# Patient Record
Sex: FEMALE | Race: WHITE | URBAN - METROPOLITAN AREA
[De-identification: names, ages, dates, MRNs, and addresses within clinical notes are randomized per-mention and may not be internally consistent; named-entity substitution may affect disease eponyms.]

---

## 2023-07-02 ENCOUNTER — APPOINTMENT (OUTPATIENT)
Dept: RADIOLOGY | Facility: MEDICAL CENTER | Age: 68
DRG: 065 | End: 2023-07-02
Attending: EMERGENCY MEDICINE
Payer: MEDICARE

## 2023-07-02 ENCOUNTER — APPOINTMENT (OUTPATIENT)
Dept: RADIOLOGY | Facility: MEDICAL CENTER | Age: 68
DRG: 065 | End: 2023-07-02
Attending: HOSPITALIST
Payer: MEDICARE

## 2023-07-02 ENCOUNTER — HOSPITAL ENCOUNTER (INPATIENT)
Facility: MEDICAL CENTER | Age: 68
LOS: 3 days | DRG: 065 | End: 2023-07-05
Attending: EMERGENCY MEDICINE | Admitting: HOSPITALIST
Payer: MEDICARE

## 2023-07-02 DIAGNOSIS — Z72.0 TOBACCO ABUSE: ICD-10-CM

## 2023-07-02 DIAGNOSIS — E11.9 TYPE 2 DIABETES MELLITUS WITHOUT COMPLICATION, WITHOUT LONG-TERM CURRENT USE OF INSULIN (HCC): ICD-10-CM

## 2023-07-02 DIAGNOSIS — I63.9 CEREBROVASCULAR ACCIDENT (CVA), UNSPECIFIED MECHANISM (HCC): ICD-10-CM

## 2023-07-02 DIAGNOSIS — G81.91 HEMIPARESIS OF RIGHT DOMINANT SIDE, UNSPECIFIED HEMIPARESIS ETIOLOGY (HCC): ICD-10-CM

## 2023-07-02 PROBLEM — R29.818 FOCAL NEUROLOGICAL DEFICIT: Status: ACTIVE | Noted: 2023-07-02

## 2023-07-02 LAB
ALBUMIN SERPL BCP-MCNC: 4.2 G/DL (ref 3.2–4.9)
ALBUMIN/GLOB SERPL: 1.4 G/DL
ALP SERPL-CCNC: 84 U/L (ref 30–99)
ALT SERPL-CCNC: 13 U/L (ref 2–50)
ANION GAP SERPL CALC-SCNC: 14 MMOL/L (ref 7–16)
APTT PPP: 30.5 SEC (ref 24.7–36)
AST SERPL-CCNC: 15 U/L (ref 12–45)
BASOPHILS # BLD AUTO: 0.6 % (ref 0–1.8)
BASOPHILS # BLD: 0.06 K/UL (ref 0–0.12)
BILIRUB SERPL-MCNC: 0.5 MG/DL (ref 0.1–1.5)
BUN SERPL-MCNC: 7 MG/DL (ref 8–22)
CALCIUM ALBUM COR SERPL-MCNC: 9.1 MG/DL (ref 8.5–10.5)
CALCIUM SERPL-MCNC: 9.3 MG/DL (ref 8.5–10.5)
CHLORIDE SERPL-SCNC: 101 MMOL/L (ref 96–112)
CO2 SERPL-SCNC: 22 MMOL/L (ref 20–33)
CREAT SERPL-MCNC: 0.42 MG/DL (ref 0.5–1.4)
EKG IMPRESSION: NORMAL
EOSINOPHIL # BLD AUTO: 0.29 K/UL (ref 0–0.51)
EOSINOPHIL NFR BLD: 3.1 % (ref 0–6.9)
ERYTHROCYTE [DISTWIDTH] IN BLOOD BY AUTOMATED COUNT: 39.2 FL (ref 35.9–50)
EST. AVERAGE GLUCOSE BLD GHB EST-MCNC: 315 MG/DL
GFR SERPLBLD CREATININE-BSD FMLA CKD-EPI: 106 ML/MIN/1.73 M 2
GLOBULIN SER CALC-MCNC: 2.9 G/DL (ref 1.9–3.5)
GLUCOSE BLD STRIP.AUTO-MCNC: 246 MG/DL (ref 65–99)
GLUCOSE SERPL-MCNC: 316 MG/DL (ref 65–99)
HBA1C MFR BLD: 12.6 % (ref 4–5.6)
HCT VFR BLD AUTO: 46.6 % (ref 37–47)
HGB BLD-MCNC: 16.2 G/DL (ref 12–16)
IMM GRANULOCYTES # BLD AUTO: 0.04 K/UL (ref 0–0.11)
IMM GRANULOCYTES NFR BLD AUTO: 0.4 % (ref 0–0.9)
INR PPP: 0.99 (ref 0.87–1.13)
LYMPHOCYTES # BLD AUTO: 2 K/UL (ref 1–4.8)
LYMPHOCYTES NFR BLD: 21.1 % (ref 22–41)
MAGNESIUM SERPL-MCNC: 1.7 MG/DL (ref 1.5–2.5)
MCH RBC QN AUTO: 30.4 PG (ref 27–33)
MCHC RBC AUTO-ENTMCNC: 34.8 G/DL (ref 32.2–35.5)
MCV RBC AUTO: 87.4 FL (ref 81.4–97.8)
MONOCYTES # BLD AUTO: 0.71 K/UL (ref 0–0.85)
MONOCYTES NFR BLD AUTO: 7.5 % (ref 0–13.4)
NEUTROPHILS # BLD AUTO: 6.37 K/UL (ref 1.82–7.42)
NEUTROPHILS NFR BLD: 67.3 % (ref 44–72)
NRBC # BLD AUTO: 0 K/UL
NRBC BLD-RTO: 0 /100 WBC (ref 0–0.2)
PLATELET # BLD AUTO: 170 K/UL (ref 164–446)
PMV BLD AUTO: 11.3 FL (ref 9–12.9)
POTASSIUM SERPL-SCNC: 4.1 MMOL/L (ref 3.6–5.5)
PROT SERPL-MCNC: 7.1 G/DL (ref 6–8.2)
PROTHROMBIN TIME: 13 SEC (ref 12–14.6)
RBC # BLD AUTO: 5.33 M/UL (ref 4.2–5.4)
SODIUM SERPL-SCNC: 137 MMOL/L (ref 135–145)
TROPONIN T SERPL-MCNC: 7 NG/L (ref 6–19)
TSH SERPL DL<=0.005 MIU/L-ACNC: 1.69 UIU/ML (ref 0.38–5.33)
WBC # BLD AUTO: 9.5 K/UL (ref 4.8–10.8)

## 2023-07-02 PROCEDURE — 70496 CT ANGIOGRAPHY HEAD: CPT

## 2023-07-02 PROCEDURE — 700105 HCHG RX REV CODE 258: Mod: JZ | Performed by: HOSPITALIST

## 2023-07-02 PROCEDURE — 700102 HCHG RX REV CODE 250 W/ 637 OVERRIDE(OP): Performed by: HOSPITALIST

## 2023-07-02 PROCEDURE — 700117 HCHG RX CONTRAST REV CODE 255: Performed by: HOSPITALIST

## 2023-07-02 PROCEDURE — 82962 GLUCOSE BLOOD TEST: CPT

## 2023-07-02 PROCEDURE — A9270 NON-COVERED ITEM OR SERVICE: HCPCS | Performed by: HOSPITALIST

## 2023-07-02 PROCEDURE — 84484 ASSAY OF TROPONIN QUANT: CPT

## 2023-07-02 PROCEDURE — 85025 COMPLETE CBC W/AUTO DIFF WBC: CPT

## 2023-07-02 PROCEDURE — 80053 COMPREHEN METABOLIC PANEL: CPT

## 2023-07-02 PROCEDURE — 770020 HCHG ROOM/CARE - TELE (206)

## 2023-07-02 PROCEDURE — 700111 HCHG RX REV CODE 636 W/ 250 OVERRIDE (IP): Mod: JZ | Performed by: HOSPITALIST

## 2023-07-02 PROCEDURE — 99223 1ST HOSP IP/OBS HIGH 75: CPT | Mod: 25,AI | Performed by: HOSPITALIST

## 2023-07-02 PROCEDURE — 99285 EMERGENCY DEPT VISIT HI MDM: CPT

## 2023-07-02 PROCEDURE — 70498 CT ANGIOGRAPHY NECK: CPT

## 2023-07-02 PROCEDURE — 36415 COLL VENOUS BLD VENIPUNCTURE: CPT

## 2023-07-02 PROCEDURE — 99406 BEHAV CHNG SMOKING 3-10 MIN: CPT | Performed by: HOSPITALIST

## 2023-07-02 PROCEDURE — 85610 PROTHROMBIN TIME: CPT

## 2023-07-02 PROCEDURE — 83036 HEMOGLOBIN GLYCOSYLATED A1C: CPT

## 2023-07-02 PROCEDURE — 83735 ASSAY OF MAGNESIUM: CPT

## 2023-07-02 PROCEDURE — 84443 ASSAY THYROID STIM HORMONE: CPT

## 2023-07-02 PROCEDURE — 85730 THROMBOPLASTIN TIME PARTIAL: CPT

## 2023-07-02 PROCEDURE — 71045 X-RAY EXAM CHEST 1 VIEW: CPT

## 2023-07-02 PROCEDURE — 94760 N-INVAS EAR/PLS OXIMETRY 1: CPT

## 2023-07-02 PROCEDURE — 99406 BEHAV CHNG SMOKING 3-10 MIN: CPT

## 2023-07-02 PROCEDURE — 70450 CT HEAD/BRAIN W/O DYE: CPT

## 2023-07-02 PROCEDURE — 93005 ELECTROCARDIOGRAM TRACING: CPT | Performed by: EMERGENCY MEDICINE

## 2023-07-02 RX ORDER — INSULIN LISPRO 100 [IU]/ML
2-9 INJECTION, SOLUTION INTRAVENOUS; SUBCUTANEOUS EVERY 6 HOURS
Status: DISCONTINUED | OUTPATIENT
Start: 2023-07-02 | End: 2023-07-03

## 2023-07-02 RX ORDER — ASPIRIN 300 MG/1
300 SUPPOSITORY RECTAL DAILY
Status: DISCONTINUED | OUTPATIENT
Start: 2023-07-02 | End: 2023-07-03

## 2023-07-02 RX ORDER — FEXOFENADINE HCL 180 MG/1
180 TABLET ORAL DAILY
COMMUNITY

## 2023-07-02 RX ORDER — AMOXICILLIN 250 MG
2 CAPSULE ORAL 2 TIMES DAILY
Status: DISCONTINUED | OUTPATIENT
Start: 2023-07-02 | End: 2023-07-05 | Stop reason: HOSPADM

## 2023-07-02 RX ORDER — ATORVASTATIN CALCIUM 40 MG/1
40 TABLET, FILM COATED ORAL EVERY EVENING
Status: DISCONTINUED | OUTPATIENT
Start: 2023-07-02 | End: 2023-07-05 | Stop reason: HOSPADM

## 2023-07-02 RX ORDER — NICOTINE 21 MG/24HR
21 PATCH, TRANSDERMAL 24 HOURS TRANSDERMAL
Status: DISCONTINUED | OUTPATIENT
Start: 2023-07-02 | End: 2023-07-05 | Stop reason: HOSPADM

## 2023-07-02 RX ORDER — ONDANSETRON 4 MG/1
4 TABLET, ORALLY DISINTEGRATING ORAL EVERY 4 HOURS PRN
Status: DISCONTINUED | OUTPATIENT
Start: 2023-07-02 | End: 2023-07-05 | Stop reason: HOSPADM

## 2023-07-02 RX ORDER — LORAZEPAM 2 MG/ML
1 INJECTION INTRAMUSCULAR EVERY 6 HOURS PRN
Status: DISCONTINUED | OUTPATIENT
Start: 2023-07-02 | End: 2023-07-05 | Stop reason: HOSPADM

## 2023-07-02 RX ORDER — ACETAMINOPHEN 325 MG/1
650 TABLET ORAL EVERY 6 HOURS PRN
Status: DISCONTINUED | OUTPATIENT
Start: 2023-07-02 | End: 2023-07-05 | Stop reason: HOSPADM

## 2023-07-02 RX ORDER — OXYCODONE HYDROCHLORIDE 5 MG/1
5 TABLET ORAL EVERY 4 HOURS PRN
Status: DISCONTINUED | OUTPATIENT
Start: 2023-07-02 | End: 2023-07-05 | Stop reason: HOSPADM

## 2023-07-02 RX ORDER — ONDANSETRON 2 MG/ML
4 INJECTION INTRAMUSCULAR; INTRAVENOUS EVERY 4 HOURS PRN
Status: DISCONTINUED | OUTPATIENT
Start: 2023-07-02 | End: 2023-07-05 | Stop reason: HOSPADM

## 2023-07-02 RX ORDER — ASPIRIN 81 MG/1
81 TABLET ORAL DAILY
Status: ON HOLD | COMMUNITY
End: 2023-07-05 | Stop reason: SDUPTHER

## 2023-07-02 RX ORDER — OXYCODONE HYDROCHLORIDE 10 MG/1
10 TABLET ORAL EVERY 4 HOURS PRN
Status: DISCONTINUED | OUTPATIENT
Start: 2023-07-02 | End: 2023-07-05 | Stop reason: HOSPADM

## 2023-07-02 RX ORDER — OXYCODONE HYDROCHLORIDE 5 MG/1
5-10 TABLET ORAL EVERY 4 HOURS PRN
Status: DISCONTINUED | OUTPATIENT
Start: 2023-07-02 | End: 2023-07-02

## 2023-07-02 RX ORDER — ENOXAPARIN SODIUM 100 MG/ML
40 INJECTION SUBCUTANEOUS DAILY
Status: DISCONTINUED | OUTPATIENT
Start: 2023-07-02 | End: 2023-07-05 | Stop reason: HOSPADM

## 2023-07-02 RX ORDER — SODIUM CHLORIDE, SODIUM LACTATE, POTASSIUM CHLORIDE, CALCIUM CHLORIDE 600; 310; 30; 20 MG/100ML; MG/100ML; MG/100ML; MG/100ML
INJECTION, SOLUTION INTRAVENOUS CONTINUOUS
Status: DISCONTINUED | OUTPATIENT
Start: 2023-07-02 | End: 2023-07-03

## 2023-07-02 RX ORDER — POLYETHYLENE GLYCOL 3350 17 G/17G
1 POWDER, FOR SOLUTION ORAL
Status: DISCONTINUED | OUTPATIENT
Start: 2023-07-02 | End: 2023-07-05 | Stop reason: HOSPADM

## 2023-07-02 RX ORDER — BISACODYL 10 MG
10 SUPPOSITORY, RECTAL RECTAL
Status: DISCONTINUED | OUTPATIENT
Start: 2023-07-02 | End: 2023-07-05 | Stop reason: HOSPADM

## 2023-07-02 RX ORDER — ASPIRIN 81 MG/1
81 TABLET, CHEWABLE ORAL DAILY
Status: DISCONTINUED | OUTPATIENT
Start: 2023-07-02 | End: 2023-07-05 | Stop reason: HOSPADM

## 2023-07-02 RX ADMIN — ACETAMINOPHEN 650 MG: 325 TABLET, FILM COATED ORAL at 13:26

## 2023-07-02 RX ADMIN — SODIUM CHLORIDE, POTASSIUM CHLORIDE, SODIUM LACTATE AND CALCIUM CHLORIDE: 600; 310; 30; 20 INJECTION, SOLUTION INTRAVENOUS at 13:23

## 2023-07-02 RX ADMIN — SENNOSIDES AND DOCUSATE SODIUM 2 TABLET: 50; 8.6 TABLET ORAL at 18:22

## 2023-07-02 RX ADMIN — ATORVASTATIN CALCIUM 40 MG: 40 TABLET, FILM COATED ORAL at 18:22

## 2023-07-02 RX ADMIN — OXYCODONE HYDROCHLORIDE 10 MG: 10 TABLET ORAL at 16:36

## 2023-07-02 RX ADMIN — ENOXAPARIN SODIUM 40 MG: 100 INJECTION SUBCUTANEOUS at 18:23

## 2023-07-02 RX ADMIN — IOHEXOL 80 ML: 350 INJECTION, SOLUTION INTRAVENOUS at 13:23

## 2023-07-02 RX ADMIN — ASPIRIN 81 MG 81 MG: 81 TABLET ORAL at 11:57

## 2023-07-02 RX ADMIN — INSULIN LISPRO 3 UNITS: 100 INJECTION, SOLUTION INTRAVENOUS; SUBCUTANEOUS at 16:37

## 2023-07-02 RX ADMIN — INSULIN GLARGINE-YFGN 15 UNITS: 100 INJECTION, SOLUTION SUBCUTANEOUS at 18:23

## 2023-07-02 ASSESSMENT — COPD QUESTIONNAIRES
COPD SCREENING SCORE: 5
HAVE YOU SMOKED AT LEAST 100 CIGARETTES IN YOUR ENTIRE LIFE: YES
DO YOU EVER COUGH UP ANY MUCUS OR PHLEGM?: YES, A FEW DAYS A WEEK OR MONTH
DURING THE PAST 4 WEEKS HOW MUCH DID YOU FEEL SHORT OF BREATH: NONE/LITTLE OF THE TIME

## 2023-07-02 ASSESSMENT — COGNITIVE AND FUNCTIONAL STATUS - GENERAL
MOBILITY SCORE: 16
WALKING IN HOSPITAL ROOM: A LOT
HELP NEEDED FOR BATHING: A LOT
CLIMB 3 TO 5 STEPS WITH RAILING: A LOT
PERSONAL GROOMING: A LOT
EATING MEALS: A LOT
MOVING FROM LYING ON BACK TO SITTING ON SIDE OF FLAT BED: A LITTLE
DRESSING REGULAR UPPER BODY CLOTHING: A LOT
DAILY ACTIVITIY SCORE: 12
DRESSING REGULAR LOWER BODY CLOTHING: A LOT
TOILETING: A LOT
STANDING UP FROM CHAIR USING ARMS: A LOT
SUGGESTED CMS G CODE MODIFIER DAILY ACTIVITY: CL
MOVING TO AND FROM BED TO CHAIR: A LITTLE
SUGGESTED CMS G CODE MODIFIER MOBILITY: CK

## 2023-07-02 ASSESSMENT — ENCOUNTER SYMPTOMS
DOUBLE VISION: 0
DIZZINESS: 0
VOMITING: 0
SORE THROAT: 0
COUGH: 0
FOCAL WEAKNESS: 1
ABDOMINAL PAIN: 0
SPEECH CHANGE: 1
HEADACHES: 1
BLURRED VISION: 0
SHORTNESS OF BREATH: 0
CHILLS: 0
FEVER: 0
PALPITATIONS: 0
DIARRHEA: 0
BACK PAIN: 0
LOSS OF CONSCIOUSNESS: 0
NAUSEA: 0

## 2023-07-02 NOTE — ED TRIAGE NOTES
Chief Complaint   Patient presents with    Slurred Speech     Since Friday @ 1000    Unilateral Weakness     Right sided weakness. Right arm strength 1/5 compared to left arm 5/5, also starting @ 1000 on friday       Pt BIBA from home with the above complaint. GCS 15, FSBG 253. Pt states she takes daily asa and is supposed to take metformin but has not in several weeks.     BP (!) 161/86   Pulse 94   Temp 36.3 °C (97.4 °F) (Temporal)   Resp 18   Ht 1.524 m (5')   Wt 74.8 kg (165 lb)   SpO2 89%   BMI 32.22 kg/m²

## 2023-07-02 NOTE — ED PROVIDER NOTES
ER Provider Note    Scribed for Narendra Corrigan M.D. by Shaila Fuentes. 7/2/2023  7:31 AM    Primary Care Provider: No primary care provider noted.    CHIEF COMPLAINT  Chief Complaint   Patient presents with    Slurred Speech     Since Friday @ 1000    Unilateral Weakness     Right sided weakness. Right arm strength 1/5 compared to left arm 5/5, also starting @ 1000 on friday       HPI/ROS  LIMITATION TO HISTORY   None  OUTSIDE HISTORIAN(S):  Sister is at bedside.     Nyasia Schuster is a 67 y.o. female who presents to the ED complaining of slurred speech onset 2 days ago. Patient states associated symptoms of unilateral weakness and numbness, that is worse in her right upper extremity that comes. She notes she takes baby aspirin. She denies a history of strokes. She admits to smoking 2 packs per day. Narendra Corrigan M.D. spent greater than 3 minutes with the patient explaining the importance of smoking cessation.     PAST MEDICAL HISTORY  History reviewed. No pertinent past medical history.    SURGICAL HISTORY  History reviewed. No pertinent surgical history.    FAMILY HISTORY  History reviewed. No pertinent family history.    SOCIAL HISTORY   reports that she has been smoking cigarettes. She has been smoking an average of 2 packs per day. She has never used smokeless tobacco. She reports that she does not currently use alcohol. She reports that she does not currently use drugs.    CURRENT MEDICATIONS  None noted.     ALLERGIES  Patient has no known allergies.    PHYSICAL EXAM  BP (!) 161/86   Pulse 94   Temp 36.3 °C (97.4 °F) (Temporal)   Resp 18   Ht 1.524 m (5')   Wt 74.8 kg (165 lb)   SpO2 89%   BMI 32.22 kg/m²     Constitutional: Well developed, Well nourished, No acute distress, Non-toxic appearance.   HENT: Normocephalic, Atraumatic, Bilateral external ears normal, Oropharynx is clear mucous membranes are moist. No oral exudates or nasal discharge.   Eyes: Pupils are equal round and reactive, EOMI,  Conjunctiva normal, No discharge.   Neck: Normal range of motion, No tenderness, Supple, No stridor. No meningismus.  Lymphatic: No lymphadenopathy noted.   Cardiovascular: Regular rate and rhythm without murmur rub or gallop.  Thorax & Lungs: Clear breath sounds bilaterally without wheezes, rhonchi or rales. There is no chest wall tenderness.   Abdomen: Soft non-tender non-distended. There is no rebound or guarding. No organomegaly is appreciated. Bowel sounds are normal.  Skin: Normal without rash.   Back: No CVA or spinal tenderness.   Extremities: Intact distal pulses, No edema, No tenderness, No cyanosis, No clubbing. Capillary refill is less than 2 seconds.  Musculoskeletal: Good range of motion in all major joints. No tenderness to palpation or major deformities noted.   Neurologic: Alert & oriented x 3, abnormal motor function, right upper extremity 3 out of 5 weakness, 5 out of 5 right lower extremity motor function. Reflexes are normal. Diminished sensation on right side of nose and cheek.  Gait not tested at this time  Psychiatric: Affect normal, Judgment normal, Mood normal. There is no suicidal ideation or patient reported hallucinations.       DIAGNOSTIC STUDIES    Labs:   Labs Reviewed   CBC WITH DIFFERENTIAL - Abnormal; Notable for the following components:       Result Value    Hemoglobin 16.2 (*)     Lymphocytes 21.10 (*)     All other components within normal limits    Narrative:     Indicate which anticoagulants the patient is on:->NONE  Biotin intake of greater than 5 mg per day may interfere with  troponin levels, causing false low values.   COMP METABOLIC PANEL - Abnormal; Notable for the following components:    Glucose 316 (*)     Bun 7 (*)     Creatinine 0.42 (*)     All other components within normal limits    Narrative:     Indicate which anticoagulants the patient is on:->NONE  Biotin intake of greater than 5 mg per day may interfere with  troponin levels, causing false low values.    APTT    Narrative:     Indicate which anticoagulants the patient is on:->NONE  Biotin intake of greater than 5 mg per day may interfere with  troponin levels, causing false low values.   PROTHROMBIN TIME    Narrative:     Indicate which anticoagulants the patient is on:->NONE  Biotin intake of greater than 5 mg per day may interfere with  troponin levels, causing false low values.   TROPONIN    Narrative:     Indicate which anticoagulants the patient is on:->NONE  Biotin intake of greater than 5 mg per day may interfere with  troponin levels, causing false low values.   CORRECTED CALCIUM    Narrative:     Indicate which anticoagulants the patient is on:->NONE  Biotin intake of greater than 5 mg per day may interfere with  troponin levels, causing false low values.   ESTIMATED GFR    Narrative:     Indicate which anticoagulants the patient is on:->NONE  Biotin intake of greater than 5 mg per day may interfere with  troponin levels, causing false low values.       EKG:   I have independently interpreted this EKG  Results for orders placed or performed during the hospital encounter of 23   EKG   Result Value Ref Range    Report       [facility] Emergency Dept.    Test Date:  2023  Pt Name:    LUIS AG          Department: ER  MRN:        [id number]                      Room:       [facility]  Gender:     Female                       Technician: [id number]  :        [id number]                   Requested By:ER TRIAGE PROTOCOL  Order #:    [id number]                    Reading MD: MURPHY BARFIELD MD    Measurements  Intervals                                Axis  Rate:       91                           P:          70  SD:         156                          QRS:        80  QRSD:       73                           T:          44  QT:         356  QTc:        439    Interpretive Statements  Sinus rhythm  Probable left atrial enlargement  Low voltage, extremity leads  Anteroseptal infarct,  old  No previous ECG available for comparison  Electronically Signed On 07- 07:38:03 PDT by MURPHY BARFIELD MD           Radiology:   The attending emergency physician has independently interpreted the diagnostic imaging associated with this visit and am waiting the final reading from the radiologist.   My preliminary interpretation is a follows: No intercranial bleed    Radiologist interpretation:   CT-HEAD W/O   Final Result      1.  Chronic microvascular ischemic type changes.   2.  No acute intracranial abnormality.         DX-CHEST-PORTABLE (1 VIEW)   Final Result         1.  No acute cardiopulmonary disease.   2.  Atherosclerosis           COURSE & MEDICAL DECISION MAKING     ED Observation Status? Yes; I am placing the patient in to an observation status due to a diagnostic uncertainty as well as therapeutic intensity. Patient placed in observation status at 7:37 AM, 7/2/2023.     Observation plan is as follows: Labs and imaging    Upon Reevaluation, the patient's condition has: not improved; and will be escalated to hospitalization.    Patient discharged from ED Observation status at 9:48 AM (Time) (7/2/2023) (Date).     INITIAL ASSESSMENT, COURSE AND PLAN  Care Narrative:   7:31 AM - Patient seen and examined at bedside. Discussed plan of care, including labs and imaging. Patient agrees to the plan of care. Ordered for labs, DX-Chest, and CT-Head w/o to evaluate her symptoms.      Laboratory evaluation reveals no leukocytosis, shift, anemia, electrolyte derangements or acidosis and glucose is elevated at 316.    CT scan of the head shows no evidence of intracranial bleed.  Chest x-ray is also unremarkable.    9:49 AM - I reevaluated the patient at bedside. The patient informs me they feel improved following administration. I discussed the patient's diagnostic study results. I informed the patient of my plan to admit today given the patient's current presentation and diagnostic study results. Patient  verbalizes understanding and support with my plan for admission.      Patient does not meet criteria for alteplase therapy.  This is a completed stroke.  She does not qualify for IR as well.  She will need to be admitted for further work-up including MRI of the brain, carotid duplex, possible echo    DISPOSITION AND DISCUSSIONS  I have discussed management of the patient with the following physicians and FELICIANO's:  9:49 AM - Spoke with Dr. Olivo (Hospitalist) who agrees to admit the patient.       Barriers to care at this time, including but not limited to: Patient does not have established PCP.     Decision tools and prescription drugs considered including, but not limited to: NIH Stroke Scale is 3 .    DISPOSITION:  Patient will be hospitalized by Dr. Olivo in guarded condition.    FINAL DIANGOSIS  1. Cerebrovascular accident (CVA), unspecified mechanism (HCC)    2. Tobacco abuse       Shaila GREEN ([place]), am scribing for, and in the presence of, Narendra Corrigan M.D..    Electronically signed by: Shaila Fuentes ([place]), 7/2/2023    Narendra GREEN M.D. personally performed the services described in this documentation, as scribed by Shaila Fuentes in my presence, and it is both accurate and complete.     The note accurately reflects work and decisions made by me.  Narendra Corrigan M.D.  7/2/2023  10:32 AM

## 2023-07-02 NOTE — ASSESSMENT & PLAN NOTE
Patient had been prescribed metformin several years ago but lost her insurance and has not been on any medication since.  continue sliding scale  A1c >12  Speech eval pending  Can likely resume metformin once passes swallow eval   Start more definitive management once we know what her A1c is and her p.o. intake has been resumed

## 2023-07-02 NOTE — ASSESSMENT & PLAN NOTE
CT scan is negative  Differential includes ischemic event versus mass lesion versus demyelinating process  Continue tele  Continue serial  neuro exams  Continue statin  Echo noted  Neurology consulted  -Aspirin for 10 days, stop date 7/13  -Plavix indefinitely  Mri done pending official read  PT/OT-postacute placement  PM&R-good candidate for IPR

## 2023-07-02 NOTE — PROGRESS NOTES
4 Eyes Skin Assessment Completed by SILVANA Sampson and SILVANA Hansen.    Head WDL  Ears WDL  Nose WDL  Mouth WDL  Neck WDL  Breast/Chest WDL  Shoulder Blades WDL  Spine WDL  (R) Arm/Elbow/Hand WDL  (L) Arm/Elbow/Hand WDL  Abdomen WDL  Groin WDL  Scrotum/Coccyx/Buttocks Redness and Blanching  (R) Leg WDL  (L) Leg WDL  (R) Heel/Foot/Toe WDL  (L) Heel/Foot/Toe WDL          Devices In Places Pulse Ox      Interventions In Place Gray Ear Foams    Possible Skin Injury No    Pictures Uploaded Into Epic N/A  Wound Consult Placed N/A  RN Wound Prevention Protocol Ordered No

## 2023-07-02 NOTE — ED NOTES
Med Rec complete per patient  No known allergies  Preferred Pharmacy: [facility]   Patient denies any prescription medications in the last 30 days

## 2023-07-02 NOTE — H&P
"Hospital Medicine History & Physical Note    Date of Service  7/2/2023    Primary Care Physician  No primary care provider on file.    Consultants      Specialist Names:     Code Status  No Order    Chief Complaint  Chief Complaint   Patient presents with    Slurred Speech     Since Friday @ 1000    Unilateral Weakness     Right sided weakness. Right arm strength 1/5 compared to left arm 5/5, also starting @ 1000 on friday       History of Presenting Illness  Nyasia Schuster is a 67 y.o. female who presented 7/2/2023 with dysarthria and right arm weakness.    Patient has a previous medical history of tobacco use and diabetes.  She moved up to the [facility] to help care for her mom several years ago, and lost her insurance and has been off of metformin ever since.  She is currently on no prescribed medications and has not seen a physician in several years due to her insurance situation.    She reports that 2 days ago, she noticed some difficulty with her speech.  It seemed like it was difficult for her to form words.  Around the same time she noticed that her right arm was weak.  Patient is right-hand-dominant.  Her sister who is at bedside and lives next to the patient noticed the speech difficulty 2 days ago, but did not notice any arm weakness until today.  She did not however see her sister yesterday.    Patient reports a little numbness on the right side of her face, and her right arm feels like it is asleep.  She is able to walk, but she states that she ends up walking in circles.  She is unable to tell me if that is because of weakness or dizziness.  This is however not normal for her.  Sister notes that 2 days ago it sounds like \"she was drunk\".  Patient was saying the right words, but they were just slurred.  Her speech is actually much better today.  Otherwise patient notes a headache, which she attributes to allergies.  She does get these headaches often and does not feel this headache is unusual.  She " has not had any chest pain or unusual back neck jaw or shoulder pain.  She has not had any palpitations.    I discussed the plan of care with patient and family.    Review of Systems  Review of Systems   Constitutional:  Negative for chills and fever.   HENT:  Negative for nosebleeds and sore throat.    Eyes:  Negative for blurred vision and double vision.   Respiratory:  Negative for cough and shortness of breath.    Cardiovascular:  Negative for chest pain, palpitations and leg swelling.   Gastrointestinal:  Negative for abdominal pain, diarrhea, nausea and vomiting.   Genitourinary:  Negative for dysuria and urgency.   Musculoskeletal:  Negative for back pain.   Skin:  Negative for rash.   Neurological:  Positive for speech change, focal weakness and headaches. Negative for dizziness and loss of consciousness.       Past Medical History   has no past medical history on file.    Surgical History   has no past surgical history on file.     Family History  family history is not on file.   Family history reviewed with patient. There is no family history that is pertinent to the chief complaint.     Social History   reports that she has been smoking cigarettes. She has been smoking an average of 2 packs per day. She has never used smokeless tobacco. She reports that she does not currently use alcohol. She reports that she does not currently use drugs.    Allergies  No Known Allergies    Medications  None       Physical Exam  Temp:  [36.3 °C (97.4 °F)] 36.3 °C (97.4 °F)  Pulse:  [91-98] 91  Resp:  [16-18] 17  BP: (161-175)/(79-88) 170/88  SpO2:  [89 %-93 %] 91 %  Blood Pressure : (!) 170/88   Temperature: 36.3 °C (97.4 °F)   Pulse: 91   Respiration: 17   Pulse Oximetry: 91 %       Physical Exam  Constitutional:       General: She is not in acute distress.     Appearance: Normal appearance. She is well-developed. She is not diaphoretic.   HENT:      Head: Normocephalic and atraumatic.   Neck:      Vascular: No JVD.    Cardiovascular:      Rate and Rhythm: Normal rate and regular rhythm.      Heart sounds: No murmur heard.  Pulmonary:      Effort: Pulmonary effort is normal. No respiratory distress.      Breath sounds: No stridor. No wheezing or rales.   Abdominal:      Palpations: Abdomen is soft.      Tenderness: There is no abdominal tenderness. There is no guarding or rebound.   Musculoskeletal:         General: No tenderness.      Right lower leg: No edema.      Left lower leg: No edema.   Skin:     General: Skin is warm and dry.      Findings: No rash.   Neurological:      Mental Status: She is alert and oriented to person, place, and time.      Comments: Patient is alert oriented, demonstrates no difficulties with receptive deficits.  Speech is slurred however word selection is appropriate.  External ocular muscles are intact pupils are equal and reactive  Patient smile slightly asymmetric however sister notes that that is normal for her.  Tongue is midline palate elevates symmetrically  Strength in the left arm leg and right leg are all 5/5  Right arm is 2/5: Patient is able to elevate her lower arm off the bed however cannot lift her arm off the bed in its entirety.  Gait was not tested   Psychiatric:         Mood and Affect: Mood normal.         Behavior: Behavior normal.         Thought Content: Thought content normal.         Laboratory:  Recent Labs     07/02/23  0730   WBC 9.5   RBC 5.33   HEMOGLOBIN 16.2*   HEMATOCRIT 46.6   MCV 87.4   MCH 30.4   MCHC 34.8   RDW 39.2   PLATELETCT 170   MPV 11.3     Recent Labs     07/02/23  0730   SODIUM 137   POTASSIUM 4.1   CHLORIDE 101   CO2 22   GLUCOSE 316*   BUN 7*   CREATININE 0.42*   CALCIUM 9.3     Recent Labs     07/02/23  0730   ALTSGPT 13   ASTSGOT 15   ALKPHOSPHAT 84   TBILIRUBIN 0.5   GLUCOSE 316*     Recent Labs     07/02/23  0730   APTT 30.5   INR 0.99     No results for input(s): NTPROBNP in the last 72 hours.      Recent Labs     07/02/23  0730   TROPONINT 7        Imaging:  CT-HEAD W/O   Final Result      1.  Chronic microvascular ischemic type changes.   2.  No acute intracranial abnormality.         DX-CHEST-PORTABLE (1 VIEW)   Final Result         1.  No acute cardiopulmonary disease.   2.  Atherosclerosis          X-Ray:  I have personally reviewed the images and compared with prior images. and My impression is: Head CT is reviewed and agree with radiologist that there are no acute findings.  Chest x-ray is likewise benign.  EKG:  I have personally reviewed the images and compared with prior images. and My impression is: EKG reviewed by myself demonstrates normal sinus rhythm with low voltage.  No evidence of acute ischemia or conduction delays.    Assessment/Plan:  Justification for Admission Status  I anticipate this patient will require at least two midnights for appropriate medical management, necessitating inpatient admission because focal neurologic deficits    Patient will need a Med/Surg bed on EMERGENCY service .  The need is secondary to focal neurologic deficit.    Hemiparesis of right dominant side (HCC)  Assessment & Plan  CT scan is negative  Differential includes ischemic event versus mass lesion versus demyelinating process  Admit to neurology  Serial neuro exams  Start aspirin  Start statin  PT OT and speech consultations  Keep n.p.o. pending speech eval  Consult neuro  Lifestyle modification with smoking cessation  Appropriate management of diabetes  Monitor blood pressures  Initial EKG and telemetry is sinus  Admit to monitored bed  Cardiac echo  Complete stroke imaging series with CTA of the head and neck as well as MRI as noted    Given that the time of onset is greater than 36 hours, and her NIHSS score is low, I do not think she is a candidate for thrombolytics or mechanical thrombectomy.    Type 2 diabetes mellitus without complication, without long-term current use of insulin (HCC)  Assessment & Plan  Patient had been prescribed metformin  several years ago but lost her insurance and has not been on any medication since.  Started on sliding scale  Check A1c  Currently n.p.o. pending speech eval  Start more definitive management once we know what her A1c is and her p.o. intake has been resumed    Tobacco abuse  Assessment & Plan  Patient counseled x5 minutes.  As needed replacement while in house.        VTE prophylaxis: enoxaparin ppx

## 2023-07-03 ENCOUNTER — APPOINTMENT (OUTPATIENT)
Dept: CARDIOLOGY | Facility: MEDICAL CENTER | Age: 68
DRG: 065 | End: 2023-07-03
Attending: HOSPITALIST
Payer: MEDICARE

## 2023-07-03 LAB
ANION GAP SERPL CALC-SCNC: 9 MMOL/L (ref 7–16)
BUN SERPL-MCNC: 5 MG/DL (ref 8–22)
CALCIUM SERPL-MCNC: 9.1 MG/DL (ref 8.5–10.5)
CHLORIDE SERPL-SCNC: 103 MMOL/L (ref 96–112)
CHOLEST SERPL-MCNC: 139 MG/DL (ref 100–199)
CO2 SERPL-SCNC: 27 MMOL/L (ref 20–33)
CREAT SERPL-MCNC: 0.46 MG/DL (ref 0.5–1.4)
GFR SERPLBLD CREATININE-BSD FMLA CKD-EPI: 104 ML/MIN/1.73 M 2
GLUCOSE BLD STRIP.AUTO-MCNC: 125 MG/DL (ref 65–99)
GLUCOSE BLD STRIP.AUTO-MCNC: 129 MG/DL (ref 65–99)
GLUCOSE BLD STRIP.AUTO-MCNC: 149 MG/DL (ref 65–99)
GLUCOSE BLD STRIP.AUTO-MCNC: 173 MG/DL (ref 65–99)
GLUCOSE BLD STRIP.AUTO-MCNC: 181 MG/DL (ref 65–99)
GLUCOSE SERPL-MCNC: 161 MG/DL (ref 65–99)
HDLC SERPL-MCNC: 32 MG/DL
LDLC SERPL CALC-MCNC: 90 MG/DL
LV EJECT FRACT  99904: 70
LV EJECT FRACT MOD 2C 99903: 62.28
LV EJECT FRACT MOD 4C 99902: 66.24
LV EJECT FRACT MOD BP 99901: 59.03
POTASSIUM SERPL-SCNC: 4 MMOL/L (ref 3.6–5.5)
SODIUM SERPL-SCNC: 139 MMOL/L (ref 135–145)
TRIGL SERPL-MCNC: 87 MG/DL (ref 0–149)

## 2023-07-03 PROCEDURE — 770020 HCHG ROOM/CARE - TELE (206)

## 2023-07-03 PROCEDURE — 99233 SBSQ HOSP IP/OBS HIGH 50: CPT | Performed by: HOSPITALIST

## 2023-07-03 PROCEDURE — 700102 HCHG RX REV CODE 250 W/ 637 OVERRIDE(OP): Performed by: HOSPITALIST

## 2023-07-03 PROCEDURE — A9270 NON-COVERED ITEM OR SERVICE: HCPCS | Performed by: HOSPITALIST

## 2023-07-03 PROCEDURE — 92610 EVALUATE SWALLOWING FUNCTION: CPT

## 2023-07-03 PROCEDURE — 700111 HCHG RX REV CODE 636 W/ 250 OVERRIDE (IP): Mod: JZ | Performed by: HOSPITALIST

## 2023-07-03 PROCEDURE — 93306 TTE W/DOPPLER COMPLETE: CPT

## 2023-07-03 PROCEDURE — 80061 LIPID PANEL: CPT

## 2023-07-03 PROCEDURE — 99223 1ST HOSP IP/OBS HIGH 75: CPT | Performed by: PSYCHIATRY & NEUROLOGY

## 2023-07-03 PROCEDURE — 82962 GLUCOSE BLOOD TEST: CPT

## 2023-07-03 PROCEDURE — 36415 COLL VENOUS BLD VENIPUNCTURE: CPT

## 2023-07-03 PROCEDURE — 700102 HCHG RX REV CODE 250 W/ 637 OVERRIDE(OP): Performed by: PSYCHIATRY & NEUROLOGY

## 2023-07-03 PROCEDURE — 700105 HCHG RX REV CODE 258: Mod: JZ | Performed by: HOSPITALIST

## 2023-07-03 PROCEDURE — 97163 PT EVAL HIGH COMPLEX 45 MIN: CPT

## 2023-07-03 PROCEDURE — 93306 TTE W/DOPPLER COMPLETE: CPT | Mod: 26 | Performed by: INTERNAL MEDICINE

## 2023-07-03 PROCEDURE — 80048 BASIC METABOLIC PNL TOTAL CA: CPT

## 2023-07-03 PROCEDURE — A9270 NON-COVERED ITEM OR SERVICE: HCPCS | Performed by: PSYCHIATRY & NEUROLOGY

## 2023-07-03 RX ORDER — LIDOCAINE 50 MG/G
2 PATCH TOPICAL
Status: DISCONTINUED | OUTPATIENT
Start: 2023-07-03 | End: 2023-07-05 | Stop reason: HOSPADM

## 2023-07-03 RX ORDER — INSULIN LISPRO 100 [IU]/ML
2-9 INJECTION, SOLUTION INTRAVENOUS; SUBCUTANEOUS
Status: DISCONTINUED | OUTPATIENT
Start: 2023-07-03 | End: 2023-07-05 | Stop reason: HOSPADM

## 2023-07-03 RX ORDER — CLOPIDOGREL BISULFATE 75 MG/1
75 TABLET ORAL DAILY
Status: DISCONTINUED | OUTPATIENT
Start: 2023-07-03 | End: 2023-07-05 | Stop reason: HOSPADM

## 2023-07-03 RX ADMIN — OXYCODONE HYDROCHLORIDE 10 MG: 10 TABLET ORAL at 00:20

## 2023-07-03 RX ADMIN — ASPIRIN 81 MG 81 MG: 81 TABLET ORAL at 05:31

## 2023-07-03 RX ADMIN — ATORVASTATIN CALCIUM 40 MG: 40 TABLET, FILM COATED ORAL at 17:27

## 2023-07-03 RX ADMIN — INSULIN LISPRO 2 UNITS: 100 INJECTION, SOLUTION INTRAVENOUS; SUBCUTANEOUS at 00:25

## 2023-07-03 RX ADMIN — SODIUM CHLORIDE, POTASSIUM CHLORIDE, SODIUM LACTATE AND CALCIUM CHLORIDE: 600; 310; 30; 20 INJECTION, SOLUTION INTRAVENOUS at 00:47

## 2023-07-03 RX ADMIN — INSULIN LISPRO 2 UNITS: 100 INJECTION, SOLUTION INTRAVENOUS; SUBCUTANEOUS at 17:18

## 2023-07-03 RX ADMIN — CLOPIDOGREL BISULFATE 75 MG: 75 TABLET ORAL at 17:05

## 2023-07-03 RX ADMIN — NICOTINE TRANSDERMAL SYSTEM 21 MG: 21 PATCH, EXTENDED RELEASE TRANSDERMAL at 06:00

## 2023-07-03 RX ADMIN — ENOXAPARIN SODIUM 40 MG: 100 INJECTION SUBCUTANEOUS at 17:26

## 2023-07-03 ASSESSMENT — PAIN DESCRIPTION - PAIN TYPE
TYPE: ACUTE PAIN
TYPE: ACUTE PAIN

## 2023-07-03 ASSESSMENT — COGNITIVE AND FUNCTIONAL STATUS - GENERAL
STANDING UP FROM CHAIR USING ARMS: A LITTLE
MOVING FROM LYING ON BACK TO SITTING ON SIDE OF FLAT BED: UNABLE
MOBILITY SCORE: 13
MOVING TO AND FROM BED TO CHAIR: A LOT
SUGGESTED CMS G CODE MODIFIER MOBILITY: CL
WALKING IN HOSPITAL ROOM: A LOT
CLIMB 3 TO 5 STEPS WITH RAILING: TOTAL

## 2023-07-03 ASSESSMENT — ENCOUNTER SYMPTOMS
COUGH: 0
RESPIRATORY NEGATIVE: 1
HEADACHES: 0
MUSCULOSKELETAL NEGATIVE: 1
ABDOMINAL PAIN: 0
DIAPHORESIS: 0
SENSORY CHANGE: 1
FEVER: 0
MYALGIAS: 0
SORE THROAT: 0
BLURRED VISION: 0
SHORTNESS OF BREATH: 0
BRUISES/BLEEDS EASILY: 0
PSYCHIATRIC NEGATIVE: 1
CHILLS: 0
EYES NEGATIVE: 1
VOMITING: 0
CONSTITUTIONAL NEGATIVE: 1
FOCAL WEAKNESS: 1
DEPRESSION: 0
WEIGHT LOSS: 0
CARDIOVASCULAR NEGATIVE: 1
NAUSEA: 0
WEAKNESS: 0
DIZZINESS: 0
GASTROINTESTINAL NEGATIVE: 1
PALPITATIONS: 0

## 2023-07-03 ASSESSMENT — LIFESTYLE VARIABLES: SUBSTANCE_ABUSE: 0

## 2023-07-03 ASSESSMENT — GAIT ASSESSMENTS
ASSISTIVE DEVICE: FRONT WHEEL WALKER
GAIT LEVEL OF ASSIST: MODERATE ASSIST
DEVIATION: ATAXIC;STEP TO;DECREASED BASE OF SUPPORT;SHUFFLED GAIT;DECREASED HEEL STRIKE;DECREASED TOE OFF
DISTANCE (FEET): 3

## 2023-07-03 NOTE — DISCHARGE PLANNING
Case Management Discharge Planning    Admission Date: 7/2/2023  GMLOS: 2.9  ALOS: 1    6-Clicks ADL Score: 12  6-Clicks Mobility Score: 13  PT and/or OT Eval ordered: Yes  Post-acute Referrals Ordered: No  Post-acute Choice Obtained: No  Has referral(s) been sent to post-acute provider:  No      Anticipated Discharge Dispo: Discharge Disposition: D/T to SNF with Medicare cert in anticipation of skilled care (03)    DME Needed: No    Action(s) Taken: DC Assessment Complete (See below)    LSW met with pt at bedside to complete DC assessment. Pt A&Ox4 and able to verify the information on the face sheet. Pt lives with her son and sister in a single-story house that has five step to enter. Prior to this hospitalization pt was independent at home with ADLs and most IADLs.  Pt does not use any DME at baseline. Pt reported that her son and sister are a good support for her. Pt denies any SA or MH concerns. Pt does not have an advance directive. Pt family will transport pt home.     Pt states that she has Medicare for insurance. LSW messaged PFA to screen pt for insurance. Pt states her SSN is . Pt states she does not have a PCP.     Escalations Completed: None    Medically Clear: No    Next Steps:  f/u with pt and medical team to discuss dc needs and barriers.     Barriers to Discharge: Medical clearance and Pending Placement      Care Transition Team Assessment    Information Source  Orientation Level: Oriented X4  Information Given By: Patient  Who is responsible for making decisions for patient? : Patient    Readmission Evaluation  Is this a readmission?: No    Elopement Risk  Legal Hold: No  Ambulatory or Self Mobile in Wheelchair: No-Not an Elopement Risk  Elopement Risk: Not at Risk for Elopement    Interdisciplinary Discharge Planning  Lives with - Patient's Self Care Capacity: Adult Children  Patient or legal guardian wants to designate a caregiver: No  Housing / Facility: 2 Story House (states she can  stay on first floor if needed, normaly stays on second)  Prior Services: None    Discharge Preparedness  What is your plan after discharge?: Skilled nursing facility  What are your discharge supports?: Child, Sibling  Prior Functional Level: Ambulatory, Drives Self, Independent with Activities of Daily Living, Independent with Medication Management    Functional Assesment  Prior Functional Level: Ambulatory, Drives Self, Independent with Activities of Daily Living, Independent with Medication Management                   Advance Directive  Advance Directive?: None    Domestic Abuse  Have you ever been the victim of abuse or violence?: No    Psychological Assessment  History of Substance Abuse: None  History of Psychiatric Problems: No  Non-compliant with Treatment: No         Anticipated Discharge Information  Discharge Disposition: D/T to SNF with Medicare cert in anticipation of skilled care (03)

## 2023-07-03 NOTE — THERAPY
Speech Language Pathology   Clinical Swallow Evaluation     Patient Name: Nyasia Schuster  AGE:  67 y.o., SEX:  female  Medical Record #: [id number]  Date of Service: 7/3/2023      History of Present Illness  67 y.o. female who presents to the ED complaining of slurred speech onset 2 days ago. Patient states associated symptoms of unilateral weakness and numbness, that is worse in her right upper extremity that comes. She notes she takes baby aspirin. She denies a history of strokes. She admits to smoking 2 packs per day.  No MRI at time of eval.      Head CT:  1.  Chronic microvascular ischemic type changes.  2.  No acute intracranial abnormality.        General Information:  Vitals  O2 (LPM): 3  O2 Delivery Device: Nasal Cannula        Prior Living Situation & Level of Function:  Prior Services: None  Housing / Facility: [address] [place] [address] House  Lives with - Patient's Self Care Capacity: Adult Children  Comments: pt states she lives with her son who can provide intermittent assist only. Also has sister that lives in same neighborhood  Communication: WNL  Swallowing: WNL       Oral Mechanism Evaluation:  Dentition: Missing posterior dentition, Poor   Facial Symmetry: Central right facial droop  Facial Sensation: Impaired - right     Labial Observations: Right sided weakness   Lingual Observations: Left lingual deviation  Motor Speech: mild dysarthria          Laryngeal Function:  Secretion Management: Adequate  Voice Quality: WFL  Cough: Perceptually WNL       Assessment  Current Method of Nutrition: Oral diet (Regular/thins per MD)  Positioning: Sitting [facility]  Bolus Administration: Patient  O2 (LPM): 3 O2 Delivery Device: Nasal Cannula  Factor(s) Affecting Performance: None     Swallowing Trials:  Swallowing Trials  Thin Liquid (TN0): WFL  Pureed (PU4): WFL  Soft & Bite Sized (SB6): WFL  Regular (RG7): Impaired    Comments: Pt AAOx4, sitting up in chair with right sided facial droop and mild dysarthria noted.  No S/Sx of  aspiration were noted with any consistency tested, however pt had prolonged and ineffective mastication with hard solids, and mild anterior spillage on right side.  Swallow initiation was timely with all consistencies and voice remained unchanged throughout session.    Comments/Clinical Impressions  Pt is presenting with mild oral phase dysphagia, with mild difficulty noted with mastication and mild anterior spillage noted on the right side.  She appears to be at the level to start a modified diet of soft and bite sized solids with thin liquids.        Recommendations  Diet Consistency: Soft and bite sized with thins  Instrumentation: None indicated at this time  Medication: Whole with puree  Supervision: Assist with meal tray set up  Positioning: Fully upright and midline during oral intake  Risk Management : Small bites/sips, Slow rate of intake, Monitor for right pocketing, No straws  Oral Care: BID       SLP Treatment Plan  Treatment Plan: Dysphagia Treatment, Patient/Family/Caregiver Training  SLP Frequency: 5x Per Week  Estimated Duration: Until Therapy Goals Met    Anticipated Discharge Needs  Discharge Recommendations: Recommend post-acute placement for additional speech therapy services prior to discharge home   Therapy Recommendations Upon DC: Dysphagia Training, Patient / Family / Caregiver Education        Patient / Family Goals  Patient / Family Goal #1: to get better and go home  Short Term Goals  Short Term Goal # 1: Pt will consume SB6/TN0 diet without S/S of aspiration.      Ubaldo Edgar MS, CCC-SLP

## 2023-07-03 NOTE — THERAPY
Physical Therapy   Initial Evaluation     Patient Name: Nyasia Schuster  Age:  67 y.o., Sex:  female  Medical Record #: [id number]  Today's Date: 7/3/2023     Precautions  Precautions: (P) Fall Risk  Comments: (P) R sided weakness, R UE> R LE    Assessment  Patient is 67 y.o. female who presented with R sided weakness possibly secondary to ischemic event versus mass lesion versus demyelinating process per medical chart. Pt was agreeable to therapy evaluation and presented to PT with impaired balance, impaired coordination, R sided weakness, poor postural control, and dec activity tolerance. Pt was primarily limited by R sided weakness (especially R UE) and poor upright balance/proprioception in dynamic functional standing. Pt was able to demonstrate with Min to Mod A for standing, transfers, and few steps. Pt demonstrated with a narrow JELENA upon standing and required frequent VC and visual cues to maintain wide stance. Pt demonstrated with significant R sided leaning upon standing and during steps forward and back. Pt is a high fall risk at this time, and will continue to benefit from skilled PT while in house, with recommendation for post acute rehab therapy services prior to d/c home. Will benefit from PM&R consult.     Plan    Physical Therapy Initial Treatment Plan   Treatment Plan : (P) Bed Mobility, Equipment, Gait Training, Manual Therapy, Neuro Re-Education / Balance, Self Care / Home Evaluation, Stair Training, Therapeutic Activities, Therapeutic Exercise  Treatment Frequency: (P) 5 Times per Week  Duration: (P) Until Therapy Goals Met    DC Equipment Recommendations: (P) Unable to determine at this time  Discharge Recommendations: (P) Recommend post-acute placement for additional physical therapy services prior to discharge home (will benefit from PM&R consult for inpatient rehab services)     Objective     07/03/23 0945   Initial Contact Note    Initial Contact Note Order Received and Verified, Physical  Therapy Evaluation in Progress with Full Report to Follow.   Precautions   Precautions Fall Risk   Comments R sided weakness, R UE> R LE   Vitals   O2 (LPM) 3   O2 Delivery Device Nasal Cannula   Pain 0 - 10 Group   Therapist Pain Assessment Nurse Notified;0   Prior Living Situation   Prior Services None   Housing / Facility 2 Story House  (states she can stay on first floor if needed, normaly stays on second)   Steps Into Home 5   Steps In Home 15   Rail Both Rail (Steps into Home);Left Rail (Steps in Home)   Equipment Owned None   Lives with - Patient's Self Care Capacity Adult Children   Comments pt states she lives with her son who can provide intermittent assist only. Also has sister that lives in same neighborhood   Prior Level of Functional Mobility   Bed Mobility Independent   Transfer Status Independent   Ambulation Independent   Ambulation Distance   (community)   Assistive Devices Used None   Stairs Independent   History of Falls   History of Falls No   Cognition    Cognition / Consciousness WDL   Level of Consciousness Alert   Comments pleasant/cooperative   Passive ROM Upper Body   Passive ROM Upper Body WDL   Active ROM Upper Body   Active ROM Upper Body  X   Comments presents with no functional strength/AROM in R UE   Strength Upper Body   Upper Body Strength  X   Comments appears to demonstrate gross strength of 2/5 in proximal region of R UE and noted some finger flexion with gripping, however, non functional   Sensation Upper Body   Upper Extremity Sensation  X   Comments states of numbness of R hand, however, during testing pt able to report sensation, pain, and temp   Upper Body Muscle Tone   Upper Body Muscle Tone  WDL   Passive ROM Lower Body   Passive ROM Lower Body WDL   Active ROM Lower Body    Active ROM Lower Body  WDL   Strength Lower Body   Lower Body Strength  X   Comments presents with 4+/5 strength on R LE and 5/5 for L LE, functional for standing and a few steps   Sensation Lower  Body   Lower Extremity Sensation   WDL   Lower Body Muscle Tone   Lower Body Muscle Tone  WDL   Neurological Concerns   Neurological Concerns Yes   Comments given R sided weakness   Coordination Upper Body   Coordination Not Tested   Coordination Lower Body    Coordination Lower Body  X   Comments presents with slightly impaired heel to shin with R LE, poor JELENA upon standing demonstrates with poor foot placement with R LE during steps forward and back   Vision   Vision Comments did not test   Balance Assessment   Sitting Balance (Static) Fair +   Sitting Balance (Dynamic) Fair   Standing Balance (Static) Fair -   Standing Balance (Dynamic) Poor +   Weight Shift Sitting Fair   Weight Shift Standing Poor   Comments w/fww use for standing with L UE and support of R UE from therapist   Bed Mobility    Comments found up in chair   Gait Analysis   Gait Level Of Assist Moderate Assist   Assistive Device Front Wheel Walker   Distance (Feet) 3   # of Times Distance was Traveled 1   Deviation Ataxic;Step To;Decreased Base Of Support;Shuffled Gait;Decreased Heel Strike;Decreased Toe Off   Weight Bearing Status fwb   Comments significant R sided leaning during attempts to ambulate   Functional Mobility   Sit to Stand Minimal Assist   Bed, Chair, Wheelchair Transfer Moderate Assist   Transfer Method Stand Step   Mobility sit<>stand, ambulation forward and back, transfer back to chair   How much difficulty does the patient currently have...   Turning over in bed (including adjusting bedclothes, sheets and blankets)? 4   Sitting down on and standing up from a chair with arms (e.g., wheelchair, bedside commode, etc.) 1   Moving from lying on back to sitting on the side of the bed? 2   How much help from another person does the patient currently need...   Moving to and from a bed to a chair (including a wheelchair)? 3   Need to walk in a hospital room? 2   Climbing 3-5 steps with a railing? 1   6 clicks Mobility Score 13   Activity  Tolerance   Sitting in Chair functinal   Sitting Edge of Bed NT   Standing 10 mins   Comments no adverse events noted   Edema / Skin Assessment   Edema / Skin  Not Assessed   Patient / Family Goals    Patient / Family Goal #1 to go back to PLOF   Short Term Goals    Short Term Goal # 1 pt will go supine<>sit w/hob flat and rails down w/spv in 6tx   Short Term Goal # 2 pt will go sit<>stand w/LRD w/spv in 6tx   Short Term Goal # 3 pt will transfer bed<>chair w/LRD w/CGA in 6tx   Short Term Goal # 4 pt will ambulate for 50ft w/LRD w/Min A in 6tx   Education Group   Education Provided Role of Physical Therapist   Role of Physical Therapist Patient Response Patient;Acceptance;Explanation;Demonstration;Verbal Demonstration;Action Demonstration   Physical Therapy Initial Treatment Plan    Treatment Plan  Bed Mobility;Equipment;Gait Training;Manual Therapy;Neuro Re-Education / Balance;Self Care / Home Evaluation;Stair Training;Therapeutic Activities;Therapeutic Exercise   Treatment Frequency 5 Times per Week   Duration Until Therapy Goals Met   Problem List    Problems Impaired Bed Mobility;Impaired Transfers;Impaired Ambulation;Functional Strength Deficit;Impaired Balance;Functional ROM Deficit;Impaired Coordination;Decreased Activity Tolerance;Other (Comments)  (poor motor control and equilibrium reaction)   Anticipated Discharge Equipment and Recommendations   DC Equipment Recommendations Unable to determine at this time   Discharge Recommendations Recommend post-acute placement for additional physical therapy services prior to discharge home  (will benefit from PM&R consult for inpatient rehab services)   Interdisciplinary Plan of Care Collaboration   IDT Collaboration with  Nursing   Patient Position at End of Therapy Seated;Chair Alarm On;Call Light within Reach;Tray Table within Reach;Phone within Reach;Family / Friend in Room   Collaboration Comments aware of visit and recs   Session Information   Date / Session  Number  7/3-1 (1/5, 7/9)

## 2023-07-03 NOTE — PROGRESS NOTES
"[facility] Medicine Daily Progress Note    Date of Service  7/3/2023    Chief Complaint  Nyasia Schuster is a 67 y.o. female admitted 7/2/2023 with slurred speech and right arm weakness    Hospital Course  Patient is a 67 year old female who presented to [facility]/2023 with dysarthria and right arm weakness. She has a history of tobacco use and diabetes.  She moved up to the [facility] to help care for her mom several years ago, and lost her insurance and has been off of metformin ever since.  She had been on no prescribed medications and had not seen a physician in several years due to her insurance situation.     She reported that 2 days prior to admission, she noticed some difficulty with her speech.  It seemed like it was difficult for her to form words. Around the same time she noticed that her right arm was weak.  Patient is right-hand-dominant.  Her sister who was at bedside and lives next to the patient also noticed the speech difficulty       Patient reported a little numbness on the right side of her face, and her right arm felt like it was asleep.  She was able to walk, but stated that she ended up walking in circles.  She was unable to tell me if that is because of weakness or dizziness.  This is however not normal for her.  Sister notes that 2 days prior to admission, it sounds like \"she was drunk\". Patient was saying the right words, but they were just slurred.    Interval Problem Update  Axox3, she reports stable and unchanged right arm weakness and slurred speech, the slurred speech is mild. She reports stable headache, mainly R frontal, currently rated 4/10, no visual changes. Tele so far unremarkable. Awaiting mri and echo. ROS otherwise negative.   I have discussed this patient's plan of care and discharge plan at IDT rounds today with Case Management, Nursing, Nursing leadership, and other members of the IDT team.    Consultants/Specialty  Neurology    Code Status  Full Code    Disposition    I " have placed the appropriate orders for post-discharge needs.    Review of Systems  Review of Systems   Constitutional: Negative.  Negative for chills, diaphoresis, fever, malaise/fatigue and weight loss.   HENT: Negative.  Negative for sore throat.    Eyes: Negative.  Negative for blurred vision.   Respiratory: Negative.  Negative for cough and shortness of breath.    Cardiovascular: Negative.  Negative for chest pain, palpitations and leg swelling.   Gastrointestinal: Negative.  Negative for abdominal pain, nausea and vomiting.   Genitourinary: Negative.  Negative for dysuria.   Musculoskeletal: Negative.  Negative for myalgias.   Skin: Negative.  Negative for itching and rash.   Neurological:  Positive for sensory change and focal weakness. Negative for dizziness, weakness and headaches.   Endo/Heme/Allergies: Negative.  Does not bruise/bleed easily.   Psychiatric/Behavioral: Negative.  Negative for depression, substance abuse and suicidal ideas.    All other systems reviewed and are negative.       Physical Exam  Temp:  [36.3 °C (97.3 °F)-37 °C (98.6 °F)] 37 °C (98.6 °F)  Pulse:  [80-90] 85  Resp:  [18] 18  BP: (105-174)/(63-92) 108/74  SpO2:  [88 %-96 %] 94 %    Physical Exam  Vitals and nursing note reviewed. Exam conducted with a chaperone present.   Constitutional:       General: She is not in acute distress.     Appearance: Normal appearance. She is not diaphoretic.   HENT:      Head: Normocephalic.      Nose: Nose normal.      Mouth/Throat:      Mouth: Mucous membranes are moist.   Eyes:      Pupils: Pupils are equal, round, and reactive to light.   Cardiovascular:      Rate and Rhythm: Normal rate and regular rhythm.      Pulses: Normal pulses.      Heart sounds: Normal heart sounds.   Pulmonary:      Effort: Pulmonary effort is normal.      Breath sounds: Normal breath sounds.   Abdominal:      General: Abdomen is flat. Bowel sounds are normal.      Palpations: Abdomen is soft.   Musculoskeletal:          General: No swelling or deformity. Normal range of motion.   Skin:     General: Skin is warm and dry.      Capillary Refill: Capillary refill takes less than 2 seconds.   Neurological:      General: No focal deficit present.      Mental Status: She is alert and oriented to person, place, and time.      Cranial Nerves: No cranial nerve deficit.      Motor: Weakness (R) present.   Psychiatric:         Mood and Affect: Mood normal.         Behavior: Behavior normal.         Fluids    Intake/Output Summary (Last 24 hours) at 7/3/2023 1414  Last data filed at 7/3/2023 1111  Gross per 24 hour   Intake 2133.93 ml   Output --   Net 2133.93 ml       Laboratory  Recent Labs     07/02/23  0730   WBC 9.5   RBC 5.33   HEMOGLOBIN 16.2*   HEMATOCRIT 46.6   MCV 87.4   MCH 30.4   MCHC 34.8   RDW 39.2   PLATELETCT 170   MPV 11.3     Recent Labs     07/02/23 0730 07/03/23  0222   SODIUM 137 139   POTASSIUM 4.1 4.0   CHLORIDE 101 103   CO2 22 27   GLUCOSE 316* 161*   BUN 7* 5*   CREATININE 0.42* 0.46*   CALCIUM 9.3 9.1     Recent Labs     07/02/23 0730   APTT 30.5   INR 0.99         Recent Labs     07/03/23  0222   TRIGLYCERIDE 87   HDL 32*   LDL 90       Imaging  CT-CTA NECK WITH & W/O-POST PROCESSING   Final Result      CT angiogram of the neck within normal limits.      Emphysematous changes.      CT-CTA HEAD WITH & W/O-POST PROCESS   Final Result      1.  Moderate stenosis of the junction of the M1/M2 segment of the left middle cerebral artery.   2.  Probable proximal left PCA stenosis.   3.  No large vessel occlusion or aneurysm.      CT-HEAD W/O   Final Result      1.  Chronic microvascular ischemic type changes.   2.  No acute intracranial abnormality.         DX-CHEST-PORTABLE (1 VIEW)   Final Result         1.  No acute cardiopulmonary disease.   2.  Atherosclerosis      EC-ECHOCARDIOGRAM COMPLETE W/O CONT    (Results Pending)   MR-BRAIN-W/O    (Results Pending)        Assessment/Plan  Hemiparesis of right dominant side  (HCC)  Assessment & Plan  CT scan is negative  Differential includes ischemic event versus mass lesion versus demyelinating process  Continue tele  Continue serial  neuro exams  continue aspirin  Continue statin  Echo pending  Discussed with neuro - they will eval  Mri pending    Type 2 diabetes mellitus without complication, without long-term current use of insulin (HCC)  Assessment & Plan  Patient had been prescribed metformin several years ago but lost her insurance and has not been on any medication since.  continue sliding scale  A1c >12  Speech eval pending  Can likely resume metformin once passes swallow eval   Start more definitive management once we know what her A1c is and her p.o. intake has been resumed    Tobacco abuse  Assessment & Plan  Patient counseled x5 minutes.  As needed replacement while in house.         VTE prophylaxis: enoxaparin ppx    I have performed a physical exam and reviewed and updated ROS and Plan today (7/3/2023). In review of yesterday's note (7/2/2023), there are no changes except as documented above.

## 2023-07-03 NOTE — DISCHARGE PLANNING
[facility] Transitional Care Coordination    Referral from:  [place]  Insurance Provider on Facesheet:none listed  Potential Rehab Diagnosis: R/O stroke    Chart review indicates patient may have on going medical management and may have therapy needs to possibly meet inpatient rehab facility criteria with the goal of returning to community.    D/C support: will need to verify d/c support      Physiatry consultation pended per protocol.   Will need therapy notes when medically cleared         Thank you for the referral.

## 2023-07-03 NOTE — CARE PLAN
The patient is Stable - Low risk of patient condition declining or worsening    Shift Goals  Clinical Goals: No complications related to neuro status; Preventative skin interventions; blood glucose monitoring  Patient Goals: Comfort;    Progress made toward(s) clinical / shift goals:  Patient remains alert and oriented X 4. Neuro checks and NIHSS continued with no new complications observed. Patient received continuous oxygen at 2L/min. Blood glucose monitored and maintained within appropriate range. Pain management continued, as ordered.     Problem: Pain - Standard  Goal: Alleviation of pain or a reduction in pain to the patient’s comfort goal  Outcome: Progressing     Problem: Optimal Care of the Stroke Patient  Goal: Optimal emergency care for the stroke patient  Outcome: Progressing  Goal: Optimal acute care for the stroke patient  Outcome: Progressing     Problem: Knowledge Deficit - Stroke Education  Goal: Patient's knowledge of stroke and risk factors will improve  Outcome: Progressing     Problem: Psychosocial - Patient Condition  Goal: Patient's ability to verbalize feelings about condition will improve  Outcome: Progressing  Goal: Patient's ability to re-evaluate and adapt role responsibilities will improve  Outcome: Progressing     Problem: Discharge Planning - Stroke  Goal: Ensure Stroke Core Measures are met prior to discharge  Outcome: Progressing  Goal: Patient’s continuum of care needs will be met  Outcome: Progressing     Problem: Neuro Status  Goal: Neuro status will remain stable or improve  Outcome: Progressing     Problem: Hemodynamic Monitoring  Goal: Patient's hemodynamics, fluid balance and neurologic status will be stable or improve  Outcome: Progressing     Problem: Respiratory - Stroke Patient  Goal: Patient will achieve/maintain optimum respiratory rate/effort  Outcome: Progressing     Problem: Dysphagia  Goal: Dysphagia will improve  Outcome: Progressing     Problem: Risk for  Aspiration  Goal: Patient's risk for aspiration will be absent or decrease  Outcome: Progressing     Problem: Urinary Elimination  Goal: Establish and maintain regular urinary output  Outcome: Progressing     Problem: Bowel Elimination  Goal: Establish and maintain regular bowel function  Outcome: Progressing     Problem: Mobility - Stroke  Goal: Patient's capacity to carry out activities will improve  Outcome: Progressing  Goal: Spasticity will be prevented or improved  Outcome: Progressing  Goal: Subluxation will be prevented or improved  Outcome: Progressing     Problem: Self Care  Goal: Patient will have the ability to perform ADLs independently or with assistance (bathe, groom, dress, toilet and feed)  Outcome: Progressing     Problem: Knowledge Deficit - Standard  Goal: Patient and family/care givers will demonstrate understanding of plan of care, disease process/condition, diagnostic tests and medications  Outcome: Progressing       Patient is not progressing towards the following goals:

## 2023-07-03 NOTE — CARE PLAN
Problem: Pain - Standard  Goal: Alleviation of pain or a reduction in pain to the patient’s comfort goal  Description: Target End Date:  Prior to discharge or change in level of care    Document on Vitals flowsheet    1.  Document pain using the appropriate pain scale per order or unit policy  2.  Educate and implement non-pharmacologic comfort measures (i.e. relaxation, distraction, massage, cold/heat therapy, etc.)  3.  Pain management medications as ordered  4.  Reassess pain after pain med administration per policy  5.  If opiods administered assess patient's response to pain medication is appropriate per POSS sedation scale  6.  Follow pain management plan developed in collaboration with patient and interdisciplinary team (including palliative care or pain specialists if applicable)  Outcome: Progressing     Problem: Knowledge Deficit - Stroke Education  Goal: Patient's knowledge of stroke and risk factors will improve  Description: Target End Date:  1-3 days or as soon as patient condition allows    Document in Patient Education    1.  Stroke education booklet provided  2.  Education regarding EMS activation, need for follow up, medication prescribed at discharge, risk factors for stroke/lifestyle modifications, warning signs and symptoms of stroke provided  Outcome: Progressing   The patient is Watcher - Medium risk of patient condition declining or worsening         Progress made toward(s) clinical / shift goals:      Patient is not progressing towards the following goals:

## 2023-07-03 NOTE — CONSULTS
Neurology Initial Consult H&P  Neurohospitalist Service, [facility]    Referring Physician: Ainsley Mcfarland M.D.    Chief Complaint   Patient presents with    Slurred Speech     Since Friday @ 1000    Unilateral Weakness     Right sided weakness. Right arm strength 1/5 compared to left arm 5/5, also starting @ 1000 on friday       HPI: Nyasia Schuster is a 67 year old woman, active smoker, type 2 diabetes, however off the healthcare grid, presenting with slurred speech and R side weakness.  She reports that for the past few weeks, she has been experiencing numbness in her R face and arm.  [place] and [place] that she was feeling weak on the R side and slurring her words.  She went to bed, and woke up the following day with persistent symptoms that progressed to near complete paralysis of there R arm.  She finally came in to the ER for evaluation yesterday morning.  A head CT did not reveal a large territory stroke.  CTA head and neck with diffuse atherostenotic disease, however without critical stenoses.  On ROS, she reports no infectious prodrome, no constitutional symptoms.  She reports not seeing a PCP because she does not have the financial resources.  She does take a daily ASA and does not miss any doses.  She has been smoking 2ppd for a very long time.    Review of systems: In addition to what is detailed in the HPI above, all other systems reviewed and are negative.    Past Medical History:   Diabetes    FHx:  No known family history of early strokes or blood clotting disorder    SHx:   reports that she has been smoking cigarettes. She has been smoking an average of 2 packs per day. She has never used smokeless tobacco. She reports that she does not currently use alcohol. She reports that she does not currently use drugs.    Allergies:  Not on File    Medications:    Current Facility-Administered Medications:     insulin GLARGINE (Lantus,Semglee)  injection, 0.2 Units/kg/day, Subcutaneous, Q EVENING **AND** insulin lispro (HumaLOG,AdmeLOG) injection, 2-9 Units, Subcutaneous, 4X/DAY ACHS **AND** POC blood glucose manual result, , , Q AC AND BEDTIME(S) **AND** NOTIFY MD and PharmD, , , Once **AND** Administer 20 grams of glucose (approximately 8 ounces of fruit juice) every 15 minutes PRN FSBG less than 70 mg/dL, , , PRN **AND** dextrose 10 % BOLUS 25 g, 25 g, Intravenous, Q15 MIN PRN, Ainsley Mcfarland M.D.    lidocaine (Lidoderm) 5 % 2 Patch, 2 Patch, Transdermal, Daily-1400, Ainsley Mcfarland M.D.    senna-docusate (Pericolace Or Senokot S) 8.6-50 MG per tablet 2 Tablet, 2 Tablet, Oral, BID, 2 Tablet at 07/02/23 1822 **AND** polyethylene glycol/lytes (Miralax) PACKET 1 Packet, 1 Packet, Oral, QDAY PRN **AND** magnesium hydroxide (Milk Of Magnesia) suspension 30 mL, 30 mL, Oral, QDAY PRN **AND** bisacodyl (Dulcolax) suppository 10 mg, 10 mg, Rectal, QDAY PRN, ROSALIND CallawayOEaston    Respiratory Therapy Consult, , Nebulization, Continuous RT, Reji Angel D.O.    lactated ringers infusion, , Intravenous, Continuous, NILSA Callaway.LUIS, Stopped at 07/03/23 1109    enoxaparin (Lovenox) inj 40 mg, 40 mg, Subcutaneous, DAILY AT 1800, NILSA Callaway.OEaston, 40 mg at 07/02/23 1823    acetaminophen (Tylenol) tablet 650 mg, 650 mg, Oral, Q6HRS PRN, NILSA Callaway.O., 650 mg at 07/02/23 1326    ondansetron (Zofran) syringe/vial injection 4 mg, 4 mg, Intravenous, Q4HRS PRN, NILSA Callaway.O.    ondansetron (Zofran ODT) dispertab 4 mg, 4 mg, Oral, Q4HRS PRN, Reji Angel D.O.    nicotine (Nicoderm) 21 MG/24HR 21 mg, 21 mg, Transdermal, Daily-0600, 21 mg at 07/03/23 0600 **AND** Nicotine Replacement Patient Education Materials, , , Once **AND** nicotine polacrilex (Nicorette) 2 MG piece 2 mg, 2 mg, Oral, Q HOUR PRN, Reji Angel D.O.    atorvastatin (Lipitor) tablet 40 mg, 40 mg, Oral, Q EVENING,  Reji Angel D.O., 40 mg at 07/02/23 1822    aspirin (Asa) chewable tab 81 mg, 81 mg, Oral, DAILY, 81 mg at 07/03/23 0531 **OR** aspirin (Asa) suppository 300 mg, 300 mg, Rectal, DAILY, Reji Angel D.O.    LORazepam (Ativan) injection 1 mg, 1 mg, Intravenous, Q6HRS PRN, Reji Angel D.O.    oxyCODONE immediate-release (Roxicodone) tablet 5 mg, 5 mg, Oral, Q4HRS PRN **OR** oxyCODONE immediate release (Roxicodone) tablet 10 mg, 10 mg, Oral, Q4HRS PRN, Reji Angel D.O., 10 mg at 07/03/23 0020    Physical Examination:     General: Patient is awake and in no acute distress  Eye: Examination of optic disks not indicated at this time given acuity of consult  Neck: There is normal range of motion  CV: regular rate   Extremities:  clear, dry, intact, without peripheral edema    NEUROLOGICAL EXAM:     /77   Pulse 89   Temp 37.4 °C (99.3 °F) (Temporal)   Resp 18   Ht 1.524 m (5')   Wt 74.8 kg (165 lb)   SpO2 91%   BMI 32.22 kg/m²       Mental status: Awake, alert and fully oriented  Speech and language: Speech is mildly dysarthric but fluent. The patient is able to name and repeat, and follow commands  Cranial nerve exam: Visual fields are full. There is no nystagmus. Extraocular muscles are intact. Slight R facial droop.  Motor exam: There is sustained antigravity with no downward drift in L arm and leg.  R arm with slight shrug, no antigravity strength.  R leg antigravity with slight drift  Sensory exam: Reacts to tactile in all 4 extremities, no neglect to double stim.  There is diminished sensation to R arm and face  Coordination: No ataxia on FTN testing on L  Gait: Deferred due to patient preference    NIHSS: National Institutes of Health Stroke Scale    [0] 1a:Level of Consciousness    0-alert 1-drowsy   2-stupor   3-coma  [0] 1b:LOC Questions                  0-both  1-one      2-neither  [0] 1c:LOC Commands                   0-both  1-one      2-neither  [0]  2: Best Gaze                     0-nl    1-partial  2-forced  [0] 3: Visual Fields                   0-nl    1-partial  2-complete 3-bilat  [1] 4: Facial Paresis                0-nl    1-minor    2-partial  3-full  MOTOR                       0-nl  [3] 5: Right Arm           1-drift  [0] 6: Left Arm             2-some effort vs gravity  [1] 7: Right Leg           3-no effort vs gravity  [0] 8: Left Leg             4-no movement                             x-untestable  [0] 9: Limb Ataxia                    0-abs   1-1_limb   2-2+_limbs       x-untestable  [1] 10:Sensory                        0-nl    1-partial  2-dense  [0] 11:Best Language/Aphasia         0-nl    1-mild/mod 2-severe   3-mute  [1] 12:Dysarthria                     0-nl    1-mild/mod 2-severe       x-untestable  [0] 13:Neglect/Inattention            0-none  1-partial  2-complete  [7] TOTAL      Objective Data:    Labs:  Lab Results   Component Value Date/Time    PROTHROMBTM 13.0 07/02/2023 07:30 AM    INR 0.99 07/02/2023 07:30 AM      Lab Results   Component Value Date/Time    WBC 9.5 07/02/2023 07:30 AM    RBC 5.33 07/02/2023 07:30 AM    HEMOGLOBIN 16.2 (H) 07/02/2023 07:30 AM    HEMATOCRIT 46.6 07/02/2023 07:30 AM    MCV 87.4 07/02/2023 07:30 AM    MCH 30.4 07/02/2023 07:30 AM    MCHC 34.8 07/02/2023 07:30 AM    MPV 11.3 07/02/2023 07:30 AM    NEUTSPOLYS 67.30 07/02/2023 07:30 AM    LYMPHOCYTES 21.10 (L) 07/02/2023 07:30 AM    MONOCYTES 7.50 07/02/2023 07:30 AM    EOSINOPHILS 3.10 07/02/2023 07:30 AM    BASOPHILS 0.60 07/02/2023 07:30 AM      Lab Results   Component Value Date/Time    SODIUM 139 07/03/2023 02:22 AM    POTASSIUM 4.0 07/03/2023 02:22 AM    CHLORIDE 103 07/03/2023 02:22 AM    CO2 27 07/03/2023 02:22 AM    GLUCOSE 161 (H) 07/03/2023 02:22 AM    BUN 5 (L) 07/03/2023 02:22 AM    CREATININE 0.46 (L) 07/03/2023 02:22 AM      Lab Results   Component Value Date/Time    CHOLSTRLTOT 139 07/03/2023 02:22 AM    LDL 90 07/03/2023 02:22 AM    HDL  32 (A) 07/03/2023 02:22 AM    TRIGLYCERIDE 87 07/03/2023 02:22 AM       Lab Results   Component Value Date/Time    ALKPHOSPHAT 84 07/02/2023 07:30 AM    ASTSGOT 15 07/02/2023 07:30 AM    ALTSGPT 13 07/02/2023 07:30 AM    TBILIRUBIN 0.5 07/02/2023 07:30 AM        Imaging/Testing:    I interpreted and/or reviewed the patient's neuroimaging    EC-ECHOCARDIOGRAM COMPLETE W/O CONT         CT-CTA NECK WITH & W/O-POST PROCESSING   Final Result      CT angiogram of the neck within normal limits.      Emphysematous changes.      CT-CTA HEAD WITH & W/O-POST PROCESS   Final Result      1.  Moderate stenosis of the junction of the M1/M2 segment of the left middle cerebral artery.   2.  Probable proximal left PCA stenosis.   3.  No large vessel occlusion or aneurysm.      CT-HEAD W/O   Final Result      1.  Chronic microvascular ischemic type changes.   2.  No acute intracranial abnormality.         DX-CHEST-PORTABLE (1 VIEW)   Final Result         1.  No acute cardiopulmonary disease.   2.  Atherosclerosis      MR-BRAIN-W/O    (Results Pending)       Assessment and Plan:  Nyasia Schuster is a 67 year old woman presenting with R side weakness, R side sensory changes, mild dysarthria- well outside window for acute stroke interventions.  MRI is pending.  Clinical semiology most consistent with small vessel (lacunar) etiology.  At this time, will initiate short-term DAPT, high intensity statin, BP control, and smoking cessation as secondary stroke prevention.    Problem list:   Stroke  Hyperlipidemia  Type 2 diabetes  Smoker    Plan:   - q4h neurochecks/NIHSS   - MRI brain without contrast   - continue ASA 81mg daily for additional 10 days (stop date is 7/13)   - start plavix 75mg daily indefinitely (I have ordered this)   - no need for acute hypertensive response at this time, long-term BP goal is 110-130/60-80; currently at goal without interventions   - stroke labs:  HgbA1c 12.6, LDL 90   - continue atorvastatin 40mg daily  for goal LDL < 70   - DM management for goal HgbA1c < 7, acutely aim for FSBS    - TTE completed, results pending- may defer ziopatch monitoring unless MRI reveals embolic stroke   - PT/OT/SLP   - continue lovenox SQ for DVT chemoppx   - smoking cessation counseling    The evaluation of the patient, and recommended management, was discussed with Dr. Mcfarland, attending hospitalist    Vega Santos MD  Vascular Neurology

## 2023-07-04 ENCOUNTER — APPOINTMENT (OUTPATIENT)
Dept: RADIOLOGY | Facility: MEDICAL CENTER | Age: 68
DRG: 065 | End: 2023-07-04
Attending: INTERNAL MEDICINE
Payer: MEDICARE

## 2023-07-04 LAB
GLUCOSE BLD STRIP.AUTO-MCNC: 137 MG/DL (ref 65–99)
GLUCOSE BLD STRIP.AUTO-MCNC: 141 MG/DL (ref 65–99)
GLUCOSE BLD STRIP.AUTO-MCNC: 162 MG/DL (ref 65–99)
GLUCOSE BLD STRIP.AUTO-MCNC: 198 MG/DL (ref 65–99)

## 2023-07-04 PROCEDURE — 97166 OT EVAL MOD COMPLEX 45 MIN: CPT

## 2023-07-04 PROCEDURE — 770020 HCHG ROOM/CARE - TELE (206)

## 2023-07-04 PROCEDURE — 99223 1ST HOSP IP/OBS HIGH 75: CPT | Performed by: PHYSICAL MEDICINE & REHABILITATION

## 2023-07-04 PROCEDURE — 70551 MRI BRAIN STEM W/O DYE: CPT

## 2023-07-04 PROCEDURE — 700102 HCHG RX REV CODE 250 W/ 637 OVERRIDE(OP): Performed by: PSYCHIATRY & NEUROLOGY

## 2023-07-04 PROCEDURE — 97535 SELF CARE MNGMENT TRAINING: CPT

## 2023-07-04 PROCEDURE — 700102 HCHG RX REV CODE 250 W/ 637 OVERRIDE(OP): Performed by: HOSPITALIST

## 2023-07-04 PROCEDURE — 82962 GLUCOSE BLOOD TEST: CPT | Mod: 91

## 2023-07-04 PROCEDURE — 700111 HCHG RX REV CODE 636 W/ 250 OVERRIDE (IP): Mod: JZ | Performed by: HOSPITALIST

## 2023-07-04 PROCEDURE — 92523 SPEECH SOUND LANG COMPREHEN: CPT

## 2023-07-04 PROCEDURE — 99232 SBSQ HOSP IP/OBS MODERATE 35: CPT | Performed by: INTERNAL MEDICINE

## 2023-07-04 PROCEDURE — A9270 NON-COVERED ITEM OR SERVICE: HCPCS | Performed by: HOSPITALIST

## 2023-07-04 PROCEDURE — 99232 SBSQ HOSP IP/OBS MODERATE 35: CPT | Performed by: PSYCHIATRY & NEUROLOGY

## 2023-07-04 PROCEDURE — A9270 NON-COVERED ITEM OR SERVICE: HCPCS | Performed by: PSYCHIATRY & NEUROLOGY

## 2023-07-04 RX ADMIN — ENOXAPARIN SODIUM 40 MG: 100 INJECTION SUBCUTANEOUS at 17:40

## 2023-07-04 RX ADMIN — INSULIN LISPRO 2 UNITS: 100 INJECTION, SOLUTION INTRAVENOUS; SUBCUTANEOUS at 20:34

## 2023-07-04 RX ADMIN — INSULIN LISPRO 2 UNITS: 100 INJECTION, SOLUTION INTRAVENOUS; SUBCUTANEOUS at 17:34

## 2023-07-04 RX ADMIN — OXYCODONE HYDROCHLORIDE 10 MG: 10 TABLET ORAL at 20:33

## 2023-07-04 RX ADMIN — CLOPIDOGREL BISULFATE 75 MG: 75 TABLET ORAL at 05:09

## 2023-07-04 RX ADMIN — SENNOSIDES AND DOCUSATE SODIUM 2 TABLET: 50; 8.6 TABLET ORAL at 17:40

## 2023-07-04 RX ADMIN — ASPIRIN 81 MG 81 MG: 81 TABLET ORAL at 05:08

## 2023-07-04 RX ADMIN — ATORVASTATIN CALCIUM 40 MG: 40 TABLET, FILM COATED ORAL at 17:40

## 2023-07-04 RX ADMIN — NICOTINE TRANSDERMAL SYSTEM 21 MG: 21 PATCH, EXTENDED RELEASE TRANSDERMAL at 05:09

## 2023-07-04 ASSESSMENT — PAIN DESCRIPTION - PAIN TYPE
TYPE: ACUTE PAIN
TYPE: ACUTE PAIN

## 2023-07-04 ASSESSMENT — ENCOUNTER SYMPTOMS
FOCAL WEAKNESS: 1
WEAKNESS: 0
VOMITING: 0
GASTROINTESTINAL NEGATIVE: 1
DEPRESSION: 0
COUGH: 0
ABDOMINAL PAIN: 0
CARDIOVASCULAR NEGATIVE: 1
HEADACHES: 0
RESPIRATORY NEGATIVE: 1
NAUSEA: 0
CHILLS: 0
PALPITATIONS: 0
SHORTNESS OF BREATH: 0
MYALGIAS: 0
WEIGHT LOSS: 0
FEVER: 0
PSYCHIATRIC NEGATIVE: 1
DIZZINESS: 0
SENSORY CHANGE: 1
MUSCULOSKELETAL NEGATIVE: 1
CONSTITUTIONAL NEGATIVE: 1
DIAPHORESIS: 0

## 2023-07-04 ASSESSMENT — ACTIVITIES OF DAILY LIVING (ADL): TOILETING: INDEPENDENT

## 2023-07-04 ASSESSMENT — COGNITIVE AND FUNCTIONAL STATUS - GENERAL
DRESSING REGULAR UPPER BODY CLOTHING: A LOT
EATING MEALS: A LOT
PERSONAL GROOMING: A LOT
TOILETING: A LOT
SUGGESTED CMS G CODE MODIFIER DAILY ACTIVITY: CL
HELP NEEDED FOR BATHING: A LOT
DRESSING REGULAR LOWER BODY CLOTHING: A LOT
DAILY ACTIVITIY SCORE: 12

## 2023-07-04 ASSESSMENT — LIFESTYLE VARIABLES: SUBSTANCE_ABUSE: 0

## 2023-07-04 NOTE — PROGRESS NOTES
"[facility] Medicine Daily Progress Note    Date of Service  7/4/2023    Chief Complaint  Nyasia Schuster is a 67 y.o. female admitted 7/2/2023 with slurred speech and right arm weakness    Hospital Course  Patient is a 67 year old female who presented to [facility] 7/2/2023 with dysarthria and right arm weakness. She has a history of tobacco use and diabetes.  She moved up to the [facility] to help care for her mom several years ago, and lost her insurance and has been off of metformin ever since.  She had been on no prescribed medications and had not seen a physician in several years due to her insurance situation.     She reported that 2 days prior to admission, she noticed some difficulty with her speech.  It seemed like it was difficult for her to form words. Around the same time she noticed that her right arm was weak.  Patient is right-hand-dominant.  Her sister who was at bedside and lives next to the patient also noticed the speech difficulty       Patient reported a little numbness on the right side of her face, and her right arm felt like it was asleep.  She was able to walk, but stated that she ended up walking in circles.  She was unable to tell me if that is because of weakness or dizziness.  This is however not normal for her.  Sister notes that 2 days prior to admission, it sounds like \"she was drunk\". Patient was saying the right words, but they were just slurred.    Interval Problem Update  7/3 Axox3, she reports stable and unchanged right arm weakness and slurred speech, the slurred speech is mild. She reports stable headache, mainly R frontal, currently rated 4/10, no visual changes. Tele so far unremarkable. Awaiting mri and echo. ROS otherwise negative.     7/4 vital signs stable on 3 L nasal cannula.  Glucose within goal.  Echo shows EF 65 to 70% with RVSP 30 mmHg.  MRI of the brain is done pending read.  Neurology recommending aspirin for 10 days stop date 7/13 and Plavix indefinitely.  PT/OT " recommending postacute placement.  PM&R determined patient is a good candidate for inpatient rehab.  Patient reports that she was able to walk with a walker still having the right upper extremity weakness with some numbness in her hand.    I have discussed this patient's plan of care and discharge plan at IDT rounds today with Case Management, Nursing, Nursing leadership, and other members of the IDT team.    Consultants/Specialty  Neurology  PM&R    Code Status  Full Code    Disposition  Patient is not medically clear, anticipate discharge to inpatient rehab  I have placed the appropriate orders for post-discharge needs.    Review of Systems  Review of Systems   Constitutional: Negative.  Negative for chills, diaphoresis, fever, malaise/fatigue and weight loss.   Respiratory: Negative.  Negative for cough and shortness of breath.    Cardiovascular: Negative.  Negative for chest pain, palpitations and leg swelling.   Gastrointestinal: Negative.  Negative for abdominal pain, nausea and vomiting.   Musculoskeletal: Negative.  Negative for myalgias.   Neurological:  Positive for sensory change and focal weakness. Negative for dizziness, weakness and headaches.   Psychiatric/Behavioral: Negative.  Negative for depression, substance abuse and suicidal ideas.    All other systems reviewed and are negative.       Physical Exam  Temp:  [36.9 °C (98.4 °F)-37.6 °C (99.7 °F)] 37.6 °C (99.7 °F)  Pulse:  [] 100  Resp:  [17-18] 18  BP: (118-152)/(77-91) 152/90  SpO2:  [91 %-95 %] 95 %    Physical Exam  Vitals and nursing note reviewed. Exam conducted with a chaperone present.   Constitutional:       General: She is not in acute distress.     Appearance: Normal appearance. She is not diaphoretic.   HENT:      Head: Normocephalic.   Eyes:      Conjunctiva/sclera: Conjunctivae normal.      Pupils: Pupils are equal, round, and reactive to light.   Cardiovascular:      Rate and Rhythm: Normal rate and regular rhythm.       Pulses: Normal pulses.      Heart sounds: Normal heart sounds.   Pulmonary:      Effort: Pulmonary effort is normal.      Breath sounds: Normal breath sounds.   Abdominal:      General: Abdomen is flat. There is no distension.      Palpations: Abdomen is soft.      Tenderness: There is no abdominal tenderness.   Musculoskeletal:         General: No swelling or deformity. Normal range of motion.   Skin:     General: Skin is warm and dry.      Capillary Refill: Capillary refill takes less than 2 seconds.   Neurological:      Mental Status: She is alert and oriented to person, place, and time.      Sensory: Sensory deficit (right hand) present.      Motor: Weakness (RUE) present.   Psychiatric:         Mood and Affect: Mood normal.         Behavior: Behavior normal.         Thought Content: Thought content normal.         Fluids    Intake/Output Summary (Last 24 hours) at 7/4/2023 1435  Last data filed at 7/4/2023 0900  Gross per 24 hour   Intake 650 ml   Output --   Net 650 ml       Laboratory  Recent Labs     07/02/23  0730   WBC 9.5   RBC 5.33   HEMOGLOBIN 16.2*   HEMATOCRIT 46.6   MCV 87.4   MCH 30.4   MCHC 34.8   RDW 39.2   PLATELETCT 170   MPV 11.3     Recent Labs     07/02/23  0730 07/03/23  0222   SODIUM 137 139   POTASSIUM 4.1 4.0   CHLORIDE 101 103   CO2 22 27   GLUCOSE 316* 161*   BUN 7* 5*   CREATININE 0.42* 0.46*   CALCIUM 9.3 9.1     Recent Labs     07/02/23  0730   APTT 30.5   INR 0.99         Recent Labs     07/03/23  0222   TRIGLYCERIDE 87   HDL 32*   LDL 90       Imaging  EC-ECHOCARDIOGRAM COMPLETE W/O CONT   Final Result      CT-CTA NECK WITH & W/O-POST PROCESSING   Final Result      CT angiogram of the neck within normal limits.      Emphysematous changes.      CT-CTA HEAD WITH & W/O-POST PROCESS   Final Result      1.  Moderate stenosis of the junction of the M1/M2 segment of the left middle cerebral artery.   2.  Probable proximal left PCA stenosis.   3.  No large vessel occlusion or aneurysm.       CT-HEAD W/O   Final Result      1.  Chronic microvascular ischemic type changes.   2.  No acute intracranial abnormality.         DX-CHEST-PORTABLE (1 VIEW)   Final Result         1.  No acute cardiopulmonary disease.   2.  Atherosclerosis      MR-BRAIN-W/O    (Results Pending)        Assessment/Plan  Hemiparesis of right dominant side (HCC)  Assessment & Plan  CT scan is negative  Differential includes ischemic event versus mass lesion versus demyelinating process  Continue tele  Continue serial  neuro exams  Continue statin  Echo noted  Neurology consulted  -Aspirin for 10 days, stop date 7/13  -Plavix indefinitely  Mri done pending official read  PT/OT-postacute placement  PM&R-good candidate for IPR    Type 2 diabetes mellitus without complication, without long-term current use of insulin (HCC)  Assessment & Plan  Patient had been prescribed metformin several years ago but lost her insurance and has not been on any medication since.  continue sliding scale  A1c >12  Speech eval pending  Can likely resume metformin once passes swallow eval   Start more definitive management once we know what her A1c is and her p.o. intake has been resumed    Tobacco abuse  Assessment & Plan  Patient counseled x5 minutes.  As needed replacement while in house.         VTE prophylaxis: enoxaparin ppx    I have performed a physical exam and reviewed and updated ROS and Plan today (7/4/2023). In review of yesterday's note (7/3/2023), there are no changes except as documented above.

## 2023-07-04 NOTE — THERAPY
"Speech Language Pathology   Cognitive Evaluation     Patient Name: Nyasia Schuster  AGE:  67 y.o., SEX:  female  Medical Record #: [id number]  Date of Service: 7/4/2023      History of Present Illness  67 y.o. female who presents to the ED complaining of slurred speech onset 2 days ago. Patient states associated symptoms of unilateral weakness and numbness, that is worse in her right upper extremity that comes. She notes she takes baby aspirin. She denies a history of strokes. She admits to smoking 2 packs per day.  No MRI at time of eval.       Head CT 07/02/23:  1.  Chronic microvascular ischemic type changes.  2.  No acute intracranial abnormality.    Chest X-Ray 07/02/23:   No acute cardiopulmonary disease  Atherosclerosis     General Information  Vitals  O2 (LPM): 3  O2 Delivery Device: Nasal Cannula  Level of Consciousness: Awake, Alert  Orientation: Oriented x 4  Follows Directives: Yes  Prior Living Situation & Level of Function  Prior Services: None  Lives with - Patient's Self Care Capacity: Adult Children  Comments:  (Pt states she lives with her son who can provide intermittent assist only. Also has sister that lives next door.)  Communication: WNL     Subjective  Pt was cleared by RN for cognitive linguistic communication evaluation. Pt was AAO x4, pleasant, and agreeable to SLP POC. Pt reported some \"confusion.\" However, per pt report she has been noticing imporvement each day.    Communication Domain(s)  Expressive Language: WFL  Receptive Language: WFL  Cognitive-Linguistic: WFL       Assessment  Standardized and non-standardized assessment measures were utilized to complete today's evaluation.     Cognistat  The Cognistat is multidimensional screening measure of cognition designed to primarily diagnose cognitive impairments. Scores were yielded in the following domains: orientation, attention, language (repetition, naming subscales), verbal memory, calculation, and reasoning (similarities and " judgments subtests). Pt’s results are as follows:    Orientation: Average (12)  Attention: Average (8)  Comprehension: Average (6)  Repetition: Average (12)  Naming: Average (8)  Memory: Average (11)  Calculations: Average (12)  Similarities: Average (8)  Judgement: Average (6)    *Not all brain lesions produce cognitive deficits that will be detected by cognitstat. Average Range scores, therefore, cannot be taken as evidence that brain pathology does not exist. Similarly, scores falling in the Mild, Moderate, or Severe range of impairment do not necessarily reflect brain dysfunction.     Informal Measures   Informal measures to assess auditory comprehension were utilized during this evaluation including verbal presentation of one, two, and three step directions as well as simple/moderate/complex level of yes/no questions. Pt followed one step directions with 100% accuracy, two-step directions with 100% accuracy, and three-step directions with 100% accuracy. Pt was able to answer simple/moderate/complex yes/no questions with 100% accuracy.     Medication Management   Informal measures including a medication management task were utilized to assess the pt’s ability to participate in functional iADL activities. Pt demonstrated appropriate following of directions, problem solving, reasoning, organization, and task cohesion.     Clock Drawing   The clock drawing test (CDT) is a screening to in which the patient is asked to draw a clock. Placement of the numbers around the [place] requires visual-spatial, numerical sequencing, and planning abilities. The patient is then asked to the draw the hands on the clock to indicate “ten minutes past 11 o’clock” additionally assessing auditory processing, memory, motor programming, attention, planning, and executive skills. The pt’s clock drawing was assessed utilizing the Cognitive Linguistic Quick Test (CLQT) evaluation measure. The Pt demonstrated: appropriate numerical  spacing/orientation, arrangement, placement of hands, time indication, and different hand lengths.      Overall Score: 13/13     It is important to note that the pt is R handed at baseline; Therefore legibility was reduced during the clock-drawing task as it was completed with their L hand.       Clinical Impressions  The patient demonstrates cognitive linguistic communication abilities that are WNL; Therefore, skilled ST targeting cognitive linguistic communication skills is not indicated at this time. Should change in pt status occur, please contact SLP.     NOTE: It is not within the scope of practice of Speech-Language Pathologists to determine patient capacity. Please defer to the physician or psych to complete this assessment.     Recommendations  Supervision Needs Upons Discharge: Intermittent assistance with IADLs (see below)  IADLs: Household chores, Cooking (D/t R sided weakness. Pending Pt progress with PT/OT.)    SLP Treatment Plan  Treatment Plan: Dysphagia Treatment, Patient/Family/Caregiver Training  SLP Frequency: 3x Per Week  Estimated Duration: Until Therapy Goals Met    Anticipated Discharge Needs  Discharge Recommendations: Anticipate that the patient will have no further speech therapy needs after discharge from the hospital  Therapy Recommendations Upon DC: Dysphagia Training, Patient / Family / Caregiver Education    Patient / Family Goals  Patient / Family Goal #1: to get better and go home  Goal #1 Outcome: Progressing as expected  Short Term Goal # 1: Pt will consume SB6/TN0 diet without S/S of aspiration.  Goal Outcome # 1: Progressing as expected    Manisha Murray, SLP

## 2023-07-04 NOTE — PROGRESS NOTES
Diabetes education: Met with pt this pm. Please see consult note.   Plan: Pt unclear on discharge plan. Per SW note, PFA to screen for coverage ( hopefully for Medicaid as well as she states she cannot afford medications). Not appropriate to attempt education on pens or meter at this time. CDE to follow up on Wednesday. If pt to dc home, she may need more help /practice prior to dc and assistance with dc medications/approved services.

## 2023-07-04 NOTE — PROGRESS NOTES
Monitor summary: SR, HR 76-93, WI .18, QRS .08, QT .37 with F PAC, R PVC per strip from monitor room

## 2023-07-04 NOTE — CARE PLAN
The patient is Stable - Low risk of patient condition declining or worsening    Shift Goals  Clinical Goals: No complications related to neuro status; Preventative skin interventions; PO food and fluids  Patient Goals: Sleep;  Family Goals: NICA    Progress made toward(s) clinical / shift goals:  Patient remains alert and oriented X 4. Neuro checks and NIHSS continued with no new complications. Preventative skin interventions continued. Patient asissted with ADLs, as needed.     Problem: Pain - Standard  Goal: Alleviation of pain or a reduction in pain to the patient’s comfort goal  Outcome: Progressing     Problem: Optimal Care of the Stroke Patient  Goal: Optimal emergency care for the stroke patient  Outcome: Progressing  Goal: Optimal acute care for the stroke patient  Outcome: Progressing     Problem: Knowledge Deficit - Stroke Education  Goal: Patient's knowledge of stroke and risk factors will improve  Outcome: Progressing     Problem: Psychosocial - Patient Condition  Goal: Patient's ability to verbalize feelings about condition will improve  Outcome: Progressing  Goal: Patient's ability to re-evaluate and adapt role responsibilities will improve  Outcome: Progressing     Problem: Discharge Planning - Stroke  Goal: Ensure Stroke Core Measures are met prior to discharge  Outcome: Progressing  Goal: Patient’s continuum of care needs will be met  Outcome: Progressing     Problem: Neuro Status  Goal: Neuro status will remain stable or improve  Outcome: Progressing     Problem: Hemodynamic Monitoring  Goal: Patient's hemodynamics, fluid balance and neurologic status will be stable or improve  Outcome: Progressing     Problem: Respiratory - Stroke Patient  Goal: Patient will achieve/maintain optimum respiratory rate/effort  Outcome: Progressing     Problem: Dysphagia  Goal: Dysphagia will improve  Outcome: Progressing     Problem: Risk for Aspiration  Goal: Patient's risk for aspiration will be absent or  decrease  Outcome: Progressing     Problem: Urinary Elimination  Goal: Establish and maintain regular urinary output  Outcome: Progressing     Problem: Bowel Elimination  Goal: Establish and maintain regular bowel function  Outcome: Progressing     Problem: Mobility - Stroke  Goal: Patient's capacity to carry out activities will improve  Outcome: Progressing  Goal: Spasticity will be prevented or improved  Outcome: Progressing  Goal: Subluxation will be prevented or improved  Outcome: Progressing     Problem: Self Care  Goal: Patient will have the ability to perform ADLs independently or with assistance (bathe, groom, dress, toilet and feed)  Outcome: Progressing     Problem: Knowledge Deficit - Standard  Goal: Patient and family/care givers will demonstrate understanding of plan of care, disease process/condition, diagnostic tests and medications  Outcome: Progressing       Patient is not progressing towards the following goals:

## 2023-07-04 NOTE — THERAPY
Occupational Therapy   Initial Evaluation     Patient Name: Nyasia Schuster  Age:  67 y.o., Sex:  female  Medical Record #: [id number]  Today's Date: 7/4/2023     Precautions  Precautions: Fall Risk  Comments: R sided weakness, R UE> R LE    Assessment  Patient is 67 y.o. female who presented to [facility] 7/2/2023 with dysarthria and right arm weakness & has a history of tobacco use and diabetes. Prior to admission, pt was able to complete ADLs & IADLs independently. PT reports that her son lives with her & sister lives next door; they can assist as needed. Pt was seated in chair upon arrival & was motivated for therapy. Pt completed functional mobility, transfers,  & STS with 2x HHA Viji; LB dress & toileting ModA. Pt then used a FWW for functional mobility w/ Viji.  Pt required verbal cues to attend to her RUE & for placements during task. Will recommend post-caute placement for additional therpay services prior to d/c home. .      Plan    Occupational Therapy Initial Treatment Plan   Treatment Interventions: Self Care / Activities of Daily Living, Adaptive Equipment, Neuro Re-Education / Balance, Therapeutic Exercises, Therapeutic Activity  Treatment Frequency: 3 Times per Week  Duration: Until Therapy Goals Met    DC Equipment Recommendations: Unable to determine at this time  Discharge Recommendations: Recommend post-acute placement for additional occupational therapy services prior to discharge home          Objective       07/04/23 0946   Prior Living Situation   Prior Services Home-Independent   Housing / Facility 2 Story House   Steps Into Home 5   Steps In Home 16   Rail Both Rail (Steps into Home);Left Rail (Steps in Home)   Elevator No   Bathroom Set up Bathtub / Shower Combination   Equipment Owned None   Lives with - Patient's Self Care Capacity Adult Children   Comments Pt reports her sister lives next door and can assit as needed.   Prior Level of ADL Function   Self Feeding Independent   Grooming /  Hygiene Independent   Bathing Independent   Dressing Independent   Toileting Independent   Prior Level of IADL Function   Medication Management Independent   Laundry Independent   Kitchen Mobility Independent   Finances Independent   Home Management Independent   Shopping Independent   Prior Level Of Mobility Independent Without Device in Community;Independent Without Device in Home   Driving / Transportation Driving Independent   Occupation (Pre-Hospital Vocational) Retired Due To Age   Leisure Interests Gardening   History of Falls   History of Falls No   Precautions   Precautions Fall Risk   Vitals   Patient BP Position Sitting   Pulse Oximetry 94 %   Room Air Oximetry 93   O2 (LPM) 2   O2 Delivery Device Nasal Cannula   Vitals Comments Pt does not use oxgyen at home   Pain   Pain Scales 0 to 10 Scale    Intervention Repositioned;Rest   Pain 0 - 10 Group   Therapist Pain Assessment Post Activity Pain Same as Prior to Activity;Nurse Notified;0   Cognition    Cognition / Consciousness WDL   Level of Consciousness Alert   Comments Pt was pleasant & cooperative   Passive ROM Upper Body   Passive ROM Upper Body WDL   Active ROM Upper Body   Active ROM Upper Body  X   Dominant Hand Right   Rt Shoulder Flexion Degrees 30   Comments R UE limited by strength   Strength Upper Body   Upper Body Strength  X   Rt Shoulder Flexion Strength 3- (F-)   Rt Elbow Flexion Strength 3+ (F+)   Rt Elbow Extension Strength 3 (F)   Rt Wrist Flexion Strength 2- (P-)   Rt Wrist Extension Strength 2- (P-)   Right  Impaired   Comments Pt LUE WFL .   Sensation Upper Body   Upper Extremity Sensation  X   Comments Pt reports numbness in her RUE   Upper Body Muscle Tone   Upper Body Muscle Tone  WDL   Neurological Concerns   Neurological Concerns Yes   Standing Posture During ADL's Lateral Lean Right;Posterior Pelvic Tilt   Rt Upper Extremity Gross Motor Control Impaired   Rt Upper Extremity Fine Motor Control Absent   Rt Upper Extremity  Functional Use Impaired   Right In Hand Manipulation Absent   Coordination Upper Body   Coordination X   Comments R hand gross motor is impaired & fine motor absent   Balance Assessment   Sitting Balance (Static) Fair   Sitting Balance (Dynamic) Fair -   Standing Balance (Static) Poor +   Standing Balance (Dynamic) Poor -   Weight Shift Sitting Fair   Weight Shift Standing Poor   Comments w/ 2x HHA  (\)   Bed Mobility    Scooting Standby Assist   Comments Pt was in chair upon arrival   ADL Assessment   Eating Independent   Grooming Minimal Assist;Standing   Upper Body Dressing Moderate Assist   Lower Body Dressing Moderate Assist   Toileting Moderate Assist   Comments Pt was able to don RLE sock with Viji & needed ModA to don LLE   How much help from another person does the patient currently need...   Putting on and taking off regular lower body clothing? 2   Bathing (including washing, rinsing, and drying)? 2   Toileting, which includes using a toilet, bedpan, or urinal? 2   Putting on and taking off regular upper body clothing? 2   Taking care of personal grooming such as brushing teeth? 2   Eating meals? 2   6 Clicks Daily Activity Score 12   mRS Prior to admission   Prior to admission mRS 0   Modified [place] (mRS)   Modified [place] Score 4   Functional Mobility   Sit to Stand Minimal Assist   Bed, Chair, Wheelchair Transfer Minimal Assist   Toilet Transfers Minimal Assist   Transfer Method Stand Step   Mobility functinal mobility   Comments w/2x HHA   Visual Perception   Visual Perception  WDL   Activity Tolerance   Sitting in Chair Pt was in chair upon arrival   Standing 10 min   Patient / Family Goals   Patient / Family Goal #1 to go home   Short Term Goals   Short Term Goal # 1 pt will complete LB dressing with Viji   Short Term Goal # 2 Pt will complete g/h standing at sink with supervision   Short Term Goal # 3 Pt will complete functional transfers safely with supervision   Education Group   Education  Provided Upper Extremity Range of Motion;Role of Occupational Therapist   Role of Occupational Therapist Patient Response Patient;Acceptance;Explanation;Verbal Demonstration   Upper Ext ROM Patient Response Patient;Acceptance;Explanation;Verbal Demonstration   Occupational Therapy Initial Treatment Plan    Treatment Interventions Self Care / Activities of Daily Living;Adaptive Equipment;Neuro Re-Education / Balance;Therapeutic Exercises;Therapeutic Activity   Treatment Frequency 3 Times per Week   Duration Until Therapy Goals Met   Problem List   Problem List Decreased Active Daily Living Skills;Decreased Homemaking Skills;Decreased Upper Extremity Strength Right;Decreased Upper Extremity AROM Right;Decreased Functional Mobility;Decreased Activity Tolerance;Safety Awareness Deficits / Cognition;Impaired Posture / Trunk Alignment;Impaired Postural Control / Balance   Interdisciplinary Plan of Care Collaboration   IDT Collaboration with  Nursing   Patient Position at End of Therapy Seated;Chair Alarm On;Call Light within Reach;Tray Table within Reach;Phone within Reach;Family / Friend in Room   Collaboration Comments RN updated

## 2023-07-04 NOTE — PROGRESS NOTES
Neurology Progress Note  Neurohospitalist Service, [facility]    Referring Physician: Rachelle Wilkerson D.O.      Interval History: No acute events overnight.  Admitted for R side weakness, not a candidate for acute stroke interventions given delayed presentation.  Exam stable. MRI pending.    Review of systems: In addition to what is detailed in the HPI and/or updated in the interval history, all other systems reviewed and are negative.    Past Medical History, Past Surgical History and Social History reviewed and unchanged from prior    Medications:    Current Facility-Administered Medications:     insulin GLARGINE (Lantus,Semglee) injection, 0.2 Units/kg/day, Subcutaneous, Q EVENING, 15 Units at 07/03/23 1718 **AND** insulin lispro (HumaLOG,AdmeLOG) injection, 2-9 Units, Subcutaneous, 4X/DAY ACHS, 2 Units at 07/03/23 1718 **AND** POC blood glucose manual result, , , Q AC AND BEDTIME(S) **AND** NOTIFY MD and PharmD, , , Once **AND** Administer 20 grams of glucose (approximately 8 ounces of fruit juice) every 15 minutes PRN FSBG less than 70 mg/dL, , , PRN **AND** dextrose 10 % BOLUS 25 g, 25 g, Intravenous, Q15 MIN PRN, Ainsley Mcfarland M.D.    lidocaine (Lidoderm) 5 % 2 Patch, 2 Patch, Transdermal, Daily-1400, Ainsley Mcfarland M.D.    clopidogrel (Plavix) tablet 75 mg, 75 mg, Oral, DAILY, Vega Santos M.D., 75 mg at 07/04/23 0509    senna-docusate (Pericolace Or Senokot S) 8.6-50 MG per tablet 2 Tablet, 2 Tablet, Oral, BID, 2 Tablet at 07/02/23 1822 **AND** polyethylene glycol/lytes (Miralax) PACKET 1 Packet, 1 Packet, Oral, QDAY PRN **AND** magnesium hydroxide (Milk Of Magnesia) suspension 30 mL, 30 mL, Oral, QDAY PRN **AND** bisacodyl (Dulcolax) suppository 10 mg, 10 mg, Rectal, QDAY PRN, Reji Angel D.O.    Respiratory Therapy Consult, , Nebulization, Continuous RT, Reji Angel D.O.    enoxaparin (Lovenox) inj 40 mg, 40 mg, Subcutaneous, DAILY AT 1800, Reji  NILSA Angel.OEaston, 40 mg at 07/03/23 1726    acetaminophen (Tylenol) tablet 650 mg, 650 mg, Oral, Q6HRS PRN, ROSALIND CallawayOEaston, 650 mg at 07/02/23 1326    ondansetron (Zofran) syringe/vial injection 4 mg, 4 mg, Intravenous, Q4HRS PRN, NILSA Callaway.O.    ondansetron (Zofran ODT) dispertab 4 mg, 4 mg, Oral, Q4HRS PRN, NILSA Callaway.O.    nicotine (Nicoderm) 21 MG/24HR 21 mg, 21 mg, Transdermal, Daily-0600, 21 mg at 07/04/23 0509 **AND** Nicotine Replacement Patient Education Materials, , , Once **AND** nicotine polacrilex (Nicorette) 2 MG piece 2 mg, 2 mg, Oral, Q HOUR PRN, ROSALIND CallawayO.    atorvastatin (Lipitor) tablet 40 mg, 40 mg, Oral, Q EVENING, ROSALIND CallawayOEaston, 40 mg at 07/03/23 1727    aspirin (Asa) chewable tab 81 mg, 81 mg, Oral, DAILY, 81 mg at 07/04/23 0508 **OR** [DISCONTINUED] aspirin (Asa) suppository 300 mg, 300 mg, Rectal, DAILY, ROSALIND CallawayO.    LORazepam (Ativan) injection 1 mg, 1 mg, Intravenous, Q6HRS PRN, NILSA Callaway.O.    oxyCODONE immediate-release (Roxicodone) tablet 5 mg, 5 mg, Oral, Q4HRS PRN **OR** oxyCODONE immediate release (Roxicodone) tablet 10 mg, 10 mg, Oral, Q4HRS PRN, ROSALIND CallawayO., 10 mg at 07/03/23 0020    Physical Examination:   BP (!) 151/91   Pulse 93   Temp 37.2 °C (99 °F) (Temporal)   Resp 18   Ht 1.524 m (5')   Wt 74.8 kg (165 lb)   SpO2 91%   BMI 32.22 kg/m²       General: Patient is awake and in no acute distress  Neck: There is normal range of motion  CV: Regular rate   Extremities:  Warm, dry, and intact, without peripheral lower extremity edema    NEUROLOGICAL EXAM:     Mental status: Awake, alert and fully oriented  Speech and language: Speech is mildly dysarthric but fluent. The patient is able to name and repeat, and follow commands  Cranial nerve exam: Visual fields are full. There is no nystagmus. Extraocular muscles are intact. Slight R facial  droop.  Motor exam: There is sustained antigravity with no downward drift in L arm and leg.  R arm with slight shrug, no antigravity strength.  R leg antigravity with slight drift  Sensory exam: Reacts to tactile in all 4 extremities, no neglect to double stim.  There is diminished sensation to R arm and face  Coordination: No ataxia on FTN testing on L  Gait: Deferred due to patient preference     NIHSS: National Institutes of Health Stroke Scale     [0] 1a:Level of Consciousness           0-alert 1-drowsy   2-stupor   3-coma  [0] 1b:LOC Questions                         0-both  1-one      2-neither  [0] 1c:LOC Commands                       0-both  1-one      2-neither  [0] 2: Best Gaze                      0-nl    1-partial  2-forced  [0] 3: Visual Fields                              0-nl    1-partial  2-complete 3-bilat  [1] 4: Facial Paresis                0-nl    1-minor    2-partial  3-full  MOTOR                                              0-nl  [3] 5: Right Arm                       1-drift  [0] 6: Left Arm                                     2-some effort vs gravity  [1] 7: Right Leg                       3-no effort vs gravity  [0] 8: Left Leg                                      4-no movement                                                              x-untestable  [0] 9: Limb Ataxia                    0-abs   1-1_limb   2-2+_limbs                                                              x-untestable  [1] 10:Sensory                        0-nl    1-partial  2-dense  [0] 11:Best Language/Aphasia         0-nl    1-mild/mod 2-severe   3-mute  [1] 12:Dysarthria                     0-nl    1-mild/mod 2-severe                                                              x-untestable  [0] 13:Neglect/Inattention                   0-none  1-partial  2-complete  [7] TOTAL      Objective Data:    Labs:  Lab Results   Component Value Date/Time    PROTHROMBTM 13.0 07/02/2023 07:30 AM    INR 0.99 07/02/2023  07:30 AM      Lab Results   Component Value Date/Time    WBC 9.5 07/02/2023 07:30 AM    RBC 5.33 07/02/2023 07:30 AM    HEMOGLOBIN 16.2 (H) 07/02/2023 07:30 AM    HEMATOCRIT 46.6 07/02/2023 07:30 AM    MCV 87.4 07/02/2023 07:30 AM    MCH 30.4 07/02/2023 07:30 AM    MCHC 34.8 07/02/2023 07:30 AM    MPV 11.3 07/02/2023 07:30 AM    NEUTSPOLYS 67.30 07/02/2023 07:30 AM    LYMPHOCYTES 21.10 (L) 07/02/2023 07:30 AM    MONOCYTES 7.50 07/02/2023 07:30 AM    EOSINOPHILS 3.10 07/02/2023 07:30 AM    BASOPHILS 0.60 07/02/2023 07:30 AM      Lab Results   Component Value Date/Time    SODIUM 139 07/03/2023 02:22 AM    POTASSIUM 4.0 07/03/2023 02:22 AM    CHLORIDE 103 07/03/2023 02:22 AM    CO2 27 07/03/2023 02:22 AM    GLUCOSE 161 (H) 07/03/2023 02:22 AM    BUN 5 (L) 07/03/2023 02:22 AM    CREATININE 0.46 (L) 07/03/2023 02:22 AM      Lab Results   Component Value Date/Time    CHOLSTRLTOT 139 07/03/2023 02:22 AM    LDL 90 07/03/2023 02:22 AM    HDL 32 (A) 07/03/2023 02:22 AM    TRIGLYCERIDE 87 07/03/2023 02:22 AM       Lab Results   Component Value Date/Time    ALKPHOSPHAT 84 07/02/2023 07:30 AM    ASTSGOT 15 07/02/2023 07:30 AM    ALTSGPT 13 07/02/2023 07:30 AM    TBILIRUBIN 0.5 07/02/2023 07:30 AM        Imaging/Testing:    I interpreted and/or reviewed the patient's neuroimaging    EC-ECHOCARDIOGRAM COMPLETE W/O CONT   Final Result      CT-CTA NECK WITH & W/O-POST PROCESSING   Final Result      CT angiogram of the neck within normal limits.      Emphysematous changes.      CT-CTA HEAD WITH & W/O-POST PROCESS   Final Result      1.  Moderate stenosis of the junction of the M1/M2 segment of the left middle cerebral artery.   2.  Probable proximal left PCA stenosis.   3.  No large vessel occlusion or aneurysm.      CT-HEAD W/O   Final Result      1.  Chronic microvascular ischemic type changes.   2.  No acute intracranial abnormality.         DX-CHEST-PORTABLE (1 VIEW)   Final Result         1.  No acute cardiopulmonary disease.    2.  Atherosclerosis      MR-BRAIN-W/O    (Results Pending)       Assessment and Plan:  Nyasia Schuster is a 67 year old woman presenting with R side weakness, R side sensory changes, mild dysarthria- well outside window for acute stroke interventions.  MRI is pending.  Clinical semiology most consistent with small vessel (lacunar) etiology- we have initiate short-term DAPT, high intensity statin, BP control, and smoking cessation for secondary stroke prevention.     Problem list:   Stroke  Hyperlipidemia  Type 2 diabetes  Smoker     Plan:              - q4h neurochecks/NIHSS              - MRI brain without contrast              - continue ASA 81mg daily for additional 10 days (stop date is 7/13)              - continue plavix 75mg daily indefinitely              - no need for acute hypertensive response at this time, long-term BP goal is 110-130/60-80; ok to start PO anti-HTN if needed              - stroke labs:  HgbA1c 12.6, LDL 90              - continue atorvastatin 40mg daily for goal LDL < 70              - DM management for goal HgbA1c < 7, acutely aim for FSBS               - TTE completed, no obvious embolic nidus, may defer ziopatch monitoring unless MRI reveals embolic stroke              - PT/OT/SLP              - continue lovenox SQ for DVT chemoppx              - smoking cessation counseling    The evaluation of the patient, and recommended management, was discussed with Dr. Wilkerson, attending hospitalist. I have performed a physical exam and reviewed and updated ROS and Plan today (7/4/2023).     Vega Santos MD  Neurohospitalist, Acute Care Services

## 2023-07-04 NOTE — CONSULTS
Diabetes education: Pt has a hs of diabetes, previously on Metformin but off for some time secondary to loss of insurance. Pt does currently have Medicare ( unclear if only A or A & B but definitely not part D), coverage.   Pt was admitted with right sided weakness ( dominant side), and blood sugar of 316 with Hga1c of 12.6%.  Pt is currently on Glargine 15 units pm with Lispro per sliding scale coverage ac and hs with blood sugars of  173 ( 2 units). Pt was able to inject her insulin with nursing using her left hand. Unclear how she will be able to do finger sticks.   Plan: Pt unclear on discharge plan. Per  note, PFA to screen for coverage ( hopefully for Medicaid as well as she states she cannot afford medications). Not appropriate to attempt education on pens or meter at this time. CDE to follow up on Wednesday. If pt to dc home, she may need more help /practice prior to dc and assistance with dc medications/approved services.

## 2023-07-04 NOTE — CONSULTS
Physical Medicine and Rehabilitation Consultation              Date of initial consultation: 7/4/2023  Requesting provider: ordered by Reji Angel D.O. at 07/04/23 1022  Consulting provider: Melina Roman D.O.  Reason for consultation: assess for acute inpatient rehab appropriateness  LOS: 2 Day(s)    Chief complaint: slurred speech and R sided weakness      HPI: The patient is a 67 y.o. female with a past medical history of DM (non compliant on metformin) and tobacco use;  who presented on 7/2/2023  7:18 AM with slurred speech and R sided weakness. Per documentation, patient had noticed a change in her speech about 2 days prior to presentation to ED. Patient's sister noted that the patient's speech sounded different about two days ago, when patient had new onset right sided weakness, they presented to the ED. Upon eval in the ED, CT head showed chronic microvascular ischemic type changes. CTA NECK negative for acute findings. CTA head showed moderate stenosis of the junction of M1/M2 segment of the L MCA  Neuro consulted,  Patient was started on ASA, plavix, and statin. long-term BP goal is 110-130/60-80. ECHO showed EF of 70%. MRI brain is pending.     Patient seen and examined at bedside. Report her R side is 'wimpy.' Just returned from MRI, is asking when she can leave. Discussed need for MRI results to come back. Does not report HA, lightheadedness, SOB, CP, abdominal pain, or changes in numbness/tingling/weakness. R sided weakness stable since admit. Reveiwed levels of rehab with patient, states she would prefer to go home but will need time to think it over. Is willing to call her sister to discuss rehab while waiting for MRI results. Also pending OT eval.       Social Hx:  Patient lives with adult children in a 2 story house with 5 MONTY and 15 inside Son can provide intermittent assistance.   5 MONTY  At prior level of function patient was Independent with mobility and ADLs.     Tobacco:  smokes 2 packs per day   Alcohol: Denies   Drugs: Denies     THERAPY:  Restrictions: Fall Risk,   PT: Functional mobility   7/3  Mod A with FWW x 3 ft , Min A sit to stand, Mod A transfers     OT: ADLs  Pending     SLP:   7/4 requires assistance for IADLs  7/3 soft and bite sized with thins     IMAGING:  CT-CTA NECK WITH & W/O-POST PROCESSING   Final Result       CT angiogram of the neck within normal limits.       Emphysematous changes.       CT-CTA HEAD WITH & W/O-POST PROCESS   Final Result       1.  Moderate stenosis of the junction of the M1/M2 segment of the left middle cerebral artery.   2.  Probable proximal left PCA stenosis.   3.  No large vessel occlusion or aneurysm.       CT-HEAD W/O   Final Result       1.  Chronic microvascular ischemic type changes.   2.  No acute intracranial abnormality.           DX-CHEST-PORTABLE (1 VIEW)   Final Result           1.  No acute cardiopulmonary disease.   2.  Atherosclerosis       PROCEDURES:  None     PMH:  History reviewed. No pertinent past medical history.    PSH:  History reviewed. No pertinent surgical history.    FHX:  History reviewed. No pertinent family history.    Medications:  Current Facility-Administered Medications   Medication Dose    insulin GLARGINE (Lantus,Semglee) injection  0.2 Units/kg/day    And    insulin lispro (HumaLOG,AdmeLOG) injection  2-9 Units    And    dextrose 10 % BOLUS 25 g  25 g    lidocaine (Lidoderm) 5 % 2 Patch  2 Patch    clopidogrel (Plavix) tablet 75 mg  75 mg    senna-docusate (Pericolace Or Senokot S) 8.6-50 MG per tablet 2 Tablet  2 Tablet    And    polyethylene glycol/lytes (Miralax) PACKET 1 Packet  1 Packet    And    magnesium hydroxide (Milk Of Magnesia) suspension 30 mL  30 mL    And    bisacodyl (Dulcolax) suppository 10 mg  10 mg    Respiratory Therapy Consult      enoxaparin (Lovenox) inj 40 mg  40 mg    acetaminophen (Tylenol) tablet 650 mg  650 mg    ondansetron (Zofran) syringe/vial injection 4 mg  4 mg     ondansetron (Zofran ODT) dispertab 4 mg  4 mg    nicotine (Nicoderm) 21 MG/24HR 21 mg  21 mg    And    nicotine polacrilex (Nicorette) 2 MG piece 2 mg  2 mg    atorvastatin (Lipitor) tablet 40 mg  40 mg    aspirin (Asa) chewable tab 81 mg  81 mg    LORazepam (Ativan) injection 1 mg  1 mg    oxyCODONE immediate-release (Roxicodone) tablet 5 mg  5 mg    Or    oxyCODONE immediate release (Roxicodone) tablet 10 mg  10 mg       Allergies:  Not on File    Physical Exam:  Vitals: BP (!) 151/91   Pulse 93   Temp 37.2 °C (99 °F) (Temporal)   Resp 18   Ht 1.524 m (5')   Wt 74.8 kg (165 lb)   SpO2 91%   Gen: NAD, seated comfortably in recliner chair   Head:  NC/AT  Eyes/ Nose/ Mouth: PERRLA, moist mucous membranes  Cardio: RRR, good distal perfusion, warm extremities  Pulm: normal respiratory effort, no cyanosis, on RA   Abd: Soft NTND, negative borborygmi   Ext: No peripheral edema. No calf tenderness. No clubbing.    Mental status:  A&Ox4 (person, place, date, situation) answers questions appropriately follows commands  Speech: fluent, no aphasia , + mild dysarthria     CRANIAL NERVES:  2,3: visual acuity grossly intact, PERRL  3,4,6: EOMI bilaterally, no nystagmus or diplopia  5: sensation intact to light touch bilaterally and symmetric  7: no facial asymmetry  8: hearing grossly intact    Motor:      Upper Extremity  Myotome R L   Shoulder flexion C5 1/5 5/5   Elbow flexion C5 2/5 5/5   Wrist extension C6 1/5 5/5   Elbow extension C7 2/5 5/5   Finger flexion C8 0/5 5/5   Finger abduction T1 0/5 5/5     Lower Extremity Myotome R L   Hip flexion L2 4/5 5/5   Knee extension L3 4/5 5/5   Ankle dorsiflexion L4 5/5 5/5   Toe extension L5 5/5 5/5   Ankle plantarflexion S1 5/5 5/5       Negative Pronator drift bilaterally    Sensory:   intact to light touch through out      DTRs: 1+ in bilateral  biceps  No clonus at bilateral ankles  Negative Al b/l     Tone: no spasticity noted, no cogwheeling noted    Coordination:    intact finger to nose LUE, unable to perform with RUE   intact fine motor with fingers with LUE, unable to perform with RUE       Labs: Reviewed and significant for   Recent Labs     07/02/23 0730   RBC 5.33   HEMOGLOBIN 16.2*   HEMATOCRIT 46.6   PLATELETCT 170   PROTHROMBTM 13.0   APTT 30.5   INR 0.99     Recent Labs     07/02/23  0730 07/03/23  0222   SODIUM 137 139   POTASSIUM 4.1 4.0   CHLORIDE 101 103   CO2 22 27   GLUCOSE 316* 161*   BUN 7* 5*   CREATININE 0.42* 0.46*   CALCIUM 9.3 9.1     Recent Results (from the past 24 hour(s))   EC-ECHOCARDIOGRAM COMPLETE W/O CONT    Collection Time: 07/03/23  4:30 PM   Result Value Ref Range    Eject.Frac. MOD BP 59.03     Eject.Frac. MOD 4C 66.24     Eject.Frac. MOD 2C 62.28     Left Ventrical Ejection Fraction 70    POCT glucose device results    Collection Time: 07/03/23  5:16 PM   Result Value Ref Range    POC Glucose, Blood 173 (H) 65 - 99 mg/dL   POCT glucose device results    Collection Time: 07/03/23  5:56 PM   Result Value Ref Range    POC Glucose, Blood 149 (H) 65 - 99 mg/dL   POCT glucose device results    Collection Time: 07/03/23  9:40 PM   Result Value Ref Range    POC Glucose, Blood 129 (H) 65 - 99 mg/dL   POCT glucose device results    Collection Time: 07/04/23  7:59 AM   Result Value Ref Range    POC Glucose, Blood 137 (H) 65 - 99 mg/dL         ASSESSMENT:  Patient is a 67 y.o. female admitted with  L MCA stroke     [facility] Code / Diagnosis to Support: 0001.2 - Stroke: Right Body Involvement (Left Brain)    Rehabilitation: Impaired ADLs and mobility  Patient is a good candidate for inpatient rehab based on needs for PT and SLP if patient agreeable to post acute rehab.     Barriers to transfer include: Insurance authorization, TCCs to verify disposition, medical clearance and bed availability     Additional Recommendations:  L MCA stroke   - presented with R sided weakness and slurred speech   - CT Head negative   -CTA neck  negative for LVO   -  CTA head  with evidence of M 1 /M2 stenosis   - Neuro consulted   - long teram goal  -130 / 60-80  - MRI brain pending   - ECHO showed EF 70%   - currently on ASA, plavix, statin   - continue with PT and SLP, OT pending     DM with hyperglycemia   - previously non compliant on metformin due to lack of insurance a few years ago (now has medicare0   - on SSI   - BG elevated to 246     Tobacco use   - currently daily smoker   - smoking cessation provided for > 3 mins  - stroke risk education provided     Dispo:  - patient is currently functioning below their level of baseline, recommend post acute rehab  - recommend IRF level therapy with 3hr of therapy 5 days per week if patient agreeable   - piror to acceptance to IRF, will need MRI completed   - TCC to assist with insurance auth and DC support         Medical Complexity:  L MCA stroke   DM   Tobacco use   Impaired mobility and ADLs       DVT PPX: Lovenox       Thank you for allowing us to participate in the care of this patient.     Patient was seen for 81 minutes on unit/floor of which > 50% of time was spent on counseling and coordination of care regarding the above, including prognosis, risk reduction, benefits of treatment, and options for next stage of care.    Melina Roman D.O.   Physical Medicine and Rehabilitation     Please note that this dictation was created using voice recognition software. I have made every reasonable attempt to correct obvious errors, but there may be errors of grammar and possibly content that I did not discover before finalizing the note.

## 2023-07-05 ENCOUNTER — NON-PROVIDER VISIT (OUTPATIENT)
Dept: CARDIOLOGY | Facility: MEDICAL CENTER | Age: 68
End: 2023-07-05
Payer: MEDICARE

## 2023-07-05 ENCOUNTER — HOSPITAL ENCOUNTER (INPATIENT)
Facility: REHABILITATION | Age: 68
LOS: 12 days | DRG: 056 | End: 2023-07-17
Attending: PHYSICAL MEDICINE & REHABILITATION | Admitting: PHYSICAL MEDICINE & REHABILITATION
Payer: MEDICARE

## 2023-07-05 VITALS
RESPIRATION RATE: 17 BRPM | OXYGEN SATURATION: 95 % | SYSTOLIC BLOOD PRESSURE: 120 MMHG | DIASTOLIC BLOOD PRESSURE: 70 MMHG | TEMPERATURE: 98.6 F | HEART RATE: 92 BPM | HEIGHT: 60 IN | BODY MASS INDEX: 32.39 KG/M2 | WEIGHT: 165 LBS

## 2023-07-05 DIAGNOSIS — I49.1 PREMATURE ATRIAL CONTRACTIONS: ICD-10-CM

## 2023-07-05 DIAGNOSIS — I63.9 CEREBROVASCULAR ACCIDENT (CVA), UNSPECIFIED MECHANISM (HCC): ICD-10-CM

## 2023-07-05 DIAGNOSIS — I49.3 PVCS (PREMATURE VENTRICULAR CONTRACTIONS): ICD-10-CM

## 2023-07-05 DIAGNOSIS — I63.9 ISCHEMIC STROKE (HCC): ICD-10-CM

## 2023-07-05 PROBLEM — B37.0 ORAL THRUSH: Status: ACTIVE | Noted: 2023-07-05

## 2023-07-05 PROBLEM — R05.3 CHRONIC COUGH: Status: ACTIVE | Noted: 2023-07-05

## 2023-07-05 PROBLEM — R33.9 URINARY RETENTION: Status: ACTIVE | Noted: 2023-07-05

## 2023-07-05 PROBLEM — J30.2 SEASONAL ALLERGIES: Status: ACTIVE | Noted: 2023-07-05

## 2023-07-05 PROBLEM — J96.01 ACUTE RESPIRATORY FAILURE WITH HYPOXIA (HCC): Status: ACTIVE | Noted: 2023-07-05

## 2023-07-05 LAB
FLUAV RNA SPEC QL NAA+PROBE: NEGATIVE
FLUBV RNA SPEC QL NAA+PROBE: NEGATIVE
GLUCOSE BLD STRIP.AUTO-MCNC: 133 MG/DL (ref 65–99)
GLUCOSE BLD STRIP.AUTO-MCNC: 159 MG/DL (ref 65–99)
GLUCOSE BLD STRIP.AUTO-MCNC: 231 MG/DL (ref 65–99)
GLUCOSE BLD STRIP.AUTO-MCNC: 233 MG/DL (ref 65–99)
RSV RNA SPEC QL NAA+PROBE: NEGATIVE
SARS-COV-2 RNA RESP QL NAA+PROBE: NOTDETECTED
SPECIMEN SOURCE: NORMAL

## 2023-07-05 PROCEDURE — A9270 NON-COVERED ITEM OR SERVICE: HCPCS | Performed by: HOSPITALIST

## 2023-07-05 PROCEDURE — 99239 HOSP IP/OBS DSCHRG MGMT >30: CPT | Performed by: INTERNAL MEDICINE

## 2023-07-05 PROCEDURE — 0241U HCHG SARS-COV-2 COVID-19 NFCT DS RESP RNA 4 TRGT MIC: CPT

## 2023-07-05 PROCEDURE — 82962 GLUCOSE BLOOD TEST: CPT

## 2023-07-05 PROCEDURE — 99223 1ST HOSP IP/OBS HIGH 75: CPT | Performed by: PHYSICAL MEDICINE & REHABILITATION

## 2023-07-05 PROCEDURE — 94760 N-INVAS EAR/PLS OXIMETRY 1: CPT

## 2023-07-05 PROCEDURE — 700102 HCHG RX REV CODE 250 W/ 637 OVERRIDE(OP): Performed by: PSYCHIATRY & NEUROLOGY

## 2023-07-05 PROCEDURE — A9270 NON-COVERED ITEM OR SERVICE: HCPCS | Performed by: PHYSICAL MEDICINE & REHABILITATION

## 2023-07-05 PROCEDURE — 99232 SBSQ HOSP IP/OBS MODERATE 35: CPT | Performed by: PSYCHIATRY & NEUROLOGY

## 2023-07-05 PROCEDURE — 700102 HCHG RX REV CODE 250 W/ 637 OVERRIDE(OP): Performed by: PHYSICAL MEDICINE & REHABILITATION

## 2023-07-05 PROCEDURE — 700111 HCHG RX REV CODE 636 W/ 250 OVERRIDE (IP): Mod: JZ | Performed by: PHYSICAL MEDICINE & REHABILITATION

## 2023-07-05 PROCEDURE — 82962 GLUCOSE BLOOD TEST: CPT | Mod: 91

## 2023-07-05 PROCEDURE — A9270 NON-COVERED ITEM OR SERVICE: HCPCS | Performed by: PSYCHIATRY & NEUROLOGY

## 2023-07-05 PROCEDURE — 770010 HCHG ROOM/CARE - REHAB SEMI PRIVAT*

## 2023-07-05 PROCEDURE — 700102 HCHG RX REV CODE 250 W/ 637 OVERRIDE(OP): Performed by: HOSPITALIST

## 2023-07-05 RX ORDER — LORATADINE 10 MG/1
10 TABLET ORAL DAILY
Status: DISCONTINUED | OUTPATIENT
Start: 2023-07-06 | End: 2023-07-17 | Stop reason: HOSPADM

## 2023-07-05 RX ORDER — POLYETHYLENE GLYCOL 3350 17 G/17G
1 POWDER, FOR SOLUTION ORAL
Status: DISCONTINUED | OUTPATIENT
Start: 2023-07-05 | End: 2023-07-15

## 2023-07-05 RX ORDER — INSULIN LISPRO 100 [IU]/ML
2-9 INJECTION, SOLUTION INTRAVENOUS; SUBCUTANEOUS
Status: DISCONTINUED | OUTPATIENT
Start: 2023-07-05 | End: 2023-07-07

## 2023-07-05 RX ORDER — AMOXICILLIN 250 MG
2 CAPSULE ORAL 2 TIMES DAILY
Status: DISCONTINUED | OUTPATIENT
Start: 2023-07-05 | End: 2023-07-15

## 2023-07-05 RX ORDER — LIDOCAINE 50 MG/G
2 PATCH TOPICAL DAILY
Status: CANCELLED | OUTPATIENT
Start: 2023-07-06

## 2023-07-05 RX ORDER — BISACODYL 10 MG
10 SUPPOSITORY, RECTAL RECTAL
Status: CANCELLED | OUTPATIENT
Start: 2023-07-05

## 2023-07-05 RX ORDER — CLOPIDOGREL BISULFATE 75 MG/1
75 TABLET ORAL DAILY
Status: DISCONTINUED | OUTPATIENT
Start: 2023-07-06 | End: 2023-07-17 | Stop reason: HOSPADM

## 2023-07-05 RX ORDER — DEXTROSE MONOHYDRATE 25 G/50ML
25 INJECTION, SOLUTION INTRAVENOUS
Status: DISCONTINUED | OUTPATIENT
Start: 2023-07-05 | End: 2023-07-07

## 2023-07-05 RX ORDER — ENOXAPARIN SODIUM 100 MG/ML
40 INJECTION SUBCUTANEOUS DAILY
Status: CANCELLED | OUTPATIENT
Start: 2023-07-05

## 2023-07-05 RX ORDER — ATORVASTATIN CALCIUM 40 MG/1
40 TABLET, FILM COATED ORAL EVERY EVENING
Status: DISCONTINUED | OUTPATIENT
Start: 2023-07-05 | End: 2023-07-17 | Stop reason: HOSPADM

## 2023-07-05 RX ORDER — HYDRALAZINE HYDROCHLORIDE 25 MG/1
25 TABLET, FILM COATED ORAL EVERY 8 HOURS PRN
Status: DISCONTINUED | OUTPATIENT
Start: 2023-07-05 | End: 2023-07-17 | Stop reason: HOSPADM

## 2023-07-05 RX ORDER — ATORVASTATIN CALCIUM 40 MG/1
40 TABLET, FILM COATED ORAL EVERY EVENING
Qty: 30 TABLET | Refills: 0 | Status: ON HOLD
Start: 2023-07-05 | End: 2023-07-16

## 2023-07-05 RX ORDER — ONDANSETRON 2 MG/ML
4 INJECTION INTRAMUSCULAR; INTRAVENOUS 4 TIMES DAILY PRN
Status: DISCONTINUED | OUTPATIENT
Start: 2023-07-05 | End: 2023-07-17 | Stop reason: HOSPADM

## 2023-07-05 RX ORDER — AMOXICILLIN 250 MG
2 CAPSULE ORAL 2 TIMES DAILY
Status: CANCELLED | OUTPATIENT
Start: 2023-07-05

## 2023-07-05 RX ORDER — NICOTINE 21 MG/24HR
21 PATCH, TRANSDERMAL 24 HOURS TRANSDERMAL
Status: DISCONTINUED | OUTPATIENT
Start: 2023-07-06 | End: 2023-07-17 | Stop reason: HOSPADM

## 2023-07-05 RX ORDER — NICOTINE 21 MG/24HR
1 PATCH, TRANSDERMAL 24 HOURS TRANSDERMAL EVERY 24 HOURS
Qty: 30 PATCH | Refills: 0 | Status: ON HOLD
Start: 2023-07-05 | End: 2023-07-16

## 2023-07-05 RX ORDER — LANOLIN ALCOHOL/MO/W.PET/CERES
3 CREAM (GRAM) TOPICAL NIGHTLY PRN
Status: DISCONTINUED | OUTPATIENT
Start: 2023-07-05 | End: 2023-07-17 | Stop reason: HOSPADM

## 2023-07-05 RX ORDER — ASPIRIN 81 MG/1
81 TABLET, CHEWABLE ORAL DAILY
Status: CANCELLED | OUTPATIENT
Start: 2023-07-06

## 2023-07-05 RX ORDER — ALUMINA, MAGNESIA, AND SIMETHICONE 2400; 2400; 240 MG/30ML; MG/30ML; MG/30ML
20 SUSPENSION ORAL
Status: DISCONTINUED | OUTPATIENT
Start: 2023-07-05 | End: 2023-07-17 | Stop reason: HOSPADM

## 2023-07-05 RX ORDER — CARBOXYMETHYLCELLULOSE SODIUM 5 MG/ML
1 SOLUTION/ DROPS OPHTHALMIC PRN
Status: DISCONTINUED | OUTPATIENT
Start: 2023-07-05 | End: 2023-07-17 | Stop reason: HOSPADM

## 2023-07-05 RX ORDER — CLOPIDOGREL BISULFATE 75 MG/1
75 TABLET ORAL DAILY
Qty: 30 TABLET | Status: ON HOLD
Start: 2023-07-06 | End: 2023-07-16 | Stop reason: SDUPTHER

## 2023-07-05 RX ORDER — ASPIRIN 81 MG/1
81 TABLET, CHEWABLE ORAL DAILY
Status: DISCONTINUED | OUTPATIENT
Start: 2023-07-06 | End: 2023-07-06

## 2023-07-05 RX ORDER — HYDRALAZINE HYDROCHLORIDE 10 MG/1
10 TABLET, FILM COATED ORAL EVERY 8 HOURS PRN
Status: DISCONTINUED | OUTPATIENT
Start: 2023-07-05 | End: 2023-07-17 | Stop reason: HOSPADM

## 2023-07-05 RX ORDER — CLOPIDOGREL BISULFATE 75 MG/1
75 TABLET ORAL DAILY
Status: CANCELLED | OUTPATIENT
Start: 2023-07-06

## 2023-07-05 RX ORDER — LACTULOSE 20 G/30ML
30 SOLUTION ORAL
Status: DISCONTINUED | OUTPATIENT
Start: 2023-07-05 | End: 2023-07-17 | Stop reason: HOSPADM

## 2023-07-05 RX ORDER — POLYETHYLENE GLYCOL 3350 17 G/17G
1 POWDER, FOR SOLUTION ORAL
Status: CANCELLED | OUTPATIENT
Start: 2023-07-05

## 2023-07-05 RX ORDER — MIDAZOLAM HYDROCHLORIDE 5 MG/ML
5 INJECTION INTRAMUSCULAR; INTRAVENOUS PRN
Status: DISCONTINUED | OUTPATIENT
Start: 2023-07-05 | End: 2023-07-17 | Stop reason: HOSPADM

## 2023-07-05 RX ORDER — BISACODYL 10 MG
10 SUPPOSITORY, RECTAL RECTAL
Status: DISCONTINUED | OUTPATIENT
Start: 2023-07-05 | End: 2023-07-15

## 2023-07-05 RX ORDER — NICOTINE 21 MG/24HR
21 PATCH, TRANSDERMAL 24 HOURS TRANSDERMAL
Status: CANCELLED | OUTPATIENT
Start: 2023-07-06

## 2023-07-05 RX ORDER — INSULIN LISPRO 100 [IU]/ML
INJECTION, SOLUTION INTRAVENOUS; SUBCUTANEOUS
Qty: 30 ML | Refills: 0 | Status: ON HOLD | DISCHARGE
Start: 2023-07-05 | End: 2023-07-16

## 2023-07-05 RX ORDER — LIDOCAINE 50 MG/G
2 PATCH TOPICAL DAILY
Status: DISCONTINUED | OUTPATIENT
Start: 2023-07-06 | End: 2023-07-12

## 2023-07-05 RX ORDER — ASPIRIN 81 MG/1
81 TABLET ORAL DAILY
Qty: 8 TABLET | Refills: 0 | Status: ON HOLD
Start: 2023-07-05 | End: 2023-07-16

## 2023-07-05 RX ORDER — ENOXAPARIN SODIUM 100 MG/ML
40 INJECTION SUBCUTANEOUS DAILY
Status: DISCONTINUED | OUTPATIENT
Start: 2023-07-05 | End: 2023-07-14

## 2023-07-05 RX ORDER — INSULIN LISPRO 100 [IU]/ML
2-9 INJECTION, SOLUTION INTRAVENOUS; SUBCUTANEOUS
Status: CANCELLED | OUTPATIENT
Start: 2023-07-05

## 2023-07-05 RX ORDER — ACETAMINOPHEN 325 MG/1
650 TABLET ORAL EVERY 6 HOURS PRN
Qty: 30 TABLET | Refills: 0 | Status: ON HOLD
Start: 2023-07-05 | End: 2023-07-16

## 2023-07-05 RX ORDER — ACETAMINOPHEN 325 MG/1
650 TABLET ORAL EVERY 4 HOURS PRN
Status: DISCONTINUED | OUTPATIENT
Start: 2023-07-05 | End: 2023-07-17 | Stop reason: HOSPADM

## 2023-07-05 RX ORDER — SIMETHICONE 125 MG
125 TABLET,CHEWABLE ORAL
Status: DISCONTINUED | OUTPATIENT
Start: 2023-07-05 | End: 2023-07-12

## 2023-07-05 RX ORDER — TRAZODONE HYDROCHLORIDE 50 MG/1
50 TABLET ORAL
Status: DISCONTINUED | OUTPATIENT
Start: 2023-07-05 | End: 2023-07-17 | Stop reason: HOSPADM

## 2023-07-05 RX ORDER — ECHINACEA PURPUREA EXTRACT 125 MG
2 TABLET ORAL PRN
Status: DISCONTINUED | OUTPATIENT
Start: 2023-07-05 | End: 2023-07-17 | Stop reason: HOSPADM

## 2023-07-05 RX ORDER — HYDROXYZINE HYDROCHLORIDE 25 MG/1
50 TABLET, FILM COATED ORAL EVERY 6 HOURS PRN
Status: DISCONTINUED | OUTPATIENT
Start: 2023-07-05 | End: 2023-07-17 | Stop reason: HOSPADM

## 2023-07-05 RX ORDER — ONDANSETRON 4 MG/1
4 TABLET, ORALLY DISINTEGRATING ORAL 4 TIMES DAILY PRN
Status: DISCONTINUED | OUTPATIENT
Start: 2023-07-05 | End: 2023-07-17 | Stop reason: HOSPADM

## 2023-07-05 RX ORDER — ATORVASTATIN CALCIUM 40 MG/1
40 TABLET, FILM COATED ORAL EVERY EVENING
Status: CANCELLED | OUTPATIENT
Start: 2023-07-05

## 2023-07-05 RX ADMIN — ATORVASTATIN CALCIUM 40 MG: 40 TABLET, FILM COATED ORAL at 20:38

## 2023-07-05 RX ADMIN — ASPIRIN 81 MG 81 MG: 81 TABLET ORAL at 04:06

## 2023-07-05 RX ADMIN — NICOTINE TRANSDERMAL SYSTEM 21 MG: 21 PATCH, EXTENDED RELEASE TRANSDERMAL at 04:07

## 2023-07-05 RX ADMIN — INSULIN LISPRO 3 UNITS: 100 INJECTION, SOLUTION INTRAVENOUS; SUBCUTANEOUS at 20:43

## 2023-07-05 RX ADMIN — ACETAMINOPHEN 650 MG: 325 TABLET, FILM COATED ORAL at 04:07

## 2023-07-05 RX ADMIN — INSULIN GLARGINE-YFGN 15 UNITS: 100 INJECTION, SOLUTION SUBCUTANEOUS at 20:42

## 2023-07-05 RX ADMIN — ACETAMINOPHEN 650 MG: 325 TABLET ORAL at 20:42

## 2023-07-05 RX ADMIN — ENOXAPARIN SODIUM 40 MG: 100 INJECTION SUBCUTANEOUS at 17:40

## 2023-07-05 RX ADMIN — NYSTATIN 500000 UNITS: 100000 SUSPENSION ORAL at 20:41

## 2023-07-05 RX ADMIN — CLOPIDOGREL BISULFATE 75 MG: 75 TABLET ORAL at 04:05

## 2023-07-05 RX ADMIN — NYSTATIN 500000 UNITS: 100000 SUSPENSION ORAL at 17:40

## 2023-07-05 RX ADMIN — INSULIN LISPRO 2 UNITS: 100 INJECTION, SOLUTION INTRAVENOUS; SUBCUTANEOUS at 17:48

## 2023-07-05 RX ADMIN — SENNOSIDES AND DOCUSATE SODIUM 2 TABLET: 50; 8.6 TABLET ORAL at 04:07

## 2023-07-05 RX ADMIN — INSULIN LISPRO 3 UNITS: 100 INJECTION, SOLUTION INTRAVENOUS; SUBCUTANEOUS at 13:00

## 2023-07-05 RX ADMIN — SIMETHICONE 125 MG: 125 TABLET, CHEWABLE ORAL at 17:40

## 2023-07-05 ASSESSMENT — LIFESTYLE VARIABLES
HOW MANY TIMES IN THE PAST YEAR HAVE YOU HAD 5 OR MORE DRINKS IN A DAY: 0
HAVE YOU EVER FELT YOU SHOULD CUT DOWN ON YOUR DRINKING: NO
HAVE PEOPLE ANNOYED YOU BY CRITICIZING YOUR DRINKING: NO
EVER FELT BAD OR GUILTY ABOUT YOUR DRINKING: NO
CONSUMPTION TOTAL: NEGATIVE
EVER HAD A DRINK FIRST THING IN THE MORNING TO STEADY YOUR NERVES TO GET RID OF A HANGOVER: NO
TOTAL SCORE: 0
TOTAL SCORE: 0
ALCOHOL_USE: NO
TOTAL SCORE: 0
EVER_SMOKED: YES
ON A TYPICAL DAY WHEN YOU DRINK ALCOHOL HOW MANY DRINKS DO YOU HAVE: 0
AVERAGE NUMBER OF DAYS PER WEEK YOU HAVE A DRINK CONTAINING ALCOHOL: 0

## 2023-07-05 ASSESSMENT — PAIN DESCRIPTION - PAIN TYPE
TYPE: ACUTE PAIN
TYPE: ACUTE PAIN

## 2023-07-05 ASSESSMENT — PATIENT HEALTH QUESTIONNAIRE - PHQ9
SUM OF ALL RESPONSES TO PHQ9 QUESTIONS 1 AND 2: 0
2. FEELING DOWN, DEPRESSED, IRRITABLE, OR HOPELESS: NOT AT ALL
1. LITTLE INTEREST OR PLEASURE IN DOING THINGS: NOT AT ALL

## 2023-07-05 ASSESSMENT — FIBROSIS 4 INDEX: FIB4 SCORE: 1.664100588675687366

## 2023-07-05 NOTE — PROGRESS NOTES
Monitor summary: SR-ST, HR , WA .16, QRS .05, QT .34 with O PAC, R PVC per strip from monitor room

## 2023-07-05 NOTE — PROGRESS NOTES
Diabetes education: Pt has no Medicare part D for medications. Pt may need assistance with approved services at discharge from [facility] ( transferring today).   Pt will need assistance/education on using insulin pens ( the easiest) or vials and syringes once pt ready for dc from rehab.   If not yet done, pt would benefit in having PFA eval for medicaid qualifications/application.

## 2023-07-05 NOTE — PROGRESS NOTES
Monitor summary: ST/SR, HR , AL 0.16, QRS 0.07, QT 0.35 with (F)PAC & (R)PVC per strip from monitor room

## 2023-07-05 NOTE — FLOWSHEET NOTE
07/05/23 1240   Events/Summary/Plan   Events/Summary/Plan RT Consult   Vital Signs   Pulse 88   Respiration 20   Pulse Oximetry 91 %   $ Pulse Oximetry (Spot Check) Yes   Respiratory Assessment   Level of Consciousness Alert   Chest Exam   Work Of Breathing / Effort Within Normal Limits   Breath Sounds   RUL Breath Sounds Diminished   RML Breath Sounds Diminished   RLL Breath Sounds Diminished   OPAL Breath Sounds Diminished   LLL Breath Sounds Diminished   Secretions   Cough Congested;Non Productive   Oxygen   O2 (LPM) 2   O2 Delivery Device Silicone Nasal Cannula   Smoking History   Have you ever smoked Yes   Have you smoked in the last 12 months Yes   Have you quit smoking No

## 2023-07-05 NOTE — H&P
"REHABILITATION HISTORY AND PHYSICAL/POST ADMISSION EVALUATION    7/5/2023  2:32 PM  Nyasia Schuster  RH07/[id number]  Admission  7/5/2023 12:28 PM  [facility] Code/Reason for admission: 0001.2 - Stroke: Right Body Involvement (Left Brain)   Etiologic diagnosis/problem: Ischemic stroke (HCC)  Chief Complaint: Right-sided weakness    HPI:  Per Dr. Roman's consult, \"The patient is a 67 y.o. female with a past medical history of DM (non compliant on metformin) and tobacco use;  who presented on 7/2/2023  7:18 AM with slurred speech and R sided weakness. Per documentation, patient had noticed a change in her speech about 2 days prior to presentation to ED. Patient's sister noted that the patient's speech sounded different about two days ago, when patient had new onset right sided weakness, they presented to the ED. Upon eval in the ED, CT head showed chronic microvascular ischemic type changes. CTA NECK negative for acute findings. CTA head showed moderate stenosis of the junction of M1/M2 segment of the L MCA  Neuro consulted,  Patient was started on ASA, plavix, and statin. long-term BP goal is 110-130/60-80. ECHO showed EF of 70%. MRI brain is pending.\"    Since initial consult, MRI completed which showed left MCA stroke. HgbA1c 12.6, LDL 90, ECHO EF 75 %.    Patient currently reports right-sided weakness, mostly in her arm and hand.  She is right-hand dominant.  She denies any changes in her vision, no numbness or tingling, denies any pain.  She reports a chronic cough as well as postnasal drip and nasal congestion.  She is reporting gas which she thinks is associated with stool softeners/laxatives.  For some mild urinary retention with a postvoid residual this afternoon of 140 cc. Patient was not on oxygen at home.     Patient was evaluated by Rehab Medicine physician and Physical Therapy, Occupational Therapy, and Speech Therapy and determined to be appropriate for acute inpatient rehab and was transferred to [facility] on 2023 12:28 PM.    With this acute therapeutic intervention, this patient hopes to improve her functional status, and return to independent living with the supportive care of family.    REVIEW OF SYSTEMS:     A complete review of systems was performed and was negative in detail with the exception of items mentioned elsewhere in this document.    PMH:  Past Medical History:   Diagnosis Date    Carpal tunnel syndrome on both sides     Diabetes (HCC)     Finger injury     3 yr old    Sinusitis     Tobacco abuse        PSH:  Past Surgical History:   Procedure Laterality Date    CARPAL TUNNEL RELEASE Bilateral     OTHER SURGICAL PROCEDURE      nasal septum repair    SINUSCOPY         Family History   Problem Relation Age of Onset    Parkinson's Disease Mother     Dementia Mother     Heart Disease Father     Prostate cancer Brother         MEDICATIONS:  Scheduled Medications   Medication Dose Frequency    Pharmacy Consult Request  1 Each PHARMACY TO DOSE    [START ON 2023] aspirin  81 mg DAILY    atorvastatin  40 mg Q EVENING    [START ON 2023] clopidogrel  75 mg DAILY    enoxaparin (LOVENOX) injection  40 mg DAILY AT 1800    insulin GLARGINE  0.2 Units/kg/day Q EVENING    And    insulin lispro  2-9 Units 4X/DAY ACHS    [START ON 2023] lidocaine  2 Patch DAILY    [START ON 2023] nicotine  21 mg Daily-0600    senna-docusate  2 Tablet BID       ALLERGIES:  No known drug allergies.     PSYCHOSOCIAL HISTORY:  Pre-mobidly, the patient lived in a two level home with 1 steps to enter, in [place] with her son who can assist intermittently. First floor set-up available. Sister lives next door and can assist at discharge.    Patient never , has 2 grown children, a son and daughter, as well as 3 grandchildren. She moved up here a few years ago to help her sister with their mother, who is no .    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     She smokes 2 ppd and is not sure she wants to quit. She denies alcohol or drugs.     LEVEL OF FUNCTION PRIOR TO DISABILTY:  Independent     LEVEL OF FUNCTION PRIOR TO ADMISSION to [facility]:  PT 7/3:    Strength Lower Body   Lower Body Strength  X   Comments presents with 4+/5 strength on R LE and 5/5 for L LE, functional for standing and a few steps   Sensation Lower Body   Lower Extremity Sensation   WDL   Lower Body Muscle Tone   Lower Body Muscle Tone  WDL   Neurological Concerns   Neurological Concerns Yes   Comments given R sided weakness   Coordination Upper Body   Coordination Not Tested   Coordination Lower Body    Coordination Lower Body  X   Comments presents with slightly impaired heel to shin with R LE, poor JELENA upon standing demonstrates with poor foot placement with R LE during steps forward and back   Vision   Vision Comments did not test   Balance Assessment   Sitting Balance (Static) Fair +   Sitting Balance (Dynamic) Fair   Standing Balance (Static) Fair -   Standing Balance (Dynamic) Poor +   Weight Shift Sitting Fair   Weight Shift Standing Poor   Comments w/fww use for standing with L UE and support of R UE from therapist   Bed Mobility    Comments found up in chair   Gait Analysis   Gait Level Of Assist Moderate Assist   Assistive Device Front Wheel Walker   Distance (Feet) 3   # of Times Distance was Traveled 1   Deviation Ataxic;Step To;Decreased Base Of Support;Shuffled Gait;Decreased Heel Strike;Decreased Toe Off   Weight Bearing Status fwb   Comments significant R sided leaning during attempts to ambulate   Functional Mobility   Sit to Stand Minimal Assist   Bed, Chair, Wheelchair Transfer Moderate Assist   Transfer Method Stand Step   Mobility sit<>stand, ambulation forward and back, transfer back to chair       OT 7/4:    Strength Upper Body   Upper Body Strength  X   Rt Shoulder Flexion Strength 3- (F-)   Rt Elbow  Flexion Strength 3+ (F+)   Rt Elbow Extension Strength 3 (F)   Rt Wrist Flexion Strength 2- (P-)   Rt Wrist Extension Strength 2- (P-)   Right  Impaired   Comments Pt LUE WFL .   Sensation Upper Body   Upper Extremity Sensation  X   Comments Pt reports numbness in her RUE   Upper Body Muscle Tone   Upper Body Muscle Tone  WDL   Neurological Concerns   Neurological Concerns Yes   Standing Posture During ADL's Lateral Lean Right;Posterior Pelvic Tilt   Rt Upper Extremity Gross Motor Control Impaired   Rt Upper Extremity Fine Motor Control Absent   Rt Upper Extremity Functional Use Impaired   Right In Hand Manipulation Absent   Coordination Upper Body   Coordination X   Comments R hand gross motor is impaired & fine motor absent   Balance Assessment   Sitting Balance (Static) Fair   Sitting Balance (Dynamic) Fair -   Standing Balance (Static) Poor +   Standing Balance (Dynamic) Poor -   Weight Shift Sitting Fair   Weight Shift Standing Poor   Comments w/ 2x HHA  (\)   Bed Mobility    Scooting Standby Assist   Comments Pt was in chair upon arrival   ADL Assessment   Eating Independent   Grooming Minimal Assist;Standing   Upper Body Dressing Moderate Assist   Lower Body Dressing Moderate Assist   Toileting Moderate Assist   Comments Pt was able to don RLE sock with [place] & needed ModA to don LLE       SLP 7/4:    Assessment  Standardized and non-standardized assessment measures were utilized to complete today's evaluation.      Cognistat  The Cognistat is multidimensional screening measure of cognition designed to primarily diagnose cognitive impairments. Scores were yielded in the following domains: orientation, attention, language (repetition, naming subscales), verbal memory, calculation, and reasoning (similarities and judgments subtests). Pt’s results are as follows:     Orientation: Average (12)  Attention: Average (8)  Comprehension: Average (6)  Repetition: Average (12)  Naming: Average (8)  Memory: Average  (11)  Calculations: Average (12)  Similarities: Average (8)  Judgement: Average (6)     *Not all brain lesions produce cognitive deficits that will be detected by cognitstat. Average Range scores, therefore, cannot be taken as evidence that brain pathology does not exist. Similarly, scores falling in the Mild, Moderate, or Severe range of impairment do not necessarily reflect brain dysfunction.      Informal Measures   Informal measures to assess auditory comprehension were utilized during this evaluation including verbal presentation of one, two, and three step directions as well as simple/moderate/complex level of yes/no questions. Pt followed one step directions with 100% accuracy, two-step directions with 100% accuracy, and three-step directions with 100% accuracy. Pt was able to answer simple/moderate/complex yes/no questions with 100% accuracy.      Medication Management   Informal measures including a medication management task were utilized to assess the pt’s ability to participate in functional iADL activities. Pt demonstrated appropriate following of directions, problem solving, reasoning, organization, and task cohesion.      Clock Drawing   The clock drawing test (CDT) is a screening to in which the patient is asked to draw a clock. Placement of the numbers around the [place] requires visual-spatial, numerical sequencing, and planning abilities. The patient is then asked to the draw the hands on the clock to indicate “ten minutes past 11 o’clock” additionally assessing auditory processing, memory, motor programming, attention, planning, and executive skills. The pt’s clock drawing was assessed utilizing the Cognitive Linguistic Quick Test (CLQT) evaluation measure. The Pt demonstrated: appropriate numerical spacing/orientation, arrangement, placement of hands, time indication, and different hand lengths.       Overall Score: 13/13      It is important to note that the pt is R handed at baseline; Therefore  legibility was reduced during the clock-drawing task as it was completed with their L hand.         Clinical Impressions  The patient demonstrates cognitive linguistic communication abilities that are WNL;    CURRENT LEVEL OF FUNCTION:   Same as level of function prior to admission to [facility]    PHYSICAL EXAM:     VITAL SIGNS:   height is 1.524 m (5') and weight is 79.8 kg (176 lb). Her oral temperature is 36.6 °C (97.8 °F). Her blood pressure is 144/82 (abnormal) and her pulse is 88. Her respiration is 20 and oxygen saturation is 91%.     GENERAL: No apparent distress  HEENT: Normocephalic/atraumatic, EOMI, PERRL, and No nystagmus, yellow plaque on tongue  CARDIAC: Regular rate and rhythm, normal S1, S2, no murmurs, no peripheral edema   LUNGS: Loose productive cough, normal respiratory effort, on 3 L O2, no wheezing  ABDOMINAL: soft, nontender, and nondistended , hyperactive bowel sounds  EXTREMITIES: no edema or 2+ bilateral DP/PT pulses  MSK: Missing distal phalanx on right index finger    NEURO:    Mental status: Alert  Speech: fluent, no aphasia or dysarthria    CRANIAL NERVES:  2,3: visual acuity grossly intact, PERRL  3,4,6: EOMI bilaterally, no nystagmus or diplopia  5: intact in all branches  7: no facial asymmetry  8: hearing grossly intact  9,10: symmetric palate elevation  11: SCM/Trapezius strength 3/5 right, 5/5 left  12: tongue protrudes midline    Motor:  Shoulder flexors:  Right -  2/5, Left -  5/5  Elbow flexors:  Right -  2-3/5, Left -  5/5  Elbow extensors:  Right -  2-3/5, Left -  5/5  No flexion or extension in the right fingers  Hip flexors:  Right -  4/5, Left -  5/5  Knee ext:  Right -  4/5, Left -  5/5  Dorsiflexors:  Right -  4/5, Left -  5/5  EHL:  Right -  4/5, Left - 5/5  Plantar flexors:  Right -  4/5, Left -  5/5     Sensory:   intact to light touch through out    RADIOLOGY:    Imaging personally reviewed and interpreted by me.    EXAMINATION:  7/4/2023 12:29  PM     HISTORY/REASON FOR EXAM:  Slurred speech     TECHNIQUE/EXAM DESCRIPTION:  MRI of the brain without and with contrast.     T1 sagittal, T2 fast spin-echo axial, T1 coronal, FLAIR coronal, Diffusion weighted and Apparent Diffusion Coefficient (ADC map) axial images were obtained of the whole brain. T1 post-contrast axial and T1 post-contrast coronal images were obtained.     The study was performed on a GE 1.5 Shwetha MRI scanner.     COMPARISON:   None     FINDINGS:     There is irregular ovoid and multiple punctate areas of increased T2 and diffusion-weighted signal intensity in the left posterior frontal corona radiata and extending into the left posterior frontal gyri.     The calvariae are normal.  There are no extra-axial fluid collections.     The ventricular system is mild to moderately prominent. There is mild-to-moderate prominence of the cortical sulci and gyri.  There are multiple scattered punctate and ovoid areas of increased T2 signal intensity in the periventricular white matter.    Patchy areas of increased T2 signal intensity in the remi. There are no mass effects or shift of midline structures.     The diffusion weighted images are unremarkable.     There are no hemorrhagic lesions.  The brainstem and posterior fossa structures are unremarkable.     Vascular flow voids in the carotid and vertebrobasilar arteries, [place], and dural venous sinuses are intact.     The paranasal sinuses and mastoid air cells are free of mucosal inflammatory change.        IMPRESSION:        1. Age-related cerebral atrophy.     2. Mild to moderate periventricular and pontine white matter changes consistent with chronic microvascular ischemic gliosis or demyelination.     3. Multifocal areas of infarction in the left posterior frontal centrum semiovale and involving several left posterior frontal gyri.        LABS:  Recent Labs     07/03/23  0222   SODIUM 139   POTASSIUM 4.0   CHLORIDE 103   CO2 27    GLUCOSE 161*   BUN 5*   CREATININE 0.46*   CALCIUM 9.1       PRIMARY REHAB DIAGNOSIS:    This patient is a 67 y.o. female admitted for acute inpatient rehabilitation with Ischemic stroke (HCC).    IMPAIRMENTS:   ADLs/IADLs  Mobility  Speech  Swallow    SECONDARY DIAGNOSIS/MEDICAL CO-MORBIDITIES AFFECTING FUNCTION:    Diabetes with hyperglycemia  Tobacco abuse  Oral thrush  Seasonal allergies  Respiratory failure with hypoxia  Abdominal gas      RELEVANT CHANGES SINCE PREADMISSION EVALUATION:    Status unchanged    The patient's rehabilitation potential is Excellent  The patient's medical prognosis is good    PLAN:   Discussion and Recommendations, discussed with the patient and/or family:   1. The patient requires an acute inpatient rehabilitation program with a coordinated program of care at an intensity and frequency not available at a lower level of care. This recommendation is substantiated by the patient's medical physicians who recommend that the patient's intervention and assessment of medical issues needs to be done at an acute level of care for patient's safety and maximum outcome.     2. A coordinated program of care will be supplied by an interdisciplinary team of physical therapy, occupational therapy, rehab physician, rehab nursing, and, if needed, speech therapy and rehab psychology. Rehab team presents a patient-specific rehabilitation and education program concentrating on prevention of future problems related to accessibility, mobility, skin, bowel, bladder, sexuality, and psychosocial and medical/surgical problems.     3. Need for Rehabilitation Physician: The rehab physician will be evaluating the patient on a multi-weekly basis to help coordinate the program of care. The rehab physician communicates between medical physicians, therapists, and nurses to maximize the patient's potential outcome. Specific areas in which the rehab physician will be providing daily assessment include the following:    A. Assessing the patient's heart rate and blood pressure response (vitals monitoring) to activity and making adjustments in medications or conservative measures as needed.   B. The rehab physician will be assessing the frequency at which the program can be increased to allow the patient to reach optimal functional outcome.   C. The rehab physician will also provide assessments in daily skin care, especially in light of patient's impairments in mobility.   D. The rehab physician will provide special expertise in understanding how to work with functional impairment and recommend appropriate interventions, compensatory techniques, and education that will facilitate the patient's outcome.     4. Rehab R.N.   The rehab RN will be working with patient to carry over in room mobility and activities of daily living when the patient is not in 3 hours of skilled therapy. Rehab nursing will be working in conjunction with rehab physician to address all the medical issues above and continue to assess laboratory work and discuss abnormalities with the treating physicians, assess vitals, and response to activity, and discuss and report abnormalities with the rehab physician. Rehab RN will also continue daily skin care, supervise bladder/bowel program, instruct in medication administration, and ensure patient safety.     5. Therapies to treat at intensity and frequency of (may change after completion of evaluation by all therapeutic disciplines):       PT:  Physical therapy to address mobility, transfer, gait training and evaluation for adaptive equipment needs 1hour/day at least 5 days/week for the duration of the ELOS (see below)       OT:  Occupational therapy to address ADLs, self-care, home management training, functional mobility/transfers and assistive device evaluation, and community re-integration 1hour/day at least 5 days/week for the duration of the ELOS (see below).        ST/Dysphagia:  Speech therapy to address  speech, language, and cognitive deficits as well as swallowing difficulties with retraining/dysphagia management and community re-integration with comprehension, expression, cognitive training 1hour/day at least 5 days/week for the duration of the ELOS (see below).     6. Medical management / Rehabilitation Issues/Adverse Potential affecting function as part of rehabilitation plan.    Diabetes with hyperglycemia  Glargine and sliding scale insulin  Consult hospitalist    Tobacco abuse  Nicotine patch and gum  Will need counseling    Oral thrush  Start nystatin    Seasonal allergies  Start Claritin  Flonase no longer on formulary    Respiratory failure with hypoxia  Likely due to undiagnosed COPD  Start Dulera  Titrate oxygen    Abdominal gas  Start simethicone  Check KUB    I performed a complete drug regimen review and did not identify any potential clinically significant medication issues.    The patient's CODE STATUS was confirmed as FULL CODE on admission, with the patient and/or family at bedside.    REHABILITATION ISSUES/ADVERSE POTENTIAL:  1.  CVA (Cerebrovascular Accident): Continue aspirin and Plavix for secondary prophylaxis as well as lipid and blood pressure management. Patient demonstrates functional deficits in strength, balance, coordination, and ADL's. Patient is admitted to [facility] for comprehensive rehabilitation therapy as described below.   Rehabilitation nursing monitors bowel and bladder control, educates on medication administration, co-morbidities and monitors patient safety.    2.  DVT prophylaxis:  Patient is on nothing for anticoagulation upon transfer.  Start Lovenox.  Encourage OOB. Monitor daily for signs and symptoms of DVT including but not limited to swelling and pain to prevent the development of DVT that may interfere with therapies.    3.  Pain: No issues with pain currently / Controlled with as needed oral analgesics.    4.  Nutrition/Dysphagia: Dietician  monitors nutrient intake, recommend supplements prn and provide nutrition education to pt/family to promote optimal nutrition for wound healing/recovery.     5.  Bladder/bowel:  Start bowel and bladder program, to prevent constipation, urinary retention (which may lead to UTI), and urinary incontinence (which will impact upon pt's functional independence).   - TV Q3h while awake with post void bladder scans, I&O cath for PVRs >400  - up to commode after meal     6.  Skin/dermal ulcer prophylaxis: Monitor for new skin conditions with q.2 h. turns as required to prevent the development of skin breakdown.     7.  GI prophylaxis: Omeprazole to prevent gastritis or GI bleed that would interfere with therapies.    8. Respiratory therapy: RT performs O2 management prn, breathing retraining, pulmonary hygiene and bronchospasm management prn to optimize participation in therapies.    Pt was seen today for 75 min, and entire time spent in face-to-face contact was >50% in counseling and coordination of care as detailed in A/P above.        GOALS/EXPECTED LEVEL OF FUNCTION BASED ON CURRENT MEDICAL AND FUNCTIONAL STATUS (may change based on patient's medical status and rate of impairment recovery):  Transfers:   Supervision  Mobility/Gait:   Supervision  ADL's:   Minimal Assistance  Cognition: Least verbal cues  Swallowing: Least restrictive diet    DISPOSITION: Discharge to pre-morbid independent living setting with the supportive care of patient's family.      ELOS: 14-21 days    Unique Black M.D.  Physical Medicine and Rehabilitation

## 2023-07-05 NOTE — DISCHARGE PLANNING
Spoke with Lara, Sister regarding D/C resources/support.  She is agreeable with an admission.  Nyasia and her son live with Lara in her 2LV home with 5ST to enter.  She will have a bedroom on the entry level.  No need to negotiate a FOS.  PAR to check benefits.  Msg placed to Dr. Wilkerson-seeking medical clearance.     0848-Dr. Wilkerson has medically cleared.    1011-Case is under review by Dr. Black.    1031-Dr. Black has accepted.  Transport has been arranged via GMT between [id number].  Dr. Wilkerson & Lara, Sister are aware.  Msg placed to Lorraine ZHANGN & Amanda GLASS.

## 2023-07-05 NOTE — CARE PLAN
The patient is Stable - Low risk of patient condition declining or worsening    Shift Goals  Clinical Goals: neuro monitoring  Patient Goals: sleep  Family Goals: NICA    Progress made toward(s) clinical / shift goals:    Problem: Knowledge Deficit - Stroke Education  Goal: Patient's knowledge of stroke and risk factors will improve  Outcome: Progressing  Note: Patient educated on relevant risk factors such as the use of tobacco and DM II and how to mitigate those risk factors.      Problem: Neuro Status  Goal: Neuro status will remain stable or improve  Outcome: Progressing  Note: Patient A&O x 4. Decreased sensation in the right hand. RUE 2/5. RLE 4/5.      Problem: Risk for Aspiration  Goal: Patient's risk for aspiration will be absent or decrease  Outcome: Progressing  Note: Patient placed on a  level 6 soft and bite sized diet with thin liquids.      Problem: Fall Risk  Goal: Patient will remain free from falls  Outcome: Progressing  Note: Bed locked and in lowest position. Strip alarm in use. Call light in reach. Patient educated on the importance of calling for assistance prior to ambulating. FWW available during ambulation.      Problem: Pain - Standard  Goal: Alleviation of pain or a reduction in pain to the patient’s comfort goal  Outcome: Progressing  Note: Patient medicated per MAR for 7/10 headache.        Patient is not progressing towards the following goals: N/A

## 2023-07-05 NOTE — PREADMISSION SCREENING NOTE
Pre-Admission Screening Form    Patient Information:   Name: Nyasia Schuster     MRN: [id number]       : 1955      Age: 67 y.o.   Gender: female      Race:        Marital Status:   Family Contact: Lara Schuster        Relationship: Sister [[id number]]  Home Phone:            Cell Phone: [phone]  Advanced Directives: None  Code Status:  FULL  Current Attending Provider: Rachelle Wilkerson D.O.  Referring Physician: Dr. Angel  Physiatrist Consult: Dr. Roman   Referral Date: 23  Primary Payor Source:  MEDICARE  Secondary Payor Source:      Medical Information:   Date of Admission to [facility]  Room Number: S178/02  Rehabilitation Diagnosis: 0001.2 - Stroke: Right Body Involvement (Left Brain)  Immunization History   Administered Date(s) Administered    Influenza Vac Subunit Quad Inj (Pf) 10/26/2017     Not on File  History reviewed. No pertinent past medical history.  History reviewed. No pertinent surgical history.    History Leading to Admission, Conditions that Caused the Need for Rehab (CMS):     Dr. Angel H&P:  Chief Complaint  Patient presents with   Slurred Speech      Since Friday @ 1000   Unilateral Weakness      Right sided weakness. Right arm strength 1/5 compared to left arm 5/5, also starting @ 1000 on friday     History of Presenting Illness  Nyasia Schuster is a 67 y.o. female who presented 2023 with dysarthria and right arm weakness.     Patient has a previous medical history of tobacco use and diabetes.  She moved up to the [facility] to help care for her mom several years ago, and lost her insurance and has been off of metformin ever since.  She is currently on no prescribed medications and has not seen a physician in several years due to her insurance situation.     She reports that 2 days ago, she noticed some difficulty with her speech.  It seemed like it was difficult for her to form words.  Around the same time she noticed that her right  "arm was weak.  Patient is right-hand-dominant.  Her sister who is at bedside and lives next to the patient noticed the speech difficulty 2 days ago, but did not notice any arm weakness until today.  She did not however see her sister yesterday.     Patient reports a little numbness on the right side of her face, and her right arm feels like it is asleep.  She is able to walk, but she states that she ends up walking in circles.  She is unable to tell me if that is because of weakness or dizziness.  This is however not normal for her.  Sister notes that 2 days ago it sounds like \"she was drunk\".  Patient was saying the right words, but they were just slurred.  Her speech is actually much better today.  Otherwise patient notes a headache, which she attributes to allergies.  She does get these headaches often and does not feel this headache is unusual.  She has not had any chest pain or unusual back neck jaw or shoulder pain.  She has not had any palpitations.     I discussed the plan of care with patient and family.    Assessment/Plan:  Justification for Admission Status  I anticipate this patient will require at least two midnights for appropriate medical management, necessitating inpatient admission because focal neurologic deficits     Patient will need a Med/Surg bed on EMERGENCY service .  The need is secondary to focal neurologic deficit.     Hemiparesis of right dominant side (HCC)  Assessment & Plan  CT scan is negative  Differential includes ischemic event versus mass lesion versus demyelinating process  Admit to neurology  Serial neuro exams  Start aspirin  Start statin  PT OT and speech consultations  Keep n.p.o. pending speech eval  Consult neuro  Lifestyle modification with smoking cessation  Appropriate management of diabetes  Monitor blood pressures  Initial EKG and telemetry is sinus  Admit to monitored bed  Cardiac echo  Complete stroke imaging series with CTA of the head and neck as well as MRI as " noted     Given that the time of onset is greater than 36 hours, and her NIHSS score is low, I do not think she is a candidate for thrombolytics or mechanical thrombectomy.     Type 2 diabetes mellitus without complication, without long-term current use of insulin ([facility])  Assessment & Plan  Patient had been prescribed metformin several years ago but lost her insurance and has not been on any medication since.  Started on sliding scale  Check A1c  Currently n.p.o. pending speech eval  Start more definitive management once we know what her A1c is and her p.o. intake has been resumed     Tobacco abuse  Assessment & Plan  Patient counseled x5 minutes.  As needed replacement while in house.     VTE prophylaxis: enoxaparin ppx    Dr. Santos (Neurology) recommendations:  Assessment and Plan:  Nyasia Schuster is a 67 year old woman presenting with R side weakness, R side sensory changes, mild dysarthria- well outside window for acute stroke interventions.  MRI is pending.  Clinical semiology most consistent with small vessel (lacunar) etiology.  At this time, will initiate short-term DAPT, high intensity statin, BP control, and smoking cessation as secondary stroke prevention.     Problem list:  1.  Stroke  2. Hyperlipidemia  3. Type 2 diabetes  4. Smoker     Plan:              - q4h neurochecks/NIHSS              - MRI brain without contrast              - continue ASA 81mg daily for additional 10 days (stop date is 7/13)              - start plavix 75mg daily indefinitely (I have ordered this)              - no need for acute hypertensive response at this time, long-term BP goal is 110-130/60-80; currently at goal without interventions              - stroke labs:  HgbA1c 12.6, LDL 90              - continue atorvastatin 40mg daily for goal LDL < 70              - DM management for goal HgbA1c < 7, acutely aim for FSBS               - TTE completed, results pending- may defer ziopatch monitoring unless MRI reveals  embolic stroke              - PT/OT/SLP              - continue lovenox SQ for DVT chemoppx              - smoking cessation counseling    Dr. Roman (Physiatry) recommendations:  ASSESSMENT:  Patient is a 67 y.o. female admitted with  L MCA stroke      [facility] Code / Diagnosis to Support: 0001.2 - Stroke: Right Body Involvement (Left Brain)     Rehabilitation: Impaired ADLs and mobility  Patient is a good candidate for inpatient rehab based on needs for PT and SLP if patient agreeable to post acute rehab.      Barriers to transfer include: Insurance authorization, TCCs to verify disposition, medical clearance and bed availability      Additional Recommendations:  L MCA stroke   - presented with R sided weakness and slurred speech   - CT Head negative   -CTA neck  negative for LVO   -  CTA head with evidence of M 1 /M2 stenosis   - Neuro consulted   - long teram goal  -130 / 60-80  - MRI brain pending   - ECHO showed EF 70%   - currently on ASA, plavix, statin   - continue with PT and SLP, OT pending      DM with hyperglycemia   - previously non compliant on metformin due to lack of insurance a few years ago (now has medicare0   - on SSI   - BG elevated to 246      Tobacco use   - currently daily smoker   - smoking cessation provided for > 3 mins  - stroke risk education provided      Dispo:  - patient is currently functioning below their level of baseline, recommend post acute rehab  - recommend IRF level therapy with 3hr of therapy 5 days per week if patient agreeable   - piror to acceptance to IRF, will need MRI completed   - TCC to assist with insurance auth and DC support      Medical Complexity:  L MCA stroke   DM   Tobacco use   Impaired mobility and ADLs         DVT PPX: Lovenox     Dr. Wilkerson progress note:  Date of Service  7/4/2023     Chief Complaint  Nyasia Schuster is a 67 y.o. female admitted 7/2/2023 with slurred speech and right arm weakness     Hospital Course  Patient is a 67 year old female  "who presented to [facility] 7/2/2023 with dysarthria and right arm weakness. She has a history of tobacco use and diabetes.  She moved up to the [facility] to help care for her mom several years ago, and lost her insurance and has been off of metformin ever since.  She had been on no prescribed medications and had not seen a physician in several years due to her insurance situation.     She reported that 2 days prior to admission, she noticed some difficulty with her speech.  It seemed like it was difficult for her to form words. Around the same time she noticed that her right arm was weak.  Patient is right-hand-dominant.  Her sister who was at bedside and lives next to the patient also noticed the speech difficulty       Patient reported a little numbness on the right side of her face, and her right arm felt like it was asleep.  She was able to walk, but stated that she ended up walking in circles.  She was unable to tell me if that is because of weakness or dizziness.  This is however not normal for her.  Sister notes that 2 days prior to admission, it sounds like \"she was drunk\". Patient was saying the right words, but they were just slurred.     Interval Problem Update  7/3 Axox3, she reports stable and unchanged right arm weakness and slurred speech, the slurred speech is mild. She reports stable headache, mainly R frontal, currently rated 4/10, no visual changes. Tele so far unremarkable. Awaiting mri and echo. ROS otherwise negative.      7/4 vital signs stable on 3 L nasal cannula.  Glucose within goal.  Echo shows EF 65 to 70% with RVSP 30 mmHg.  MRI of the brain is done pending read.  Neurology recommending aspirin for 10 days stop date 7/13 and Plavix indefinitely.  PT/OT recommending postacute placement.  PM&R determined patient is a good candidate for inpatient rehab.  Patient reports that she was able to walk with a walker still having the right upper extremity weakness with some numbness in her hand.   "   I have discussed this patient's plan of care and discharge plan at IDT rounds today with Case Management, Nursing, Nursing leadership, and other members of the IDT team.     Consultants/Specialty  Neurology  PM&R     Code Status  Full Code     Disposition  Patient is not medically clear, anticipate discharge to inpatient rehab  I have placed the appropriate orders for post-discharge needs.     Review of Systems  Review of Systems   Constitutional: Negative.  Negative for chills, diaphoresis, fever, malaise/fatigue and weight loss.   Respiratory: Negative.  Negative for cough and shortness of breath.    Cardiovascular: Negative.  Negative for chest pain, palpitations and leg swelling.   Gastrointestinal: Negative.  Negative for abdominal pain, nausea and vomiting.   Musculoskeletal: Negative.  Negative for myalgias.   Neurological:  Positive for sensory change and focal weakness. Negative for dizziness, weakness and headaches.   Psychiatric/Behavioral: Negative.  Negative for depression, substance abuse and suicidal ideas.    All other systems reviewed and are negative.     Physical Exam  Temp:  [36.9 °C (98.4 °F)-37.6 °C (99.7 °F)] 37.6 °C (99.7 °F)  Pulse:  [] 100  Resp:  [17-18] 18  BP: (118-152)/(77-91) 152/90  SpO2:  [91 %-95 %] 95 %     Physical Exam  Vitals and nursing note reviewed. Exam conducted with a chaperone present.   Constitutional:       General: She is not in acute distress.     Appearance: Normal appearance. She is not diaphoretic.   HENT:      Head: Normocephalic.   Eyes:      Conjunctiva/sclera: Conjunctivae normal.      Pupils: Pupils are equal, round, and reactive to light.   Cardiovascular:      Rate and Rhythm: Normal rate and regular rhythm.      Pulses: Normal pulses.      Heart sounds: Normal heart sounds.   Pulmonary:      Effort: Pulmonary effort is normal.      Breath sounds: Normal breath sounds.   Abdominal:      General: Abdomen is flat. There is no distension.       Palpations: Abdomen is soft.      Tenderness: There is no abdominal tenderness.   Musculoskeletal:         General: No swelling or deformity. Normal range of motion.   Skin:     General: Skin is warm and dry.      Capillary Refill: Capillary refill takes less than 2 seconds.   Neurological:      Mental Status: She is alert and oriented to person, place, and time.      Sensory: Sensory deficit (right hand) present.      Motor: Weakness (RUE) present.   Psychiatric:         Mood and Affect: Mood normal.         Behavior: Behavior normal.         Thought Content: Thought content normal.      Fluids     Intake/Output Summary (Last 24 hours) at 7/4/2023 1435  Last data filed at 7/4/2023 0900  Gross per 24 hour  Intake 650 ml  Output --  Net 650 ml     Laboratory  Recent Labs    07/02/23  0730  WBC 9.5  RBC 5.33  HEMOGLOBIN 16.2*  HEMATOCRIT 46.6  MCV 87.4  MCH 30.4  MCHC 34.8  RDW 39.2  PLATELETCT 170  MPV 11.3     Recent Labs    07/02/23  0730 07/03/23  0222  SODIUM 137 139  POTASSIUM 4.1 4.0  CHLORIDE 101 103  CO2 22 27  GLUCOSE 316* 161*  BUN 7* 5*  CREATININE 0.42* 0.46*  CALCIUM 9.3 9.1     Recent Labs    07/02/23  0730  APTT 30.5  INR 0.99     Recent Labs    07/03/23  0222  TRIGLYCERIDE 87  HDL 32*  LDL 90     Imaging  EC-ECHOCARDIOGRAM COMPLETE W/O CONT  Final Result     CT-CTA NECK WITH & W/O-POST PROCESSING  Final Result     CT angiogram of the neck within normal limits.     Emphysematous changes.     CT-CTA HEAD WITH & W/O-POST PROCESS  Final Result     1.  Moderate stenosis of the junction of the M1/M2 segment of the left middle cerebral artery.  2.  Probable proximal left PCA stenosis.  3.  No large vessel occlusion or aneurysm.     CT-HEAD W/O  Final Result     1.  Chronic microvascular ischemic type changes.  2.  No acute intracranial abnormality.     DX-CHEST-PORTABLE (1 VIEW)  Final Result     1.  No acute cardiopulmonary disease.  2.  Atherosclerosis     MR-BRAIN-W/O    (Results Pending)      Assessment/Plan  Hemiparesis of right dominant side (HCC)  Assessment & Plan  CT scan is negative  Differential includes ischemic event versus mass lesion versus demyelinating process  Continue tele  Continue serial  neuro exams  Continue statin  Echo noted  Neurology consulted  -Aspirin for 10 days, stop date 7/13  -Plavix indefinitely  Mri done pending official read  PT/OT-postacute placement  PM&R-good candidate for IPR     Type 2 diabetes mellitus without complication, without long-term current use of insulin (HCC)  Assessment & Plan  Patient had been prescribed metformin several years ago but lost her insurance and has not been on any medication since.  continue sliding scale  A1c >12  Speech eval pending  Can likely resume metformin once passes swallow eval   Start more definitive management once we know what her A1c is and her p.o. intake has been resumed     Tobacco abuse  Assessment & Plan  Patient counseled x5 minutes.  As needed replacement while in house.     VTE prophylaxis: enoxaparin ppx    Brain MRI 07-04-23:  IMPRESSION:     1. Age-related cerebral atrophy.     2. Mild to moderate periventricular and pontine white matter changes consistent with chronic microvascular ischemic gliosis or demyelination.     3. Multifocal areas of infarction in the left posterior frontal centrum semiovale and involving several left posterior frontal gyri.    Co-morbidities:  See PMH  Potential Risk - Complications: Aphasia, Cognitive Impairment, Contractures, Deep Vein Thrombosis, Dysphagia, Incontinence, Malnutrition, Pain, Paralysis, Perceptual Impairment, Pneumonia, Pressure Ulcer, and Urinary Tract Infection  Level of Risk: High    Ongoing Medical Management Needed (Medical/Nursing Needs):   Patient Active Problem List    Diagnosis Date Noted    Tobacco abuse 07/02/2023    Type 2 diabetes mellitus without complication, without long-term current use of insulin (HCC) 07/02/2023    Hemiparesis of right dominant side  ([facility]) 07/02/2023    Focal neurological deficit 07/02/2023     Alert with cognitive impairment.    Current Vital Signs:   Temperature: 37 °C (98.6 °F) Pulse: 92 Respiration: 17 Blood Pressure : 120/70  Weight: 74.8 kg (165 lb) Height: 152.4 cm (5')  Pulse Oximetry: 95 % O2 (LPM): 2      Completed Laboratory Reports:  Recent Labs     07/03/23  0222   SODIUM 139   POTASSIUM 4.0   BUN 5*   CREATININE 0.46*   GLUCOSE 161*     Additional Labs: Not Applicable    Prior Living Situation:   Housing / Facility: 2 Story House  Steps Into Home: 5  Steps In Home: 16  Lives with - Patient's Self Care Capacity: Adult Children  Equipment Owned: None    Prior Level of Function / Living Situation:   Physical Therapy: Prior Services: None  Housing / Facility: 2 Story House  Steps Into Home: 5  Steps In Home: 16  Rail: Both Rail (Steps into Home), Left Rail (Steps in Home)  Elevator: No  Bathroom Set up: Bathtub / Shower Combination  Equipment Owned: None  Lives with - Patient's Self Care Capacity: Adult Children  Bed Mobility: Independent  Transfer Status: Independent  Ambulation: Independent  Assistive Devices Used: None  Stairs: Independent  Current Level of Function:   Gait Level Of Assist: Moderate Assist  Assistive Device: Front Wheel Walker  Distance (Feet): 3  Deviation: Ataxic, Step To, Decreased Base Of Support, Shuffled Gait, Decreased Heel Strike, Decreased Toe Off  Weight Bearing Status: fwb  Scooting: Standby Assist  Comments: Pt was in chair upon arrival  Sit to Stand: Minimal Assist  Bed, Chair, Wheelchair Transfer: Minimal Assist  Toilet Transfers: Minimal Assist  Transfer Method: Stand Step  Sitting in Chair: Pt was in chair upon arrival  Sitting Edge of Bed: NT  Standing: 10 min  Occupational Therapy:   Self Feeding: Independent  Grooming / Hygiene: Independent  Bathing: Independent  Dressing: Independent  Toileting: Independent  Medication Management: Independent  Laundry: Independent  Kitchen Mobility:  Independent  Finances: Independent  Home Management: Independent  Shopping: Independent  Prior Level Of Mobility: Independent Without Device in Community, Independent Without Device in Home  Driving / Transportation: Driving Independent  Prior Services: None  Housing / Facility: 2 Story House  Occupation (Pre-Hospital Vocational): Retired Due To Age  Leisure Interests: Gardening  Current Level of Function:   Eating: Independent  Upper Body Dressing: Moderate Assist  Lower Body Dressing: Moderate Assist  Toileting: Moderate Assist  Speech Language Pathology:      Rehabilitation Prognosis/Potential: Good  Estimated Length of Stay: 10-12 days    Nursing:      Incontinent    Scope/Intensity of Services Recommended:  Physical Therapy: 1 hr / day  5 days / week. Therapeutic Interventions Required: Maximize Endurance, Mobility, Strength, and Safety  Occupational Therapy: 1 hr / day 5 days / week. Therapeutic Interventions Required: Maximize Self Care, ADLs, IADLs, and Energy Conservation  Speech & Language Pathology: 1 hr / day 5 days / week. Therapeutic Interventions Required: Maximize Cognition, Swallowing, and Safety  Rehabilitation Nursin/. Therapeutic Interventions Required: Monitor Pain, Skin, Vital Signs, Intake and Output, Labs, Safety, Aspiration Risk, and Family Training  Rehabilitation Physician: 3 - 5 days / week. Therapeutic Interventions Required: Medical Management  Respiratory Care: Pulmonary Toileting. Therapeutic Interventions Required: Pulmonary Toileting and O2 Weaning    She requires 24-hour rehabilitation nursing to manage bowel and bladder function, skin care, nutrition and fluid intake, pulmonary hygiene, pain control, safety, medication management, and patient/family goals. In addition, rehabilitation nursing will reiterate and reinforce therapy skills and equipment use, including ADLs, as well as provide education to the patient and family. Nyasia Landen is willing to participate in  and is able to tolerate the proposed plan of care.    Rehabilitation Goals and Plan (Expected frequency & duration of treatment in the IRF):   Return to the Community, Supervised Level of Care, Modified Independent Level of Care, and Family Able to Provide 24/7 Assistance  Anticipated Date of Rehabilitation Admission: 07-05-23  Patient/Family oriented IRF level of care/facility/plan: Yes  Patient/Family willing to participate in IRF care/facility/plan: Yes  Patient able to tolerate IRF level of care proposed: Yes  Patient has potential to benefit IRF level of care proposed: Yes  Comments: Not Applicable    Special Needs or Precautions - Medical Necessity:  Safety Concerns/Precautions:  Fall Risk / High Risk for Falls, Balance, Cognition, and Bed / Chair Alarm  Pain Management  Precautions: Fall Risk  Comments: R sided weakness, R UE> R LE  IV Site: Peripheral  Requires Oxygen  Current Medications:    Current Facility-Administered Medications Ordered in Epic   Medication Dose Route Frequency Provider Last Rate Last Admin    insulin GLARGINE (Lantus,Semglee) injection  0.2 Units/kg/day Subcutaneous Q EVENING Ainsley Mcfarland M.D.   15 Units at 07/04/23 1733    And    insulin lispro (HumaLOG,AdmeLOG) injection  2-9 Units Subcutaneous 4X/DAY ACHS Ainsley Mcfarland M.D.   2 Units at 07/04/23 2034    And    dextrose 10 % BOLUS 25 g  25 g Intravenous Q15 MIN PRN Ainsley Mcfarland M.D.        lidocaine (Lidoderm) 5 % 2 Patch  2 Patch Transdermal Daily-1400 Ainsley Mcfarland M.D.        clopidogrel (Plavix) tablet 75 mg  75 mg Oral DAILY Vega Santos M.D.   75 mg at 07/05/23 0405    senna-docusate (Pericolace Or Senokot S) 8.6-50 MG per tablet 2 Tablet  2 Tablet Oral BID Reji Angel D.O.   2 Tablet at 07/05/23 0407    And    polyethylene glycol/lytes (Miralax) PACKET 1 Packet  1 Packet Oral QDAY PRN Reji Angel D.OEaston        And    magnesium hydroxide (Milk Of Magnesia) suspension 30 mL  30 mL Oral QDAY PRN Reji  VANESA Angel        And    bisacodyl (Dulcolax) suppository 10 mg  10 mg Rectal QDAY PRN ROSALIND CallawayOEaston        Respiratory Therapy Consult   Nebulization Continuous RT Reji Angel D.O.        enoxaparin (Lovenox) inj 40 mg  40 mg Subcutaneous DAILY AT 1800 Reji Angel D.O.   40 mg at 07/04/23 1740    acetaminophen (Tylenol) tablet 650 mg  650 mg Oral Q6HRS PRN NILSA Callaway.O.   650 mg at 07/05/23 0407    ondansetron (Zofran) syringe/vial injection 4 mg  4 mg Intravenous Q4HRS PRN ROSALIND CallawayOEaston        ondansetron (Zofran ODT) dispertab 4 mg  4 mg Oral Q4HRS PRN ROSALIND CallawayOEaston        nicotine (Nicoderm) 21 MG/24HR 21 mg  21 mg Transdermal Daily-0600 NILSA Callaway.O.   21 mg at 07/05/23 0407    And    nicotine polacrilex (Nicorette) 2 MG piece 2 mg  2 mg Oral Q HOUR PRN ROSALIND CallawayOEaston        atorvastatin (Lipitor) tablet 40 mg  40 mg Oral Q EVENING NILSA Callaway.O.   40 mg at 07/04/23 1740    aspirin (Asa) chewable tab 81 mg  81 mg Oral DAILY NILSA Callaway.OEaston   81 mg at 07/05/23 0406    LORazepam (Ativan) injection 1 mg  1 mg Intravenous Q6HRS PRN ROSALIND CallawayOEaston        oxyCODONE immediate-release (Roxicodone) tablet 5 mg  5 mg Oral Q4HRS PRN Reji Angel D.O.        Or    oxyCODONE immediate release (Roxicodone) tablet 10 mg  10 mg Oral Q4HRS PRN ROSALIND CallawayO.   10 mg at 07/04/23 2033     Current Outpatient Medications Ordered in Epic   Medication Sig Dispense Refill    atorvastatin (LIPITOR) 40 MG Tab Take 1 Tablet by mouth every evening. 30 Tablet 0    acetaminophen (TYLENOL) 325 MG Tab Take 2 Tablets by mouth every 6 hours as needed for Mild Pain, Fever or Moderate Pain. 30 Tablet 0    [START ON 7/6/2023] clopidogrel (PLAVIX) 75 MG Tab Take 1 Tablet by mouth every day. 30 Tablet     aspirin 81 MG EC tablet Take 1 Tablet by mouth every day  for 8 days. 8 Tablet 0    insulin lispro 100 UNIT/ML SC SOPN injection PEN For glucose:  70   - 150  mg/dL =    0 Units  151 - 200  mg/dL =    2 Units  201 - 250  mg/dL =    3 Units  251 - 300  mg/dL  =   5 Units  301 - 350  mg/dL  =   6 Units  351 - 400 mg/dL   =   8 Units  Over 400 mg/dL   =   9 Units 30 mL 0    insulin GLARGINE (LANTUS,SEMGLEE) 100 UNIT/ML Solution Inject 15 Units under the skin every evening. 10 mL     nicotine (NICODERM) 21 MG/24HR PATCH 24 HR Place 1 Patch on the skin every 24 hours. 30 Patch 0    nicotine polacrilex (NICORETTE) 2 MG Gum Take 1 Each by mouth every 1 hour as needed for Smoking Cessation (For nicotine urge).       Diet:   DIET ORDERS (From admission to next 24h)       Start     Ordered    07/03/23 1216  Diet Order Diet: Level 6 - Soft and Bite Sized; Liquid level: Level 0 - Thin; Second Modifier: (optional): Consistent CHO (Diabetic)  ALL MEALS        Question Answer Comment   Diet: Level 6 - Soft and Bite Sized    Liquid level Level 0 - Thin    Second Modifier: (optional) Consistent CHO (Diabetic)        07/03/23 1216                    Anticipated Discharge Destination / Patient/Family Goal:  Destination: Home with Assistance Support System:  Sister & Son  Anticipated home health services: OT and PT  Previously used HH service/ provider: Not Applicable  Anticipated DME Needs: Oxygen, Walker, and Life Line  Outpatient Services: OT and PT  Alternative resources to address additional identified needs:   No future appointments.   Pre-Screen Completed: 7/5/2023 10:04 AM Hadley Pyane L.P.N.

## 2023-07-05 NOTE — DISCHARGE INSTRUCTIONS
"Ischemic Stroke  Discharge Instructions    You experienced an Ischemic Stroke.  Ischemic stroke is the most common type of stroke and happens when an artery in the brain becomes blocked by a plaque fragment or blood clot. Typically, these blockages travel from the heart or larger arteries that supply the brain.  The brain needs a constant supply of blood, which carries oxygen and nutrients it needs to function.  A stroke occurs when one of these arteries to the brain is either blocked or bursts. As a result, part of the brain does not get the blood it needs, so it starts to die.         Stroke Risk Factors    You are at increased risk of having another stroke event.  See your Patient Stroke Guide to help reduce your stroke risk. These are your specific risk factors:  Age - Over 55  Diabetes  Smoking or Tobacco Use     Get help right away if you have any signs of a stroke.  \"BE FAST\" is an easy way to remember the main warning signs of a stroke:  B - Balance. Dizziness, sudden trouble walking, or loss of balance.  E - Eyes. Trouble seeing or a change in how you see.  F - Face. Sudden weakness or loss of feeling in the face. The face or eyelid may droop on one side.  A - Arms. Weakness or loss of feeling in an arm. This happens all of a sudden and most often on one side of the body.  S - Speech. Sudden trouble speaking, slurred speech, or trouble understanding what people say.  T - Time. Time to call emergency services. Write down what time symptoms started.    "

## 2023-07-05 NOTE — PROGRESS NOTES
Patient went to [facility] with [facility]. Gathered all belongings. IV still in place. Ziopatch placed. Report given to Adriana.

## 2023-07-05 NOTE — DISCHARGE PLANNING
Actual Discharge Information     Discharge Disposition: Disch to IP rehab facility or distinct part unit (62)    Patient accepted at [facility]    [facility] transport at [phone] via .     No other CM needs at this time.

## 2023-07-05 NOTE — PROGRESS NOTES
Neurology Progress Note  Neurohospitalist Service, [facility]    Referring Physician: Rachelle Wilkerson D.O.      Interval History: No acute events overnight.  MRI with embolic appearing strokes in L MCA territory.  Stable exam.    Review of systems: In addition to what is detailed in the HPI and/or updated in the interval history, all other systems reviewed and are negative.    Past Medical History, Past Surgical History and Social History reviewed and unchanged from prior    Medications:    Current Facility-Administered Medications:     insulin GLARGINE (Lantus,Semglee) injection, 0.2 Units/kg/day, Subcutaneous, Q EVENING, 15 Units at 07/04/23 1733 **AND** insulin lispro (HumaLOG,AdmeLOG) injection, 2-9 Units, Subcutaneous, 4X/DAY ACHS, 2 Units at 07/04/23 2034 **AND** POC blood glucose manual result, , , Q AC AND BEDTIME(S) **AND** NOTIFY MD and PharmD, , , Once **AND** Administer 20 grams of glucose (approximately 8 ounces of fruit juice) every 15 minutes PRN FSBG less than 70 mg/dL, , , PRN **AND** dextrose 10 % BOLUS 25 g, 25 g, Intravenous, Q15 MIN PRN, Ainsley Mcfarland M.D.    lidocaine (Lidoderm) 5 % 2 Patch, 2 Patch, Transdermal, Daily-1400, Ainsley Mcfarland M.D.    clopidogrel (Plavix) tablet 75 mg, 75 mg, Oral, DAILY, Vega Santos M.D., 75 mg at 07/05/23 0405    senna-docusate (Pericolace Or Senokot S) 8.6-50 MG per tablet 2 Tablet, 2 Tablet, Oral, BID, 2 Tablet at 07/05/23 0407 **AND** polyethylene glycol/lytes (Miralax) PACKET 1 Packet, 1 Packet, Oral, QDAY PRN **AND** magnesium hydroxide (Milk Of Magnesia) suspension 30 mL, 30 mL, Oral, QDAY PRN **AND** bisacodyl (Dulcolax) suppository 10 mg, 10 mg, Rectal, QDAY PRN, Reji Angel D.O.    Respiratory Therapy Consult, , Nebulization, Continuous RT, Reji Angel D.O.    enoxaparin (Lovenox) inj 40 mg, 40 mg, Subcutaneous, DAILY AT 1800, Reji Angel D.O., 40 mg at 07/04/23 1740    acetaminophen (Tylenol)  tablet 650 mg, 650 mg, Oral, Q6HRS PRN, NILSA Callaway.O., 650 mg at 07/05/23 0407    ondansetron (Zofran) syringe/vial injection 4 mg, 4 mg, Intravenous, Q4HRS PRN, NILSA Callaway.O.    ondansetron (Zofran ODT) dispertab 4 mg, 4 mg, Oral, Q4HRS PRN, ROSALIND ClalawayO.    nicotine (Nicoderm) 21 MG/24HR 21 mg, 21 mg, Transdermal, Daily-0600, 21 mg at 07/05/23 0407 **AND** Nicotine Replacement Patient Education Materials, , , Once **AND** nicotine polacrilex (Nicorette) 2 MG piece 2 mg, 2 mg, Oral, Q HOUR PRN, NILSA Callaway.O.    atorvastatin (Lipitor) tablet 40 mg, 40 mg, Oral, Q EVENING, ROSALIND CallawayOEaston, 40 mg at 07/04/23 1740    aspirin (Asa) chewable tab 81 mg, 81 mg, Oral, DAILY, 81 mg at 07/05/23 0406 **OR** [DISCONTINUED] aspirin (Asa) suppository 300 mg, 300 mg, Rectal, DAILY, NILSA Callaway.O.    LORazepam (Ativan) injection 1 mg, 1 mg, Intravenous, Q6HRS PRN, NILSA Callaway.O.    oxyCODONE immediate-release (Roxicodone) tablet 5 mg, 5 mg, Oral, Q4HRS PRN **OR** oxyCODONE immediate release (Roxicodone) tablet 10 mg, 10 mg, Oral, Q4HRS PRN, NILSA Callaway.OEaston, 10 mg at 07/04/23 2033    Physical Examination:   /71   Pulse 81   Temp 37 °C (98.6 °F) (Temporal)   Resp 17   Ht 1.524 m (5')   Wt 74.8 kg (165 lb)   SpO2 91%   BMI 32.22 kg/m²       General: Patient is awake and in no acute distress  Neck: There is normal range of motion  CV: Regular rate   Extremities:  Warm, dry, and intact, without peripheral lower extremity edema    NEUROLOGICAL EXAM:     Mental status: Awake, alert and fully oriented  Speech and language: Speech is mildly dysarthric but fluent. The patient is able to name and repeat, and follow commands  Cranial nerve exam: Visual fields are full. There is no nystagmus. Extraocular muscles are intact. Slight R facial droop.  Motor exam: There is sustained antigravity with no downward drift in L  arm and leg.  R arm with slight shrug, no antigravity strength.  R leg antigravity with slight drift  Sensory exam: Reacts to tactile in all 4 extremities, no neglect to double stim.  There is diminished sensation to R arm and face  Coordination: No ataxia on FTN testing on L  Gait: Deferred due to patient preference     NIHSS: National Institutes of Health Stroke Scale     [0] 1a:Level of Consciousness           0-alert 1-drowsy   2-stupor   3-coma  [0] 1b:LOC Questions                         0-both  1-one      2-neither  [0] 1c:LOC Commands                       0-both  1-one      2-neither  [0] 2: Best Gaze                      0-nl    1-partial  2-forced  [0] 3: Visual Fields                              0-nl    1-partial  2-complete 3-bilat  [1] 4: Facial Paresis                0-nl    1-minor    2-partial  3-full  MOTOR                                              0-nl  [3] 5: Right Arm                       1-drift  [0] 6: Left Arm                                     2-some effort vs gravity  [1] 7: Right Leg                       3-no effort vs gravity  [0] 8: Left Leg                                      4-no movement                                                              x-untestable  [0] 9: Limb Ataxia                    0-abs   1-1_limb   2-2+_limbs                                                              x-untestable  [1] 10:Sensory                        0-nl    1-partial  2-dense  [0] 11:Best Language/Aphasia         0-nl    1-mild/mod 2-severe   3-mute  [1] 12:Dysarthria                     0-nl    1-mild/mod 2-severe                                                              x-untestable  [0] 13:Neglect/Inattention                   0-none  1-partial  2-complete  [7] TOTAL      Objective Data:    Labs:  Lab Results   Component Value Date/Time    PROTHROMBTM 13.0 07/02/2023 07:30 AM    INR 0.99 07/02/2023 07:30 AM      Lab Results   Component Value Date/Time    WBC 9.5 07/02/2023 07:30  AM    RBC 5.33 07/02/2023 07:30 AM    HEMOGLOBIN 16.2 (H) 07/02/2023 07:30 AM    HEMATOCRIT 46.6 07/02/2023 07:30 AM    MCV 87.4 07/02/2023 07:30 AM    MCH 30.4 07/02/2023 07:30 AM    MCHC 34.8 07/02/2023 07:30 AM    MPV 11.3 07/02/2023 07:30 AM    NEUTSPOLYS 67.30 07/02/2023 07:30 AM    LYMPHOCYTES 21.10 (L) 07/02/2023 07:30 AM    MONOCYTES 7.50 07/02/2023 07:30 AM    EOSINOPHILS 3.10 07/02/2023 07:30 AM    BASOPHILS 0.60 07/02/2023 07:30 AM      Lab Results   Component Value Date/Time    SODIUM 139 07/03/2023 02:22 AM    POTASSIUM 4.0 07/03/2023 02:22 AM    CHLORIDE 103 07/03/2023 02:22 AM    CO2 27 07/03/2023 02:22 AM    GLUCOSE 161 (H) 07/03/2023 02:22 AM    BUN 5 (L) 07/03/2023 02:22 AM    CREATININE 0.46 (L) 07/03/2023 02:22 AM      Lab Results   Component Value Date/Time    CHOLSTRLTOT 139 07/03/2023 02:22 AM    LDL 90 07/03/2023 02:22 AM    HDL 32 (A) 07/03/2023 02:22 AM    TRIGLYCERIDE 87 07/03/2023 02:22 AM       Lab Results   Component Value Date/Time    ALKPHOSPHAT 84 07/02/2023 07:30 AM    ASTSGOT 15 07/02/2023 07:30 AM    ALTSGPT 13 07/02/2023 07:30 AM    TBILIRUBIN 0.5 07/02/2023 07:30 AM        Imaging/Testing:    I interpreted and/or reviewed the patient's neuroimaging    MR-BRAIN-W/O   Final Result         1. Age-related cerebral atrophy.      2. Mild to moderate periventricular and pontine white matter changes consistent with chronic microvascular ischemic gliosis or demyelination.      3. Multifocal areas of infarction in the left posterior frontal centrum semiovale and involving several left posterior frontal gyri.      EC-ECHOCARDIOGRAM COMPLETE W/O CONT   Final Result      CT-CTA NECK WITH & W/O-POST PROCESSING   Final Result      CT angiogram of the neck within normal limits.      Emphysematous changes.      CT-CTA HEAD WITH & W/O-POST PROCESS   Final Result      1.  Moderate stenosis of the junction of the M1/M2 segment of the left middle cerebral artery.   2.  Probable proximal left PCA  stenosis.   3.  No large vessel occlusion or aneurysm.      CT-HEAD W/O   Final Result      1.  Chronic microvascular ischemic type changes.   2.  No acute intracranial abnormality.         DX-CHEST-PORTABLE (1 VIEW)   Final Result         1.  No acute cardiopulmonary disease.   2.  Atherosclerosis          Assessment and Plan:  Nyasia Schuster is a 67 year old woman presenting with R side weakness, R side sensory changes, mild dysarthria- well outside window for acute stroke interventions.  MRI brain with embolic appearing L MCA stroke.   She is currently on short-term DAPT, high intensity statin, BP control, and smoking cessation for secondary stroke prevention.  Will complete embolic workup with ziopatch monitoring at discharge.     Problem list:   Stroke  Hyperlipidemia  Type 2 diabetes  Smoker     Plan:              - q4h neurochecks/NIHSS              - continue ASA 81mg daily for additional 10 days (stop date is 7/13)              - continue plavix 75mg daily indefinitely              - long-term BP goal is 110-130/60-80; at goal without interventions              - stroke labs:  HgbA1c 12.6, LDL 90              - continue atorvastatin 40mg daily for goal LDL < 70              - DM management for goal HgbA1c < 7, acutely aim for FSBS               - ziopatch monitoring at discharge               - PT/OT/SLP              - continue lovenox SQ for DVT chemoppx              - smoking cessation counseling   - stroke bridge clinic follow up   - please reconsult Stroke Neurology with any additional questions or concerns    The evaluation of the patient, and recommended management, was discussed with Dr. Wilkerson, attending hospitalist. I have performed a physical exam and reviewed and updated ROS and Plan today (7/5/2023).     Vega Santos MD  Neurohospitalist, Acute Care Services

## 2023-07-05 NOTE — DISCHARGE SUMMARY
Discharge Summary    CHIEF COMPLAINT ON ADMISSION  Chief Complaint   Patient presents with    Slurred Speech     Since Friday @ 1000    Unilateral Weakness     Right sided weakness. Right arm strength 1/5 compared to left arm 5/5, also starting @ 1000 on friday       Reason for Admission  Focal neurological deficit    Admission Date  7/2/2023     CODE STATUS  Full Code    HPI & HOSPITAL COURSE  This is a 67 y.o. female here with dysarthria and right arm weakness.      67 year old female who presented to [facility] 7/2/2023 with dysarthria and right arm weakness. She has a history of tobacco use and diabetes.  She moved up to the [place] [facility] to help care for her mom several years ago, and lost her insurance and has been off of metformin ever since.  She had been on no prescribed medications and had not seen a physician in several years due to her insurance situation.     She reported that 2 days prior to admission, she noticed some difficulty with her speech.  It seemed like it was difficult for her to form words. Around the same time she noticed that her right arm was weak.  Patient is right-hand-dominant.  Her sister who was at bedside and lives next to the patient also noticed the speech difficulty.  Patient was outside the window for any acute stroke interventions.  CT head did not reveal large territory stroke.  CTA head and neck with diffuse atherosclerotic disease without critical stenosis.  Echo shows EF 65 to 70% with RVSP 30 mmHg. MRI of the brain showed multifocal areas of infarction in the left posterior frontal centrum semiovale and involving several left posterior frontal gyri.  Neurology consulted, recommended aspirin for 10 days with stop date 7/13 and Plavix indefinitely.  Zio patch has been ordered as well as a neurology referral for results.  PT/OT recommended postacute placement.  PM&R determined patient is a good candidate for inpatient rehab.    Therefore, she is discharged in good and stable  condition to an inpatient rehabilitation hospital.    The patient met 2-midnight criteria for an inpatient stay at the time of discharge.      FOLLOW UP ITEMS POST DISCHARGE  Follow up with neurology in the stroke Bridge clinic  Zio patch for 14 days  Referral placed for outpatient cardiology  Follow-up with primary care as needed    DISCHARGE DIAGNOSES  Principal Problem:    Focal neurological deficit (POA: Yes)  Active Problems:    Tobacco abuse (POA: Unknown)    Type 2 diabetes mellitus without complication, without long-term current use of insulin (HCC) (POA: Unknown)    Hemiparesis of right dominant side (HCC) (POA: Unknown)  Resolved Problems:    * No resolved hospital problems. *      FOLLOW UP  No future appointments.  No follow-up provider specified.    MEDICATIONS ON DISCHARGE     Medication List        START taking these medications        Instructions   acetaminophen 325 MG Tabs  Commonly known as: Tylenol   Take 2 Tablets by mouth every 6 hours as needed for Mild Pain, Fever or Moderate Pain.  Dose: 650 mg     atorvastatin 40 MG Tabs  Commonly known as: Lipitor   Take 1 Tablet by mouth every evening.  Dose: 40 mg     clopidogrel 75 MG Tabs  Start taking on: July 6, 2023  Commonly known as: Plavix   Take 1 Tablet by mouth every day.  Dose: 75 mg     insulin GLARGINE 100 UNIT/ML Soln  Commonly known as: Lantus,Semglee   Inject 15 Units under the skin every evening.  Dose: 15 Units     insulin lispro 100 UNIT/ML Sopn injection PEN  Commonly known as: HumaLOG/AdmeLOG   For glucose:  70   - 150  mg/dL =    0 Units  151 - 200  mg/dL =    2 Units  201 - 250  mg/dL =    3 Units  251 - 300  mg/dL  =   5 Units  301 - 350  mg/dL  =   6 Units  351 - 400 mg/dL   =   8 Units  Over 400 mg/dL   =   9 Units     nicotine 21 MG/24HR Pt24  Commonly known as: Nicoderm   Place 1 Patch on the skin every 24 hours.  Dose: 1 Patch     nicotine polacrilex 2 MG Gum  Commonly known as: Nicorette   Take 1 Each by mouth every 1 hour  as needed for Smoking Cessation (For nicotine urge).  Dose: 2 mg            CONTINUE taking these medications        Instructions   aspirin 81 MG EC tablet   Take 1 Tablet by mouth every day for 8 days.  Dose: 81 mg     fexofenadine 180 MG tablet  Commonly known as: Allegra   Take 180 mg by mouth every day.  Dose: 180 mg              Allergies  Not on File    DIET  Orders Placed This Encounter   Procedures    Diet Order Diet: Level 6 - Soft and Bite Sized; Liquid level: Level 0 - Thin; Second Modifier: (optional): Consistent CHO (Diabetic)     Standing Status:   Standing     Number of Occurrences:   1     Order Specific Question:   Diet:     Answer:   Level 6 - Soft and Bite Sized [23]     Order Specific Question:   Liquid level     Answer:   Level 0 - Thin     Order Specific Question:   Second Modifier: (optional)     Answer:   Consistent CHO (Diabetic) [4]       ACTIVITY  As tolerated.  Weight bearing as tolerated    LINES, DRAINS, AND WOUNDS  This is an automated list. Peripheral IVs will be removed prior to discharge.  Peripheral IV 07/02/23 18 G Distal;Posterior;Right Forearm (Active)   Site Assessment Clean;Dry;Intact 07/04/23 2000   Dressing Type Transparent Film 07/04/23 2000   Line Status Saline locked;Scrubbed the hub prior to access;Flushed 07/04/23 2000   Dressing Status Clean;Dry;Intact 07/04/23 2000   Dressing Intervention N/A 07/04/23 2000   Dressing Change Due 07/08/23 07/04/23 2000   [place]fil[place] Grading ([facility] [place] 0 07/04/23 2000   Phlebitis Scale ([facility] Only) 0 07/04/23 2000          Peripheral IV 07/02/23 18 G Distal;Posterior;Right Forearm (Active)   Site Assessment Clean;Dry;Intact 07/04/23 2000   Dressing Type Transparent Film 07/04/23 2000   Line Status Saline locked;Scrubbed the hub prior to access;Flushed 07/04/23 2000   Dressing Status Clean;Dry;Intact 07/04/23 2000   Dressing Intervention N/A 07/04/23 2000   Dressing Change Due 07/08/23 07/04/23 2000   [place]  [facility] [place] 07/04/23 2000   Phlebitis Scale ([facility] Only) 0 07/04/23 2000               MENTAL STATUS ON TRANSFER             CONSULTATIONS  Neurology     PROCEDURES  N/A    LABORATORY  Lab Results   Component Value Date    SODIUM 139 07/03/2023    POTASSIUM 4.0 07/03/2023    CHLORIDE 103 07/03/2023    CO2 27 07/03/2023    GLUCOSE 161 (H) 07/03/2023    BUN 5 (L) 07/03/2023    CREATININE 0.46 (L) 07/03/2023        Lab Results   Component Value Date    WBC 9.5 07/02/2023    HEMOGLOBIN 16.2 (H) 07/02/2023    HEMATOCRIT 46.6 07/02/2023    PLATELETCT 170 07/02/2023        Total time of the discharge process exceeds 33 minutes.

## 2023-07-06 ENCOUNTER — APPOINTMENT (OUTPATIENT)
Dept: PHYSICAL THERAPY | Facility: REHABILITATION | Age: 68
DRG: 056 | End: 2023-07-06
Attending: PHYSICAL MEDICINE & REHABILITATION
Payer: MEDICARE

## 2023-07-06 ENCOUNTER — APPOINTMENT (OUTPATIENT)
Dept: OCCUPATIONAL THERAPY | Facility: REHABILITATION | Age: 68
DRG: 056 | End: 2023-07-06
Attending: PHYSICAL MEDICINE & REHABILITATION
Payer: MEDICARE

## 2023-07-06 ENCOUNTER — APPOINTMENT (OUTPATIENT)
Dept: SPEECH THERAPY | Facility: REHABILITATION | Age: 68
DRG: 056 | End: 2023-07-06
Attending: PHYSICAL MEDICINE & REHABILITATION
Payer: MEDICARE

## 2023-07-06 ENCOUNTER — APPOINTMENT (OUTPATIENT)
Dept: RADIOLOGY | Facility: REHABILITATION | Age: 68
DRG: 056 | End: 2023-07-06
Attending: PHYSICAL MEDICINE & REHABILITATION
Payer: MEDICARE

## 2023-07-06 LAB
ALBUMIN SERPL BCP-MCNC: 3.7 G/DL (ref 3.2–4.9)
ALBUMIN/GLOB SERPL: 1.2 G/DL
ALP SERPL-CCNC: 67 U/L (ref 30–99)
ALT SERPL-CCNC: 19 U/L (ref 2–50)
ANION GAP SERPL CALC-SCNC: 12 MMOL/L (ref 7–16)
AST SERPL-CCNC: 22 U/L (ref 12–45)
BASOPHILS # BLD AUTO: 0.7 % (ref 0–1.8)
BASOPHILS # BLD: 0.06 K/UL (ref 0–0.12)
BILIRUB SERPL-MCNC: 0.5 MG/DL (ref 0.1–1.5)
BUN SERPL-MCNC: 7 MG/DL (ref 8–22)
CALCIUM ALBUM COR SERPL-MCNC: 9.7 MG/DL (ref 8.5–10.5)
CALCIUM SERPL-MCNC: 9.5 MG/DL (ref 8.5–10.5)
CHLORIDE SERPL-SCNC: 105 MMOL/L (ref 96–112)
CO2 SERPL-SCNC: 25 MMOL/L (ref 20–33)
CREAT SERPL-MCNC: 0.43 MG/DL (ref 0.5–1.4)
EOSINOPHIL # BLD AUTO: 0.23 K/UL (ref 0–0.51)
EOSINOPHIL NFR BLD: 2.5 % (ref 0–6.9)
ERYTHROCYTE [DISTWIDTH] IN BLOOD BY AUTOMATED COUNT: 39.2 FL (ref 35.9–50)
GFR SERPLBLD CREATININE-BSD FMLA CKD-EPI: 105 ML/MIN/1.73 M 2
GLOBULIN SER CALC-MCNC: 3.2 G/DL (ref 1.9–3.5)
GLUCOSE BLD STRIP.AUTO-MCNC: 165 MG/DL (ref 65–99)
GLUCOSE BLD STRIP.AUTO-MCNC: 179 MG/DL (ref 65–99)
GLUCOSE BLD STRIP.AUTO-MCNC: 197 MG/DL (ref 65–99)
GLUCOSE SERPL-MCNC: 147 MG/DL (ref 65–99)
HCT VFR BLD AUTO: 48.1 % (ref 37–47)
HGB BLD-MCNC: 16.4 G/DL (ref 12–16)
IMM GRANULOCYTES # BLD AUTO: 0.04 K/UL (ref 0–0.11)
IMM GRANULOCYTES NFR BLD AUTO: 0.4 % (ref 0–0.9)
LYMPHOCYTES # BLD AUTO: 2.28 K/UL (ref 1–4.8)
LYMPHOCYTES NFR BLD: 24.9 % (ref 22–41)
MAGNESIUM SERPL-MCNC: 1.7 MG/DL (ref 1.5–2.5)
MCH RBC QN AUTO: 30.5 PG (ref 27–33)
MCHC RBC AUTO-ENTMCNC: 34.1 G/DL (ref 32.2–35.5)
MCV RBC AUTO: 89.4 FL (ref 81.4–97.8)
MONOCYTES # BLD AUTO: 0.73 K/UL (ref 0–0.85)
MONOCYTES NFR BLD AUTO: 8 % (ref 0–13.4)
NEUTROPHILS # BLD AUTO: 5.81 K/UL (ref 1.82–7.42)
NEUTROPHILS NFR BLD: 63.5 % (ref 44–72)
NRBC # BLD AUTO: 0 K/UL
NRBC BLD-RTO: 0 /100 WBC (ref 0–0.2)
PLATELET # BLD AUTO: 180 K/UL (ref 164–446)
PMV BLD AUTO: 11.1 FL (ref 9–12.9)
POTASSIUM SERPL-SCNC: 3.9 MMOL/L (ref 3.6–5.5)
PROT SERPL-MCNC: 6.9 G/DL (ref 6–8.2)
RBC # BLD AUTO: 5.38 M/UL (ref 4.2–5.4)
SODIUM SERPL-SCNC: 142 MMOL/L (ref 135–145)
WBC # BLD AUTO: 9.2 K/UL (ref 4.8–10.8)

## 2023-07-06 PROCEDURE — 80053 COMPREHEN METABOLIC PANEL: CPT

## 2023-07-06 PROCEDURE — 94640 AIRWAY INHALATION TREATMENT: CPT

## 2023-07-06 PROCEDURE — 92610 EVALUATE SWALLOWING FUNCTION: CPT

## 2023-07-06 PROCEDURE — 700102 HCHG RX REV CODE 250 W/ 637 OVERRIDE(OP): Performed by: PHYSICAL MEDICINE & REHABILITATION

## 2023-07-06 PROCEDURE — 770010 HCHG ROOM/CARE - REHAB SEMI PRIVAT*

## 2023-07-06 PROCEDURE — 92523 SPEECH SOUND LANG COMPREHEN: CPT

## 2023-07-06 PROCEDURE — 97530 THERAPEUTIC ACTIVITIES: CPT

## 2023-07-06 PROCEDURE — 97166 OT EVAL MOD COMPLEX 45 MIN: CPT

## 2023-07-06 PROCEDURE — 83735 ASSAY OF MAGNESIUM: CPT

## 2023-07-06 PROCEDURE — 97535 SELF CARE MNGMENT TRAINING: CPT

## 2023-07-06 PROCEDURE — 99232 SBSQ HOSP IP/OBS MODERATE 35: CPT | Performed by: PHYSICAL MEDICINE & REHABILITATION

## 2023-07-06 PROCEDURE — 85025 COMPLETE CBC W/AUTO DIFF WBC: CPT

## 2023-07-06 PROCEDURE — 97162 PT EVAL MOD COMPLEX 30 MIN: CPT

## 2023-07-06 PROCEDURE — 99222 1ST HOSP IP/OBS MODERATE 55: CPT | Mod: AI | Performed by: HOSPITALIST

## 2023-07-06 PROCEDURE — 36415 COLL VENOUS BLD VENIPUNCTURE: CPT

## 2023-07-06 PROCEDURE — 82962 GLUCOSE BLOOD TEST: CPT | Mod: 91

## 2023-07-06 PROCEDURE — 700111 HCHG RX REV CODE 636 W/ 250 OVERRIDE (IP): Mod: JZ | Performed by: PHYSICAL MEDICINE & REHABILITATION

## 2023-07-06 PROCEDURE — 94760 N-INVAS EAR/PLS OXIMETRY 1: CPT

## 2023-07-06 PROCEDURE — A9270 NON-COVERED ITEM OR SERVICE: HCPCS | Performed by: PHYSICAL MEDICINE & REHABILITATION

## 2023-07-06 RX ORDER — ASPIRIN 81 MG/1
81 TABLET, CHEWABLE ORAL DAILY
Status: COMPLETED | OUTPATIENT
Start: 2023-07-07 | End: 2023-07-13

## 2023-07-06 RX ADMIN — INSULIN LISPRO 5 UNITS: 100 INJECTION, SOLUTION INTRAVENOUS; SUBCUTANEOUS at 21:06

## 2023-07-06 RX ADMIN — NYSTATIN 500000 UNITS: 100000 SUSPENSION ORAL at 18:22

## 2023-07-06 RX ADMIN — ENOXAPARIN SODIUM 40 MG: 100 INJECTION SUBCUTANEOUS at 18:23

## 2023-07-06 RX ADMIN — SIMETHICONE 125 MG: 125 TABLET, CHEWABLE ORAL at 18:22

## 2023-07-06 RX ADMIN — NYSTATIN 500000 UNITS: 100000 SUSPENSION ORAL at 11:45

## 2023-07-06 RX ADMIN — SIMETHICONE 125 MG: 125 TABLET, CHEWABLE ORAL at 08:39

## 2023-07-06 RX ADMIN — ASPIRIN 81 MG CHEWABLE TABLET 81 MG: 81 TABLET CHEWABLE at 08:38

## 2023-07-06 RX ADMIN — CLOPIDOGREL BISULFATE 75 MG: 75 TABLET ORAL at 08:39

## 2023-07-06 RX ADMIN — INSULIN LISPRO 2 UNITS: 100 INJECTION, SOLUTION INTRAVENOUS; SUBCUTANEOUS at 11:31

## 2023-07-06 RX ADMIN — NYSTATIN 500000 UNITS: 100000 SUSPENSION ORAL at 20:02

## 2023-07-06 RX ADMIN — ATORVASTATIN CALCIUM 40 MG: 40 TABLET, FILM COATED ORAL at 20:02

## 2023-07-06 RX ADMIN — SIMETHICONE 125 MG: 125 TABLET, CHEWABLE ORAL at 11:29

## 2023-07-06 RX ADMIN — SENNOSIDES AND DOCUSATE SODIUM 2 TABLET: 50; 8.6 TABLET ORAL at 20:02

## 2023-07-06 RX ADMIN — LORATADINE 10 MG: 10 TABLET ORAL at 08:39

## 2023-07-06 RX ADMIN — INSULIN GLARGINE-YFGN 15 UNITS: 100 INJECTION, SOLUTION SUBCUTANEOUS at 21:07

## 2023-07-06 RX ADMIN — NICOTINE TRANSDERMAL SYSTEM 21 MG: 21 PATCH, EXTENDED RELEASE TRANSDERMAL at 05:18

## 2023-07-06 RX ADMIN — INSULIN LISPRO 2 UNITS: 100 INJECTION, SOLUTION INTRAVENOUS; SUBCUTANEOUS at 08:42

## 2023-07-06 RX ADMIN — MOMETASONE FUROATE AND FORMOTEROL FUMARATE DIHYDRATE 2 PUFF: 200; 5 AEROSOL RESPIRATORY (INHALATION) at 19:59

## 2023-07-06 RX ADMIN — NYSTATIN 500000 UNITS: 100000 SUSPENSION ORAL at 08:39

## 2023-07-06 RX ADMIN — INSULIN LISPRO 2 UNITS: 100 INJECTION, SOLUTION INTRAVENOUS; SUBCUTANEOUS at 18:25

## 2023-07-06 RX ADMIN — MOMETASONE FUROATE AND FORMOTEROL FUMARATE DIHYDRATE 2 PUFF: 200; 5 AEROSOL RESPIRATORY (INHALATION) at 06:40

## 2023-07-06 ASSESSMENT — BRIEF INTERVIEW FOR MENTAL STATUS (BIMS)
WHAT DAY OF THE WEEK IS IT: CORRECT
WHAT YEAR IS IT: CORRECT
ASKED TO RECALL SOCK: YES, NO CUE REQUIRED
ASKED TO RECALL BED: YES, NO CUE REQUIRED
ASKED TO RECALL BLUE: YES, NO CUE REQUIRED
WHAT MONTH IS IT: ACCURATE WITHIN 5 DAYS
BIMS SUMMARY SCORE: 15
WHAT DAY OF THE WEEK IS IT: CORRECT
WHAT MONTH IS IT: ACCURATE WITHIN 5 DAYS
INITIAL REPETITION OF BED BLUE SOCK - FIRST ATTEMPT: 3
ASKED TO RECALL BED: YES, NO CUE REQUIRED
ASKED TO RECALL BLUE: YES, AFTER CUEING (A COLOR")"
BIMS SUMMARY SCORE: 14
INITIAL REPETITION OF BED BLUE SOCK - FIRST ATTEMPT: 3
ASKED TO RECALL SOCK: YES, NO CUE REQUIRED
WHAT YEAR IS IT: CORRECT

## 2023-07-06 ASSESSMENT — ACTIVITIES OF DAILY LIVING (ADL)
TOILET_TRANSFER_DESCRIPTION: GRAB BAR;INCREASED TIME;SET-UP OF EQUIPMENT;SUPERVISION FOR SAFETY;VERBAL CUEING
BED_CHAIR_WHEELCHAIR_TRANSFER_DESCRIPTION: ADAPTIVE EQUIPMENT;INCREASED TIME;INITIAL PREPARATION FOR TASK;SQUAT PIVOT TRANSFER TO WHEELCHAIR;SUPERVISION FOR SAFETY;VERBAL CUEING
TOILETING: INDEPENDENT

## 2023-07-06 ASSESSMENT — PAIN DESCRIPTION - PAIN TYPE: TYPE: ACUTE PAIN

## 2023-07-06 ASSESSMENT — GAIT ASSESSMENTS
GAIT LEVEL OF ASSIST: MINIMAL ASSIST
DEVIATION: STEP TO;DECREASED BASE OF SUPPORT;SHUFFLED GAIT;DECREASED HEEL STRIKE;DECREASED TOE OFF
ASSISTIVE DEVICE: QUAD CANE
DISTANCE (FEET): 80

## 2023-07-06 ASSESSMENT — PATIENT HEALTH QUESTIONNAIRE - PHQ9
1. LITTLE INTEREST OR PLEASURE IN DOING THINGS: NOT AT ALL
2. FEELING DOWN, DEPRESSED, IRRITABLE, OR HOPELESS: NOT AT ALL
SUM OF ALL RESPONSES TO PHQ9 QUESTIONS 1 AND 2: 0

## 2023-07-06 NOTE — CARE PLAN
The patient is Watcher - Medium risk of patient condition declining or worsening    Shift Goals  Clinical Goals: Safety  Patient Goals: Safety    Progress made toward(s) clinical / shift goals:    Problem: Pain - Standard  Goal: Alleviation of pain or a reduction in pain to the patient’s comfort goal  Note: Acetaminophen 650 mg PO given for C/O headache with relief.     Problem: Bladder / Voiding  Goal: Patient will establish and maintain regular urinary output  Note: Continent of bladder, OOB-WC to the bathroom, voiding freely.      Problem: Skin Integrity  Goal: Patient's skin integrity will be maintained or improve  Outcome: Progressing  Note: Patient's skin remains intact and free from new or accidental injury this shift.  Will continue to monitor.      Problem: Infection  Goal: Patient will remain free from infection  Outcome: Progressing  Note: Patient remains free from s/s infection; afebrile.  Will continue to monitor.

## 2023-07-06 NOTE — PROGRESS NOTES
4 Eyes Skin Assessment Completed by SILVANA Michael and SILVANA Wright.    Head WDL  Ears WDL  Nose WDL  Mouth WDL  Neck WDL  Breast/Chest WDL  Shoulder Blades WDL  Spine WDL  (R) Arm/Elbow/Hand Bruising  (L) Arm/Elbow/Hand WDL  Abdomen WDL  Groin WDL  Scrotum/Coccyx/Buttocks Redness and Blanching  (R) Leg WDL  (L) Leg WDL  (R) Heel/Foot/Toe WDL  (L) Heel/Foot/Toe WDL          Devices In Places: ZIO patch placed 7/5/23 to chest      Interventions In Place Gray Ear Foams, NC W/Ear Foams, Pillows, and Heels Loaded W/Pillows    Possible Skin Injury No    Pictures Uploaded Into Epic N/A  Wound Consult Placed N/A  RN Wound Prevention Protocol Ordered No

## 2023-07-06 NOTE — PROGRESS NOTES
Patient admitted to facility at 1235 via [facility]; accompanied by hospital transport.  Patient assisted to room and positioned in bed for comfort and safety; call light within reach.  Patient assisted with stowing belongings and oriented to room and facility.  Admission assessment performed and documented in computer.  Admission paperwork completed; signed copies placed in chart.  Will continue to monitor.

## 2023-07-06 NOTE — CARE PLAN
"  Problem: Fall Risk - Rehab  Goal: Patient will remain free from falls  Outcome: Progressing   Deanne Prosper Fall risk Assessment Score: 13    Moderate fall risk Interventions  - Bed and strip alarm   - Yellow sign by the door   - Yellow wrist band \"Fall risk\"  - Room near to the nurse station  - Do not leave patient unattended in the bathroom  - Fall risk education provided        Problem: Knowledge Deficit - Standard  Goal: Patient and family/care givers will demonstrate understanding of plan of care, disease process/condition, diagnostic tests and medications  Outcome: Progressing  Patient admitted to Rehab facility today; patient verbalized understanding safety education. Patient demonstrated proper call light usage.          "

## 2023-07-06 NOTE — THERAPY
Physical Therapy   Initial Evaluation     Patient Name: Nyasia Schuster  Age:  67 y.o., Sex:  female  Medical Record #: [id number]  Today's Date: 7/6/2023     Subjective    Patient seated in w/c and agreeable to PT evaluation.     Objective       07/06/23 1031   PT Charge Group   PT Therapeutic Activities (Units) 1   PT Evaluation PT Evaluation Mod   PT Total Time Spent   PT Individual Total Time Spent (Mins) 60   Prior Living Situation   Prior Services Home-Independent   Housing / Facility 2 Story House   Steps Into Home 5   Steps In Home 15   Rail Left Rail (Steps in Home);Both Rail (Steps into Home)  (unable to reach both rails at same time for entry stairs)   Elevator No   Bathroom Set up Bathtub / Shower Combination;Shower Curtain   Equipment Owned None   Lives with - Patient's Self Care Capacity Adult Children  (sister, Lara, lives next door)   Comments Lives in [place] with adult son, next door to sister; family available to assist upon discharge.   Prior Level of Functional Mobility   Bed Mobility Independent   Transfer Status Independent   Ambulation Independent   Distance Ambulation (Feet) 1000   Assistive Devices Used None   Wheelchair Other (Comments)  (n/a)   Stairs Independent   Comments independent with all mobility, ADLs, and IADLs without a device, including driving. Manages finances and performs light cooking. Enjoys gardening.   Prior Functioning: Everyday Activities   Self Care Independent   Indoor Mobility (Ambulation) Independent   Stairs Independent   Functional Cognition Independent   Prior Device Use None of the given options   Vitals   Pulse Oximetry 93 %   O2 (LPM) 2   O2 Delivery Device Nasal Cannula   ABS (Agitated Behavior Scale)   Agitated Behavior Scale Performed Yes   Short Attention Span, Easy Distractibility, Inability to Concentrate 1   Impulsive, Impatient, Low Tolerance for Pain or Frustration 1   Uncooperative, Resistant to Care, Demanding 1   Violent and/or Threatening  Violence Toward People or Property 1   Explosive and/or Unpredictable Anger 1   Rocking, Rubbing, Moaning, Other Self-Stimulating Behavior 1   Pulling at Tubes, Restraints, etc. 1   Wandering from Treatment Area 1   Restlessness, Pacing, Excessive Movement 1   Repetitive Behaviors, Motor and/or Verbal 1   Rapid, Loud or Excessive Talking 1   Sudden Changes of Mood 1   Easily Initiated - Excessive Crying and/or Laughter 1   Self-Abusiveness, Physical and/or Verbal 1   Agitated Behavior Scale Total Score 14   Level of Severity No Agitation   Passive ROM Lower Body   Passive ROM Lower Body WDL   Active ROM Lower Body    Active ROM Lower Body  WDL   Strength Lower Body   Lower Body Strength  X   Comments LLE 5/5; RLE 4-/5   Sensation Lower Body   Lower Extremity Sensation   WDL   Comments denies numbness, tingling, changes in sensation. BLEs intact to light touch, tactile localization, bilateral simultaneous stimulation   Lower Body Muscle Tone   Lower Body Muscle Tone  X   Comments RLE mildly hypotonic   Balance Assessment   Sitting Balance (Static) Fair   Sitting Balance (Dynamic) Fair -   Standing Balance (Static) Poor   Standing Balance (Dynamic) Poor -   Bed Mobility    Supine to Sit Standby Assist   Sit to Supine Standby Assist   Sit to Stand Minimal Assist   Scooting Standby Assist   Rolling Standby Assist   Roll Left and Right   Assistance Needed Supervision   Physical Assistance Level No physical assistance   CARE Score - Roll Left and Right 4   Roll Left and Right Discharge Goal   Discharge Goal 6   Sit to Lying   Assistance Needed Supervision   Physical Assistance Level No physical assistance   CARE Score - Sit to Lying 4   Sit to Lying Discharge Goal   Discharge Goal 6   Lying to Sitting on Side of Bed   Assistance Needed Supervision   Physical Assistance Level No physical assistance   CARE Score - Lying to Sitting on Side of Bed 4   Lying to Sitting on Side of Bed Discharge Goal   Discharge Goal 6   Sit to  Stand   Assistance Needed Physical assistance   Physical Assistance Level 25% or less   CARE Score - Sit to Stand 3   Sit to Stand Discharge Goal   Discharge Goal 6   Chair/Bed-to-Chair Transfer   Assistance Needed Physical assistance   Physical Assistance Level 25% or less   CARE Score - Chair/Bed-to-Chair Transfer 3   Chair/Bed-to-Chair Transfer Discharge Goal   Discharge Goal 6   Car Transfer   Reason if not Attempted Environmental limitations   CARE Score - Car Transfer 10   Car Transfer Discharge Goal   Discharge Goal 4   Walk 10 Feet   Assistance Needed Physical assistance   Physical Assistance Level 51%-75%   CARE Score - Walk 10 Feet 2   Walk 10 Feet Discharge Goal   Discharge Goal 6   Walk 50 Feet with Two Turns   Assistance Needed Physical assistance   Physical Assistance Level 51%-75%   CARE Score - Walk 50 Feet with Two Turns 2   Walk 50 Feet with Two Turns Discharge Goal   Discharge Goal 6   Walk 150 Feet   Assistance Needed Physical assistance   Physical Assistance Level 51%-75%   CARE Score - Walk 150 Feet 2   Walk 150 Feet Discharge Goal   Discharge Goal 4   Walking 10 Feet on Uneven Surfaces   Assistance Needed Physical assistance   Physical Assistance Level 51%-75%   CARE Score - Walking 10 Feet on Uneven Surfaces 2   Walking 10 Feet on Uneven Surfaces Discharge Goal   Discharge Goal 4   1 Step (Curb)   Assistance Needed Physical assistance   Physical Assistance Level 26%-50%   CARE Score - 1 Step (Curb) 3   1 Step (Curb) Discharge Goal   Discharge Goal 4   4 Steps   Assistance Needed Physical assistance   Physical Assistance Level 26%-50%   CARE Score - 4 Steps 3   4 Steps Discharge Goal   Discharge Goal 4   12 Steps   Assistance Needed Physical assistance   Physical Assistance Level 26%-50%   CARE Score - 12 Steps 3   12 Steps Discharge Goal   Discharge Goal 4   Picking Up Object   Assistance Needed Physical assistance   Physical Assistance Level 26%-50%   CARE Score - Picking Up Object 3  "  Picking Up Object Discharge Goal   Discharge Goal 6   Gait Functional Level of Assist    Gait Level Of Assist Minimal Assist   Assistive Device Quad Cane  (LBQC)   Distance (Feet) 80   # of Times Distance was Traveled 1   Deviation Step To;Decreased Base Of Support;Shuffled Gait;Decreased Heel Strike;Decreased Toe Off  (general instability, impaired RLE motor control)   Stairs Functional Level of Assist   Level of Assist with Stairs Minimal Assist   # of Stairs Climbed 4  (6\" steps with LUE support on rail)   Stairs Description Extra time;Hand rails;Limited by fatigue;Safety concerns;Supervision for safety;Verbal cueing  (variable leg leading, best performance with LLE leading on ascent and RLE leading on descent)   Transfer Functional Level of Assist   Bed, Chair, Wheelchair Transfer Minimal Assist   Bed Chair Wheelchair Transfer Description Adaptive equipment;Increased time;Initial preparation for task;Squat pivot transfer to wheelchair;Supervision for safety;Verbal cueing  (reach pivot transfer vs. stand step transfer with LBQC)   Problem List    Problems Impaired Bed Mobility;Impaired Transfers;Impaired Ambulation;Functional Strength Deficit;Impaired Balance;Decreased Activity Tolerance;Motor Planning / Sequencing   Precautions   Precautions Fall Risk   Comments 2-3L O2 wean, [employer] (currently not working)   Current Discharge Plan   Current Discharge Plan Return to Prior Living Situation   Interdisciplinary Plan of Care Collaboration   IDT Collaboration with  Occupational Therapist;Physical Therapist Assistant (PTA);Respiratory Therapist   Patient Position at End of Therapy Seated;Chair Alarm On;Self Releasing Lap Belt Applied;Call Light within Reach;Tray Table within Reach;Phone within Reach   Collaboration Comments CLOF, POC   Benefit   Therapy Benefit Patient Would Benefit from Inpatient Rehabilitation Physical Therapy to Maximize Functional [place] with ADLs, IADLs and Mobility.   Strengths & " Barriers   Strengths Able to follow instructions;Alert and oriented;Good insight into deficits/needs;Independent prior level of function;Manages pain appropriately;Motivated for self care and independence;Pleasant and cooperative;Supportive family;Willingly participates in therapeutic activities   Barriers Decreased endurance;Fatigue;Hemiparesis;Home accessibility;Impaired activity tolerance;Impaired balance;Limited mobility       Assessment  Patient is 67 y.o. female with a diagnosis of L ischemic stroke. Patient presented on 7/2/23 with slurred speech and R sided weakness; patient reporting a change in speech two days prior to presentation to ED. Head CT showed chronic microvascular ischemic changes, and head CTA showed moderate stenosis of M1/M2 junction of L MCA. ECHO with EF of 75%. MRI also confirms L MCA CVA. PMH includes: DM, tobacco use, bilateral carpal tunnel syndrome.    Additional factors influencing patient status / progress (ie: cognitive factors, co-morbidities, social support, etc): Patient is very motivated to return to prior level of independence without a device. She lives in a [facility] with 5 MONTY with her adult son, and her sister lives next door who will all be able to assist upon discharge. She will be able to stay on the first floor which has a full bathroom. Primary impairments include R hemiparesis and impaired motor control, impaired balance, and decreased endurance/activity tolerance. She is fearful of falling and is currently a high fall risk due to aforementioned impairments. Potential barriers include medical comorbidities and noncompliance with diabetes management. Patient will benefit from skilled physical therapy to address current impairments and maximize functional mobility and safety prior to discharge.      Plan  Recommend Physical Therapy  minutes per day 5-7 days per week for 17-21 days for the following treatments:  PT Group Therapy, PT Gait Training, PT Therapeutic  Exercises, PT Neuro Re-Ed/Balance, PT Aquatic Therapy, PT Therapeutic Activity, and PT Evaluation.    Passport items to be completed:  Get in/out of bed safely, in/out of a vehicle, safely use mobility device, walk or wheel around home/community, navigate up and down stairs, show how to get up/down from the ground, ensure home is accessible, demonstrate HEP, complete caregiver training    Goals:  Long term and short term goals have been discussed with patient and they are in agreement.    Physical Therapy Problems (Active)       Problem: Balance       Dates: Start:  07/06/23         Goal: STG-Within one week, patient will tolerate [place] Balance Scale assessment.       Dates: Start:  07/06/23               Problem: Mobility       Dates: Start:  07/06/23         Goal: STG-Within one week, patient will ambulate household distance 150 ft with LRAD and CGA.       Dates: Start:  07/06/23            Goal: STG-Within one week, patient will ambulate up/down flight of stairs with LUE support and SBA-CGA.       Dates: Start:  07/06/23               Problem: Mobility Transfers       Dates: Start:  07/06/23         Goal: STG-Within one week, patient will perform bed mobility independently.       Dates: Start:  07/06/23            Goal: STG-Within one week, patient will transfer bed to chair with LRAD and SBA-CGA.       Dates: Start:  07/06/23               Problem: PT-Long Term Goals       Dates: Start:  07/06/23         Goal: LTG-By discharge, patient will ambulate household distances mod I with LRAD, community distances supervised with LRAD.       Dates: Start:  07/06/23            Goal: LTG-By discharge, patient will transfer one surface to another with LRAD mod I.       Dates: Start:  07/06/23            Goal: LTG-By discharge, patient will ambulate up/down flight of stairs with LUE support and supervision.       Dates: Start:  07/06/23            Goal: LTG-By discharge, patient will transfer in/out of a car with LRAD and  supervision.       Dates: Start:  07/06/23

## 2023-07-06 NOTE — THERAPY
"Speech Language Pathology   Initial Assessment     Patient Name: Nyasia Schuster  AGE:  67 y.o., SEX:  female  Medical Record #: [id number]  Today's Date: 7/6/2023     Subjective    Per Dr. Black's HPI: \"Per Dr. Roman's consult, \"The patient is a 67 y.o. female with a past medical history of DM (non compliant on metformin) and tobacco use;  who presented on 7/2/2023  7:18 AM with slurred speech and R sided weakness. Per documentation, patient had noticed a change in her speech about 2 days prior to presentation to ED. Patient's sister noted that the patient's speech sounded different about two days ago, when patient had new onset right sided weakness, they presented to the ED. Upon eval in the ED, CT head showed chronic microvascular ischemic type changes. CTA NECK negative for acute findings. CTA head showed moderate stenosis of the junction of M1/M2 segment of the L MCA  Neuro consulted,  Patient was started on ASA, plavix, and statin. long-term BP goal is 110-130/60-80. ECHO showed EF of 70%. Since initial consult, MRI completed which showed left MCA stroke.\"    Pt reports independent PLOF, lives with adult son, in 2SH, pt is able to reside on first floor upon d/c. Pt's sister, lives next door and was present at end of evaluation. Pt reports initial confusion and slowed processing, however, reports she is at baseline at this time. Sister confirmed this statement as well. Pt is an active , did not take any medications previously 2/2 cost, however, is agreeable to take meds at d/c.      Objective       07/06/23 0804   Evaluation Charges   Charges Yes   SLP Speech Language Evaluation Speech Sound Language Comprehension   SLP Oral Pharyngeal Evaluation Oral Pharyngeal Evaluation   SLP Total Time Spent   SLP Individual Total Time Spent (Mins) 90   Precautions   Precautions Fall Risk   Comments 2-3L O2 via [place]ula wean   Prior Living Situation   Prior Services None;Home-Independent   Housing / Facility 2 Story " "House  (pt can live on first floor)   Lives with - Patient's Self Care Capacity Adult Children  (son Roberto, sister lives next door)   Prior Level Of Function   Communication Within Functional Limits   Swallow Within Functional Limits   Dentition Poor Quality    Dentures None   Hearing Within Functional Limits for Evaluation   Hearing Aid None   Vision Reading ;Distance   Patient's Primary Language English   Occupation (Pre-Hospital Vocational) Retired Due To Age   Comments left MCA stroke   Receptive Language / Auditory Comprehension   Receptive Language / Auditory Comprehension WDL   Expressive Language   Expressive Language (WDL) WDL   Reading Comprehension    Reading Comprehension (WDL) WDL   Written Language Expression   Written Language Expression (WDL) WDL   Cognition   Cognitive-Linguistic (WDL) WDL   ABS (Agitated Behavior Scale)   Agitated Behavior Scale Performed No   Cognitive Pattern Assessment   Cognitive Pattern Assessment Used BIMS   Brief Interview for Mental Status (BIMS)   Repetition of Three Words (First Attempt) 3   Temporal Orientation: Year Correct   Temporal Orientation: Month Accurate within 5 days   Temporal Orientation: Day Correct   Recall: \"Sock\" Yes, no cue required   Recall: \"Blue\" Yes, after cueing (\"a color\")   Recall: \"Bed\" Yes, no cue required   BIMS Summary Score 14   Confusion Assessment Method (CAM)   Is there evidence of an acute change in mental status from the patient's baseline? No   Inattention Behavior not present   Disorganized thinking Behavior not present   Altered level of consciousness Behavior not present   Social / Pragmatic Communication   Social / Pragmatic Communication WDL   Tracheostomy   Tracheostomy No   Speech Mechanisms / Voice Production   Speech Mechanisms / Voice Production (WDL) WDL   Labial Function   Labial Function (WDL) WDL   Lingual Function   Lingual Function (WDL) X   Lingual Protrude Minimal   Jaw Function   Jaw Function (WDL) WDL   Velar Function "   Velar Function (WDL) WDL   Laryngeal Function   Laryngeal Function (WDL) WDL   Swallowing   Swallowing (WDL) WDL   Dysphagia Strategies / Recommendations   Strategies / Interventions Recommended (Yes / No) Yes   Compensatory Strategies Finger / Tongue Sweep To Clear Pocketing Left   Diet / Liquid Recommendation Thin (0);Regular - Easy to Chew (7)   Medication Administration  Whole with Liquid Wash   Therapy Interventions No Swallowing Intervention Required   Barriers To Oral Feeding   Barriers To Oral Feeding None   Cervical Ausculatation Not Tested   Pre-Feeding Oral Stimulation Trial Not Tested   Swallowing/Nutritional Status   Swallowing/Nutritional Status Regular food   Eating   Assistance Needed Set-up / clean-up   CARE Score - Eating 5   Eating Discharge Goal   Discharge Goal 6   Functional Level of Assist   Eating Modified Independent   Eating Description Set-up of equipment or meal/tube feeding   Comprehension Modified Independent   Comprehension Description Glasses   Expression Independent   Social Interaction Independent   Problem Solving Modified Independent   Problem Solving Description Increased time   Memory Supervision   Memory Description Verbal cueing;Therapy schedule   Outcome Measures   Outcome Measures Utilized MASA;SCCAN   MASA ([place] Assessment of Swallowing Ability)   Alertness 10   Cooperation 10   Auditory Comprehension 10   Respiration 10   Respiratory Rate for Swallow 5   Dysphasia 5   Dyspraxia 5   Dysarthria 5   Saliva 5   Lip Seal 5   Tongue Movement 10   Tongue Strength 10   Tongue Coordination 10   Oral Preparation 8   Gag 5   Palate 10   Bolus Clearance 8   Oral Transit 10   Cough Reflex 5   Voluntary Cough 10   Voice 10   Trache 10   Pharygneal Phase 10   Pharyngeal Response 10   Diet Recommendations Regular   Fluid Recommendations Regular   Swallow Integrity Unlikely dysphagia/aspiration   Total Score 196   Dysphagia Severity No abnormality detected   Aspiration Severity No  abnormality detected   SCCAN (Scales of Cognitive and Communicative Ability for Neurorehabilitation)   Oral Expression - Raw Score 19   Oral Expression - Scale Performance Score 100   Orientation - Raw Score 12   Orientation - Scale Performance Score 100   Memory - Raw Score 17   Memory - Scale Performance Score 89   Speech Comprehension - Raw Score 13   Speech Comprehension - Scale Performance Score 100   Reading Comprehension - Raw Score 12   Reading Comprehension - Scale Performance Score 100   Writing - Raw Score 7   Writing - Scale Performance Score 100   Attention - Raw Score 16   Attention - Scale Performance Score 100   Problem Solving - Raw Score 23   Problem Solving - Scale Performance Score 100   SCCAN Total Raw Score 91   SCCAN Degree of Severity Typical Functioning   Current Discharge Plan   Current Discharge Plan Return to Prior Living Situation   Benefit   Therapy Benefit Patient would not benefit from Inpatient Rehab Speech-Language Pathology.  No further Speech Therapy recommended at this time.   Interdisciplinary Plan of Care Collaboration   IDT Collaboration with  Occupational Therapist;Nursing   Patient Position at End of Therapy Seated;Call Light within Reach;Family / Friend in Room   Collaboration Comments CLOF and POC   Strengths & Barriers   Strengths Able to follow instructions;Alert and oriented;Effective communication skills;Independent prior level of function;Motivated for self care and independence;Pleasant and cooperative;Supportive family;Willingly participates in therapeutic activities   Barriers Hemiparesis   Speech Language Pathologist Assigned   Assigned SLP / Extension d/c ST 7/6/23 EVAL ONLY       Assessment    Patient is 67 y.o. female with a diagnosis of left MCA CVA.  Additional factors influencing patient status/progress (ie: cognitive factors, co-morbidities, social support, etc): right sided weakness, DM, tobacco use, supportive family.     Pt seen for clinical swallow and  cognitive linguistic assessment this date.  SWALLOW: Isolated oral motor exam unremarkable with the exception of slight lingual deviation to the left and thrush on lingual surface. Pt is currently prescribed nystatin. Pt's dentition is fair, reports self selecting softer foods at home (I.e. does not eat crust on bread or napier). Pt consumed regular textures (napier, eggs, toast, fruit) for breakfast with no overt s/sx of pen/asp, slight oral residue and pocketing on left which pt was able to clear with lingual sweep and liquid wash. Pt with 2 instances of anterior loss of bolus with regular textures when she was talking and chewing at the same time. Recommend advance pt's diet to 7- Regular (Easy to Chew), remain on thin liquids. Pt took meds whole with thins during eval without issue. No further dysphagia intervention warranted at this time.     COGNITIVE:   Administration of The Scales of Cognitive and Communicative Ability for Neurorehabilitation (SCCAN) which assesses cognitive-communicative deficits and functional ability in patients in rehabilitation hospitals, clinics, and skilled nursing facilities. The SCCAN is appropriate for a broad range of neurological patients, provides a measure of both impairment and functional ability.   Pt missed a total of 3 points on the entire assessment. All subtests scoring 100% with the exception of memory which pt scored 89%. No further cognitive linguistic intervention warranted.   Oral Expression - Raw Score: 19  Oral Expression - Scale Performance Score: 100  Orientation - Raw Score: 12  Orientation - Scale Performance Score: 100  Memory - Raw Score: 17  Memory - Scale Performance Score: 89  Speech Comprehension - Raw Score: 13  Speech Comprehension - Scale Performance Score: 100  Reading Comprehension - Raw Score: 12  Reading Comprehension - Scale Performance Score: 100  Writing - Raw Score: 7  Writing - Scale Performance Score: 100  Attention - Raw Score: 16  Attention -  Scale Performance Score: 100  Problem Solving - Raw Score: 23  Problem Solving - Scale Performance Score: 100  SCCAN Total Raw Score: 91  SCCAN Degree of Severity: Typical Functioning      Plan  Recommend d/c Speech Therapy, no further services recommended for dysphagia or cognitive linguistic intervention. Emphasis of therapy on PT/OT for remainder of stay. Pt would benefit from attending group stroke education class while at [facility].  Speech Therapy Problems (Active)       There are no active problems.

## 2023-07-06 NOTE — THERAPY
"Occupational Therapy   Initial Evaluation     Patient Name: Nyasia Schuster  Age:  67 y.o., Sex:  female  Medical Record #: [id number]  Today's Date: 7/6/2023     Subjective    Pt seated in w/c awaiting OT for eval and tx. Agreeable to participate. \"My son isn't working right now.\" Pt's son is able to provide 24/7 upon DC.     Objective       07/06/23 0701   OT Charge Group   Charges Yes   OT Self Care / ADL (Units) 1   OT Evaluation OT Evaluation Mod   OT Total Time Spent   OT Individual Total Time Spent (Mins) 60   Prior Living Situation   Prior Services Home-Independent   Housing / Facility 2 Story House   Steps Into Home 5   Steps In Home 15   Rail Left Rail (Steps in Home);Both Rail (Steps into Home)  (unable to reach both rails at same time for entry stairs)   Elevator No   Bathroom Set up Bathtub / Shower Combination;Shower Curtain   Equipment Owned None   Lives with - Patient's Self Care Capacity Adult Children  (sister, Lara, lives next door)   Comments Lives in [place] with adult son, next door to sister; family available to assist upon discharge.   Prior Level of ADL Function   Self Feeding Independent   Grooming / Hygiene Independent   Bathing Independent   Dressing Independent   Toileting Independent   Prior Level of IADL Function   Medication Management Independent   Laundry Independent   Kitchen Mobility Independent   Finances Independent   Home Management Independent   Shopping Independent   Prior Level Of Mobility Independent Without Device in Community;Independent With Steps in Community;Independent Without Device in Home;Independent With Steps in Home   Driving / Transportation Driving Independent   Occupation (Pre-Hospital Vocational) Retired Due To Age   Prior Functioning: Everyday Activities   Self Care Independent   Indoor Mobility (Ambulation) Independent   Stairs Independent   Functional Cognition Independent   Prior Device Use None of the given options   Vitals   O2 (LPM) 2   O2 Delivery " "Device Nasal Cannula   Pain   Intervention Emotional Support;Distraction   Pain 0 - 10 Group   Location Head   Description Aching;Dull   Comfort Goal Comfort with Movement;Perform Activity   Therapist Pain Assessment Prior to Activity  (Pt reporting a consistent dull headache since admission.)   Cognition    Cognition / Consciousness WDL   Level of Consciousness Alert   Comments Pt pleasant and verbose, but able to redirect. Some scattared subject jumping during session but otherwise A&Ox4.   ABS (Agitated Behavior Scale)   Agitated Behavior Scale Performed No   Cognitive Pattern Assessment   Cognitive Pattern Assessment Used BIMS   Brief Interview for Mental Status (BIMS)   Repetition of Three Words (First Attempt) 3   Temporal Orientation: Year Correct   Temporal Orientation: Month Accurate within 5 days   Temporal Orientation: Day Correct   Recall: \"Sock\" Yes, no cue required   Recall: \"Blue\" Yes, no cue required   Recall: \"Bed\" Yes, no cue required   BIMS Summary Score 15   Confusion Assessment Method (CAM)   Is there evidence of an acute change in mental status from the patient's baseline? No   Inattention Behavior not present   Disorganized thinking Behavior not present   Altered level of consciousness Behavior not present   Vision Screen   Vision Not tested  (Pt reporting no visual deficits since admission. No overt deficits noted in function.)   Passive ROM Upper Body   Passive ROM Upper Body WDL   Comments BUES   Active ROM Upper Body   Active ROM Upper Body  X   Dominant Hand Right   Comments LUE WFL, RUE limited proximally and distally to ~1/4-1/2 full ROM, Elbow WFL   Strength Upper Body   Upper Body Strength  X   Comments LUE WFL, RUE grossly assessed at 2-/5 at shoulder, wrist, and digits. 3/5 at elbow in flex/ext.   Sensation Upper Body   Upper Extremity Sensation  X   Comments KRIS WFL, Pt reporting C4aw-0wd digit tingling, and normalized sensation over remainder of UE. Light touch intact   Upper Body " Muscle Tone   Upper Body Muscle Tone  X   Rt Upper Extremity Muscle Tone Hypotonic   Balance Assessment   Sitting Balance (Static) Fair   Sitting Balance (Dynamic) Fair -   Standing Balance (Static) Poor   Standing Balance (Dynamic) Poor -   Weight Shift Sitting Fair   Weight Shift Standing Poor   Comments As supported by GB in function.   Coordination Upper Body   Coordination X   Fine Motor Coordination RUE inhibited by hypotonicity   Gross Motor Coordination RUE inhibited by hypotonicity   Eating   Assistance Needed Physical assistance  (Will require assist for cutting, pt is using non-dom UE)   Physical Assistance Level 25% or less   CARE Score - Eating 3   Eating Discharge Goal   Discharge Goal 6   Oral Hygiene   Assistance Needed Physical assistance   Physical Assistance Level 25% or less   CARE Score - Oral Hygiene 3   Oral Hygiene Discharge Goal   Discharge Goal 6   Shower/Bathe Self   Assistance Needed Physical assistance   Physical Assistance Level 25% or less   CARE Score - Shower/Bathe Self 3   Shower/Bathe Self Discharge Goal   Discharge Goal 6   Upper Body Dressing   Assistance Needed Set-up / clean-up;Supervision   CARE Score - Upper Body Dressing 4   Upper Body Dressing Discharge Goal   Discharge Goal 6   Lower Body Dressing   Assistance Needed Physical assistance   Physical Assistance Level 25% or less   CARE Score - Lower Body Dressing 3   Lower Body Dressing Discharge Goal   Discharge Goal 6   Putting On/Taking Off Footwear   Assistance Needed Physical assistance   Physical Assistance Level 26%-50%   CARE Score - Putting On/Taking Off Footwear 3   Putting On/Taking Off Footwear Discharge Goal   Discharge Goal 6   Toileting Hygiene   Assistance Needed Physical assistance   Physical Assistance Level 26%-50%   CARE Score - Toileting Hygiene 3   Toileting Hygiene Discharge Goal   Discharge Goal 6   Toilet Transfer   Assistance Needed Physical assistance   Physical Assistance Level 25% or less   CARE  Score - Toilet Transfer 3   Toilet Transfer Discharge Goal   Discharge Goal 6   Hearing, Speech, and Vision   Ability to Hear Adequate   Ability to See in Adequate Light Adequate   Expression of Ideas and Wants Without difficulty   Understanding Verbal and Non-Verbal Content Understands   Functional Level of Assist   Eating Minimal Assist   Eating Description Set-up of equipment or meal/tube feeding  (Will require assist for cutting, pt is using non-dom UE.)   Grooming Minimal Assist;Seated   Grooming Description Increased time;Seated in wheelchair at sink;Supervision for safety;Verbal cueing;Set-up of equipment  (Min A to open toothpaste for oral care)   Bathing Minimal Assist   Bathing Description Grab bar;Hand held shower;Tub bench;Assit with back;Assit with perineal;Increased time;Set up for shower sleeve;Supervision for safety;Verbal cueing  (Min A for standing balance during rear wash with GB for UB support. Completed all remaining parts seated with figure-4 for feet.)   Upper Body Dressing Stand by Assist   Upper Body Dressing Description Increased time;Initial preparation for task;Supervision for safety;Set-up of equipment   Lower Body Dressing Moderate Assist   Lower Body Dressing Description Grab bar;Assist with closures;Increased time;Initial preparation for task;Set-up of equipment;Supervision for safety;Verbal cueing  (Min A for standing balance during hip hike and assist for R-side, and with donning socks. Pt able to doff, GB for UB support)   Toileting Moderate Assist   Toileting Description Assist for standing balance;Assist to pull pants down;Assist to pull pants up;Grab bar;Increased time;Initial preparation for task;Set-up of equipment;Supervision for safety;Verbal cueing  (SBA when seated during hygiene, Assist for draw up<>down on R-side in standing with assist for balance.)   Toilet Transfers Minimal Assist   Toilet Transfer Description Grab bar;Increased time;Set-up of equipment;Supervision  for safety;Verbal cueing  (Stand step with GB with Min A for balance)   Tub / Shower Transfers Minimal Assist   Tub Shower Transfer Description Verbal cueing;Supervision for safety;Set-up of equipment;Increased time;Shower bench;Grab bar  (Stand step with GB with Min A for balance)   Problem List   Problem List Decreased Active Daily Living Skills;Decreased Homemaking Skills;Decreased Upper Extremity Strength Right;Decreased Upper Extremity AROM Right;Decreased Activity Tolerance;Decreased Functional Mobility;Impaired Posture / Trunk Alignment;Impaired Coordination Right Upper Extremity;Impaired Sensation Right Upper Extremity;Impaired Upper Extremity Tone Right;Impaired Postural Control / Balance   Precautions   Precautions Fall Risk   Comments 2-3L O2 wean, Ziop[employer] (currently not working)   Current Discharge Plan   Current Discharge Plan Return to Prior Living Situation   Benefit    Therapy Benefit Patient Would Benefit from Inpatient Rehab Occupational Therapy to Maximize [place] with ADLs, IADLs and Functional Mobility.   Interdisciplinary Plan of Care Collaboration   IDT Collaboration with  Physical Therapist;Nursing;Speech Therapist   Patient Position at End of Therapy Seated;Chair Alarm On;Self Releasing Lap Belt Applied;Call Light within Reach;Tray Table within Reach;Phone within Reach   Collaboration Comments CLOF, POC   Equipment Needs   Assistive Device / DME Parallel Bars;Front-Wheel Walker;Wheelchair;Bryce Walker;Tub Transfer Bench;Grab Bars In Shower / Tub;Grab Bars By Toilet   Adaptive Equipment Reacher;Elastic Shoe Laces;Long Handled Shoe Horn;Long Handled Sponge;Built Up Utensils;Other (Comments)  (rocker knife)   Strengths & Barriers   Strengths Able to follow instructions;Alert and oriented;Effective communication skills;Good carryover of learning;Independent prior level of function;Good insight into deficits/needs;Making steady progress towards goals;Motivated for self care and  independence;Pleasant and cooperative;Supportive family;Willingly participates in therapeutic activities   Barriers Bowel incontinence;Decreased endurance;Fatigue;Generalized weakness;Hemiplegia;Home accessibility;Impaired activity tolerance;Impaired balance;Limited mobility       Patient education: reviewed OT plan of care, rehab expectations, goal setting, ADL needs, orientation to schedule, role of OT in recovery, fall risk and use of call light.     Assessment  Patient is 67 y.o. Female with a diagnosis of left MCA stroke.  Additional factors influencing patient status / progress (ie: cognitive factors, co-morbidities, social support, etc): Per Dr. Black H&P -  Pt with a past medical history of DM (non compliant on metformin) and tobacco use;  who presented on 7/2/2023  7:18 AM with slurred speech and R sided weakness. Per documentation, patient had noticed a change in her speech about 2 days prior to presentation to ED. Patient's sister noted that the patient's speech sounded different about two days ago, when patient had new onset right sided weakness, they presented to the ED. Upon eval in the ED, CT head showed chronic microvascular ischemic type changes. CTA NECK negative for acute findings. CTA head showed moderate stenosis of the junction of M1/M2 segment of the L MCA  Neuro consulted,  Patient was started on ASA, plavix, and statin. long-term BP goal is 110-130/60-80. MRI completed which showed left MCA stroke. HgbA1c 12.6, LDL 90, ECHO EF 75 %.     OT evaluation performed today; functional performance at today's assessment is as above. During evaluation pt demonstrated good safety awareness with use of call light and her current deficits with sitting and standing balance. Pt presents to rehab below her normal baseline Ind level, currently functioning between Mod A-SPV for ADLs, and Min A for functional transfers. Pt is limited by decreased strength, decreased balance, decreased endurance, limited AROM,  and decreased LB strength and sensory changes. Pt lives in a HOUSING FACILITY: 2 Story House with 5-6 MONTY and son who can assist 24/7. Pt will benefit from ongoing care from this skilled interdisciplinary high intensity rehabilitation program to address the aforementioned deficits in order to maximize ind with ADLs, IADLs, and household/community mobility while reducing burden of care, supporting a safe return home upon DC.        Plan  Recommend Occupational Therapy  minutes per day 5-7 days per week for 17-21  days for the following treatments:  OT Orthotics Training, OT E Stim Attended, OT Group Therapy, OT Self Care/ADL, OT Cognitive Skill Dev, OT Community Reintegration, OT Manual Ther Technique, OT Neuro Re-Ed/Balance, OT Sensory Int Techniques, OT Therapeutic Activity, OT Aquatic Therapy, OT Evaluation, and OT Therapeutic Exercise.    Passport items to be completed:  Perform bathroom transfers, complete dressing, complete feeding, get ready for the day, prepare a simple meal, participate in household tasks, adapt home for safety needs, demonstrate home exercise program, complete caregiver training     Goals:  Long term and short term goals have been discussed with patient and they are in agreement.    Occupational Therapy Goals (Active)       Problem: Bathing       Dates: Start:  07/06/23         Goal: STG-Within one week, patient will bathe at SBA using DME/AE PRN.       Dates: Start:  07/06/23               Problem: Dressing       Dates: Start:  07/06/23         Goal: STG-Within one week, patient will dress LB at CGA level using DME/AE PRN.       Dates: Start:  07/06/23               Problem: Eating       Dates: Start:  07/06/23         Goal: STG-Within one week, patient will feed self at Mod I level using AE PRN.       Dates: Start:  07/06/23               Problem: Functional Transfers       Dates: Start:  07/06/23         Goal: STG-Within one week, patient will transfer to toilet at SBA level using  DME PRN.       Dates: Start:  07/06/23               Problem: Grooming       Dates: Start:  07/06/23         Goal: STG-Within one week, patient will complete grooming at SPV level using AE PRN.       Dates: Start:  07/06/23               Problem: OT Long Term Goals       Dates: Start:  07/06/23         Goal: LTG-By discharge, patient will complete basic self care tasks at SBA-Mod I level using DME/AE PRN.       Dates: Start:  07/06/23            Goal: LTG-By discharge, patient will perform bathroom transfers at SPV-Mod I level using DME/AE PRN.       Dates: Start:  07/06/23            Goal: LTG-By discharge, patient will complete basic home management at Min A-Mod I level using DME/AE PRN.       Dates: Start:  07/06/23               Problem: Toileting       Dates: Start:  07/06/23         Goal: STG-Within one week, patient will complete toileting tasks at CGA level using DME PRN.       Dates: Start:  07/06/23

## 2023-07-06 NOTE — FLOWSHEET NOTE
07/06/23 0640   Events/Summary/Plan   Events/Summary/Plan mdi   Vital Signs   Pulse 89   Respiration 20   Pulse Oximetry 94 %   $ Pulse Oximetry (Spot Check) Yes   Respiratory Assessment   Level of Consciousness Alert   Chest Exam   Work Of Breathing / Effort Within Normal Limits   Breath Sounds   RUL Breath Sounds Diminished   RML Breath Sounds Diminished   RLL Breath Sounds Diminished   OPAL Breath Sounds Diminished   LLL Breath Sounds Diminished   Oxygen   O2 (LPM) 2   O2 Delivery Device Nasal Cannula

## 2023-07-06 NOTE — PROGRESS NOTES
Rehab Progress Note     Date of Service: 7/6/2023  Chief Complaint: Follow-up stroke    Interval Events (Subjective)    Patient seen and examined today in her room.  Her sister is present.  Patient reports she slept well last night.  She moved her bowels yesterday.  She was seen and evaluated by speech therapy and was not found to have any cognitive or swallowing impairments.  Her postvoid residuals 140.  Her Zio patch currently is not connecting and the nurse is working on this.  She denies any pain.  She and her sister have quite the sense of humor.    Patient has no other new questions, concerns, or complaints today.     Objective:  VITAL SIGNS: /83   Pulse 91   Temp 36.6 °C (97.8 °F) (Oral)   Resp 18   Ht 1.524 m (5')   Wt 79.8 kg (176 lb)   SpO2 93%   BMI 34.37 kg/m²   Gen: alert, no apparent distress  CV: Regular rate, regular rhythm  Resp: Clear to auscultation bilaterally  Neuro: Right hemiparesis    Recent Results (from the past 72 hour(s))   EC-ECHOCARDIOGRAM COMPLETE W/O CONT    Collection Time: 07/03/23  4:30 PM   Result Value Ref Range    Eject.Frac. MOD BP 59.03     Eject.Frac. MOD 4C 66.24     Eject.Frac. MOD 2C 62.28     Left Ventrical Ejection Fraction 70    POCT glucose device results    Collection Time: 07/03/23  5:16 PM   Result Value Ref Range    POC Glucose, Blood 173 (H) 65 - 99 mg/dL   POCT glucose device results    Collection Time: 07/03/23  5:56 PM   Result Value Ref Range    POC Glucose, Blood 149 (H) 65 - 99 mg/dL   POCT glucose device results    Collection Time: 07/03/23  9:40 PM   Result Value Ref Range    POC Glucose, Blood 129 (H) 65 - 99 mg/dL   POCT glucose device results    Collection Time: 07/04/23  7:59 AM   Result Value Ref Range    POC Glucose, Blood 137 (H) 65 - 99 mg/dL   POCT glucose device results    Collection Time: 07/04/23 12:02 PM   Result Value Ref Range    POC Glucose, Blood 141 (H) 65 - 99 mg/dL   POCT glucose device results    Collection Time: 07/04/23   5:26 PM   Result Value Ref Range    POC Glucose, Blood 162 (H) 65 - 99 mg/dL   POCT glucose device results    Collection Time: 07/04/23  8:32 PM   Result Value Ref Range    POC Glucose, Blood 198 (H) 65 - 99 mg/dL   POCT glucose device results    Collection Time: 07/05/23  8:03 AM   Result Value Ref Range    POC Glucose, Blood 133 (H) 65 - 99 mg/dL   CoV-2, Flu A/B, And RSV by PCR ([employer])    Collection Time: 07/05/23  1:15 PM    Specimen: Respirate   Result Value Ref Range    Influenza virus A RNA Negative Negative    Influenza virus B, PCR Negative Negative    RSV, PCR Negative Negative    SARS-CoV-2 by PCR NotDetected     SARS-CoV-2 Source NP Swab    POCT glucose device results    Collection Time: 07/05/23  3:50 PM   Result Value Ref Range    POC Glucose, Blood 231 (H) 65 - 99 mg/dL   POCT glucose device results    Collection Time: 07/05/23  5:39 PM   Result Value Ref Range    POC Glucose, Blood 159 (H) 65 - 99 mg/dL   POCT glucose device results    Collection Time: 07/05/23  8:36 PM   Result Value Ref Range    POC Glucose, Blood 233 (H) 65 - 99 mg/dL   CBC with Differential    Collection Time: 07/06/23  5:35 AM   Result Value Ref Range    WBC 9.2 4.8 - 10.8 K/uL    RBC 5.38 4.20 - 5.40 M/uL    Hemoglobin 16.4 (H) 12.0 - 16.0 g/dL    Hematocrit 48.1 (H) 37.0 - 47.0 %    MCV 89.4 81.4 - 97.8 fL    MCH 30.5 27.0 - 33.0 pg    MCHC 34.1 32.2 - 35.5 g/dL    RDW 39.2 35.9 - 50.0 fL    Platelet Count 180 164 - 446 K/uL    MPV 11.1 9.0 - 12.9 fL    Neutrophils-Polys 63.50 44.00 - 72.00 %    Lymphocytes 24.90 22.00 - 41.00 %    Monocytes 8.00 0.00 - 13.40 %    Eosinophils 2.50 0.00 - 6.90 %    Basophils 0.70 0.00 - 1.80 %    Immature Granulocytes 0.40 0.00 - 0.90 %    Nucleated RBC 0.00 0.00 - 0.20 /100 WBC    Neutrophils (Absolute) 5.81 1.82 - 7.42 K/uL    Lymphs (Absolute) 2.28 1.00 - 4.80 K/uL    Monos (Absolute) 0.73 0.00 - 0.85 K/uL    Eos (Absolute) 0.23 0.00 - 0.51 K/uL    Baso (Absolute) 0.06 0.00 - 0.12 K/uL     Immature Granulocytes (abs) 0.04 0.00 - 0.11 K/uL    NRBC (Absolute) 0.00 K/uL   Comp Metabolic Panel (CMP)    Collection Time: 07/06/23  5:35 AM   Result Value Ref Range    Sodium 142 135 - 145 mmol/L    Potassium 3.9 3.6 - 5.5 mmol/L    Chloride 105 96 - 112 mmol/L    Co2 25 20 - 33 mmol/L    Anion Gap 12.0 7.0 - 16.0    Glucose 147 (H) 65 - 99 mg/dL    Bun 7 (L) 8 - 22 mg/dL    Creatinine 0.43 (L) 0.50 - 1.40 mg/dL    Calcium 9.5 8.5 - 10.5 mg/dL    AST(SGOT) 22 12 - 45 U/L    ALT(SGPT) 19 2 - 50 U/L    Alkaline Phosphatase 67 30 - 99 U/L    Total Bilirubin 0.5 0.1 - 1.5 mg/dL    Albumin 3.7 3.2 - 4.9 g/dL    Total Protein 6.9 6.0 - 8.2 g/dL    Globulin 3.2 1.9 - 3.5 g/dL    A-G Ratio 1.2 g/dL   Magnesium    Collection Time: 07/06/23  5:35 AM   Result Value Ref Range    Magnesium 1.7 1.5 - 2.5 mg/dL   CORRECTED CALCIUM    Collection Time: 07/06/23  5:35 AM   Result Value Ref Range    Correct Calcium 9.7 8.5 - 10.5 mg/dL   ESTIMATED GFR    Collection Time: 07/06/23  5:35 AM   Result Value Ref Range    GFR (CKD-EPI) 105 >60 mL/min/1.73 m 2   POCT glucose device results    Collection Time: 07/06/23  7:37 AM   Result Value Ref Range    POC Glucose, Blood 179 (H) 65 - 99 mg/dL       Scheduled Medications   Medication Dose Frequency    aspirin  81 mg DAILY    atorvastatin  40 mg Q EVENING    clopidogrel  75 mg DAILY    enoxaparin (LOVENOX) injection  40 mg DAILY AT 1800    insulin GLARGINE  0.2 Units/kg/day Q EVENING    And    insulin lispro  2-9 Units 4X/DAY ACHS    lidocaine  2 Patch DAILY    nicotine  21 mg Daily-0600    senna-docusate  2 Tablet BID    nystatin  5 mL 4X/DAY    mometasone-formoterol  2 Puff BID (RT)    simethicone  125 mg TID WITH MEALS    loratadine  10 mg DAILY       Current Diet Order   Procedures    Diet Order Diet: Level 6 - Soft and Bite Sized; Liquid level: Level 0 - Thin; Second Modifier: (optional): Consistent CHO (Diabetic)       Assessment:    This patient is a 67 y.o. female admitted for  acute inpatient rehabilitation with Ischemic stroke (HCC).    Problem List/Medical Decision Making and Plan:    Left MCA stroke  Right hemiparesis  PT/OT, 1.5 hr each discipline, 5 days per week    Continue aspirin and Plavix to complete 10 days dual antiplatelet then Plavix alone    Continue atorvastatin    Patient has cardiac monitor in place    Diabetes with hyperglycemia  Continue glargine and sliding scale insulin  Appreciate hospitalist assistance     Tobacco abuse  Continue nicotine patch and gum  Will need counseling     Oral thrush  Continue nystatin     Seasonal allergies  Continue Claritin  Flonase no longer on formulary     Respiratory failure with hypoxia  Likely due to undiagnosed COPD  Continue Dulera  Titrate oxygen     Abdominal gas  Continue simethicone    Constipation  Resolved  Last bowel movement 7/5    DVT prophylaxis  Continue Lovenox          Unique Black M.D.  Physical Medicine and Rehabilitation

## 2023-07-07 ENCOUNTER — APPOINTMENT (OUTPATIENT)
Dept: PHYSICAL THERAPY | Facility: REHABILITATION | Age: 68
DRG: 056 | End: 2023-07-07
Attending: PHYSICAL MEDICINE & REHABILITATION
Payer: MEDICARE

## 2023-07-07 ENCOUNTER — APPOINTMENT (OUTPATIENT)
Dept: OCCUPATIONAL THERAPY | Facility: REHABILITATION | Age: 68
DRG: 056 | End: 2023-07-07
Attending: PHYSICAL MEDICINE & REHABILITATION
Payer: MEDICARE

## 2023-07-07 LAB
GLUCOSE BLD STRIP.AUTO-MCNC: 168 MG/DL (ref 65–99)
GLUCOSE BLD STRIP.AUTO-MCNC: 175 MG/DL (ref 65–99)
GLUCOSE BLD STRIP.AUTO-MCNC: 179 MG/DL (ref 65–99)
GLUCOSE BLD STRIP.AUTO-MCNC: 231 MG/DL (ref 65–99)
GLUCOSE BLD STRIP.AUTO-MCNC: 258 MG/DL (ref 65–99)

## 2023-07-07 PROCEDURE — 97032 APPL MODALITY 1+ESTIM EA 15: CPT

## 2023-07-07 PROCEDURE — 97116 GAIT TRAINING THERAPY: CPT | Mod: CQ

## 2023-07-07 PROCEDURE — 700111 HCHG RX REV CODE 636 W/ 250 OVERRIDE (IP): Mod: JZ | Performed by: PHYSICAL MEDICINE & REHABILITATION

## 2023-07-07 PROCEDURE — 97530 THERAPEUTIC ACTIVITIES: CPT

## 2023-07-07 PROCEDURE — 97112 NEUROMUSCULAR REEDUCATION: CPT | Mod: CQ

## 2023-07-07 PROCEDURE — 94760 N-INVAS EAR/PLS OXIMETRY 1: CPT

## 2023-07-07 PROCEDURE — 99232 SBSQ HOSP IP/OBS MODERATE 35: CPT | Performed by: HOSPITALIST

## 2023-07-07 PROCEDURE — 99232 SBSQ HOSP IP/OBS MODERATE 35: CPT | Performed by: PHYSICAL MEDICINE & REHABILITATION

## 2023-07-07 PROCEDURE — 97110 THERAPEUTIC EXERCISES: CPT | Mod: CQ

## 2023-07-07 PROCEDURE — A9270 NON-COVERED ITEM OR SERVICE: HCPCS | Performed by: PHYSICAL MEDICINE & REHABILITATION

## 2023-07-07 PROCEDURE — 94640 AIRWAY INHALATION TREATMENT: CPT

## 2023-07-07 PROCEDURE — 82962 GLUCOSE BLOOD TEST: CPT | Mod: 91

## 2023-07-07 PROCEDURE — 97535 SELF CARE MNGMENT TRAINING: CPT

## 2023-07-07 PROCEDURE — 700102 HCHG RX REV CODE 250 W/ 637 OVERRIDE(OP): Performed by: PHYSICAL MEDICINE & REHABILITATION

## 2023-07-07 PROCEDURE — 770010 HCHG ROOM/CARE - REHAB SEMI PRIVAT*

## 2023-07-07 PROCEDURE — 97530 THERAPEUTIC ACTIVITIES: CPT | Mod: CQ

## 2023-07-07 RX ORDER — INSULIN LISPRO 100 [IU]/ML
2-12 INJECTION, SOLUTION INTRAVENOUS; SUBCUTANEOUS
Status: DISCONTINUED | OUTPATIENT
Start: 2023-07-07 | End: 2023-07-17 | Stop reason: HOSPADM

## 2023-07-07 RX ORDER — DEXTROSE MONOHYDRATE 25 G/50ML
25 INJECTION, SOLUTION INTRAVENOUS
Status: DISCONTINUED | OUTPATIENT
Start: 2023-07-07 | End: 2023-07-17 | Stop reason: HOSPADM

## 2023-07-07 RX ADMIN — ENOXAPARIN SODIUM 40 MG: 100 INJECTION SUBCUTANEOUS at 17:42

## 2023-07-07 RX ADMIN — ASPIRIN 81 MG CHEWABLE TABLET 81 MG: 81 TABLET CHEWABLE at 07:59

## 2023-07-07 RX ADMIN — SIMETHICONE 125 MG: 125 TABLET, CHEWABLE ORAL at 11:37

## 2023-07-07 RX ADMIN — HYDROXYZINE HYDROCHLORIDE 50 MG: 25 TABLET, FILM COATED ORAL at 20:55

## 2023-07-07 RX ADMIN — INSULIN LISPRO 2 UNITS: 100 INJECTION, SOLUTION INTRAVENOUS; SUBCUTANEOUS at 11:38

## 2023-07-07 RX ADMIN — NYSTATIN 500000 UNITS: 100000 SUSPENSION ORAL at 07:58

## 2023-07-07 RX ADMIN — NYSTATIN 500000 UNITS: 100000 SUSPENSION ORAL at 14:49

## 2023-07-07 RX ADMIN — INSULIN LISPRO 2 UNITS: 100 INJECTION, SOLUTION INTRAVENOUS; SUBCUTANEOUS at 17:24

## 2023-07-07 RX ADMIN — NICOTINE TRANSDERMAL SYSTEM 21 MG: 21 PATCH, EXTENDED RELEASE TRANSDERMAL at 05:30

## 2023-07-07 RX ADMIN — SIMETHICONE 125 MG: 125 TABLET, CHEWABLE ORAL at 17:42

## 2023-07-07 RX ADMIN — MOMETASONE FUROATE AND FORMOTEROL FUMARATE DIHYDRATE 2 PUFF: 200; 5 AEROSOL RESPIRATORY (INHALATION) at 20:57

## 2023-07-07 RX ADMIN — MOMETASONE FUROATE AND FORMOTEROL FUMARATE DIHYDRATE 2 PUFF: 200; 5 AEROSOL RESPIRATORY (INHALATION) at 07:56

## 2023-07-07 RX ADMIN — INSULIN LISPRO 4 UNITS: 100 INJECTION, SOLUTION INTRAVENOUS; SUBCUTANEOUS at 21:08

## 2023-07-07 RX ADMIN — ATORVASTATIN CALCIUM 40 MG: 40 TABLET, FILM COATED ORAL at 20:55

## 2023-07-07 RX ADMIN — CLOPIDOGREL BISULFATE 75 MG: 75 TABLET ORAL at 07:59

## 2023-07-07 RX ADMIN — LORATADINE 10 MG: 10 TABLET ORAL at 07:59

## 2023-07-07 RX ADMIN — Medication 3 MG: at 20:55

## 2023-07-07 RX ADMIN — NYSTATIN 500000 UNITS: 100000 SUSPENSION ORAL at 20:55

## 2023-07-07 RX ADMIN — NYSTATIN 500000 UNITS: 100000 SUSPENSION ORAL at 17:42

## 2023-07-07 RX ADMIN — INSULIN LISPRO 2 UNITS: 100 INJECTION, SOLUTION INTRAVENOUS; SUBCUTANEOUS at 07:36

## 2023-07-07 RX ADMIN — SIMETHICONE 125 MG: 125 TABLET, CHEWABLE ORAL at 07:59

## 2023-07-07 RX ADMIN — SENNOSIDES AND DOCUSATE SODIUM 2 TABLET: 50; 8.6 TABLET ORAL at 20:55

## 2023-07-07 SDOH — ECONOMIC STABILITY: TRANSPORTATION INSECURITY
IN THE PAST 12 MONTHS, HAS LACK OF RELIABLE TRANSPORTATION KEPT YOU FROM MEDICAL APPOINTMENTS, MEETINGS, WORK OR FROM GETTING THINGS NEEDED FOR DAILY LIVING?: NO

## 2023-07-07 SDOH — ECONOMIC STABILITY: TRANSPORTATION INSECURITY
IN THE PAST 12 MONTHS, HAS THE LACK OF TRANSPORTATION KEPT YOU FROM MEDICAL APPOINTMENTS OR FROM GETTING MEDICATIONS?: NO

## 2023-07-07 ASSESSMENT — GAIT ASSESSMENTS
GAIT LEVEL OF ASSIST: MINIMAL ASSIST
ASSISTIVE DEVICE: QUAD CANE
DEVIATION: STEP TO;DECREASED BASE OF SUPPORT;SHUFFLED GAIT;DECREASED HEEL STRIKE;DECREASED TOE OFF
DISTANCE (FEET): 120

## 2023-07-07 ASSESSMENT — PAIN DESCRIPTION - PAIN TYPE: TYPE: ACUTE PAIN

## 2023-07-07 ASSESSMENT — ACTIVITIES OF DAILY LIVING (ADL): BED_CHAIR_WHEELCHAIR_TRANSFER_DESCRIPTION: ADAPTIVE EQUIPMENT;SUPERVISION FOR SAFETY;VERBAL CUEING

## 2023-07-07 NOTE — THERAPY
"Physical Therapy   Daily Treatment     Patient Name: Nyasia Schuster  Age:  67 y.o., Sex:  female  Medical Record #: [id number]  Today's Date: 7/7/2023     Precautions  Precautions: Fall Risk  Comments: 2-3L O2 wean, Ziopatch (currently not working)    Subjective    Pt was seen [phone], [phone]. Agreeable to session.     Objective       07/07/23 0931 07/07/23 1301   PT Charge Group   PT Gait Training (Units) 2  --    PT Therapeutic Exercise (Units) 1 1   PT Neuromuscular Re-Education / Balance (Units)  --  1   PT Therapeutic Activities (Units) 1  --    Supervising Physical Therapist Annie Skelton   PT Total Time Spent   PT Individual Total Time Spent (Mins) 60 30   Vitals   Pulse (!) 106  --    Patient BP Position   (post ambulation)  --    Pulse Oximetry 93 %  (recovers to 96 after 1 min)  --    O2 (LPM) 2 2   O2 Delivery Device Silicone Nasal Cannula Silicone Nasal Cannula   Pain   Intervention Declines  --    Cognition    Level of Consciousness Alert Alert   Gait Functional Level of Assist    Gait Level Of Assist Minimal Assist  (to CGA)  --    Assistive Device Quad Cane  (WBQC)  --    Distance (Feet) 120  --    # of Times Distance was Traveled 2  --    Deviation Step To;Decreased Base Of Support;Shuffled Gait;Decreased Heel Strike;Decreased Toe Off  (cues for not advancing QC too far, posture throughout.)  --    Transfer Functional Level of Assist   Bed, Chair, Wheelchair Transfer Minimal Assist  (to CGA)  --    Bed Chair Wheelchair Transfer Description Adaptive equipment;Supervision for safety;Verbal cueing  (WBQC)  --    Sitting Lower Body Exercises   Sitting Lower Body Exercises   (BLE #2)   (green resistance band)   Ankle Pumps 1 set of 15;Bilateral  --    Hip Abduction  --  1 set of 10;Bilateral   Hip Adduction  --  1 set of 10;Bilateral   Long Arc Quad 1 set of 10;Bilateral  --    Marching 1 set of 10  --    Sit to Stand  --  1 set of 10  (1x5 LLE slightly anterior than RLE, 1x5 LLE on 2\" " shower step)   Bed Mobility    Sit to Stand Contact Guard Assist Contact Guard Assist   Neuro-Muscular Treatments   Neuro-Muscular Treatments  --  Facilitation;Postural Facilitation  (forced use during STS)   Interdisciplinary Plan of Care Collaboration   IDT Collaboration with   --  Occupational Therapist   Patient Position at End of Therapy Seated;Call Light within Reach;Tray Table within Reach;Phone within Reach Seated  (transition care to OT)     Discussed personal goals and PT POC.    Assessment    Pt tolerated both sessions well, pt is very motivated and biggest barrier is RUE weakness and impaired endurance. Required verbal cues for controlling descending during STS and QC placement. Will be benefit of trailing SPC or SBQC.    Strengths: Able to follow instructions, Alert and oriented, Good insight into deficits/needs, Independent prior level of function, Manages pain appropriately, Motivated for self care and independence, Pleasant and cooperative, Supportive family, Willingly participates in therapeutic activities  Barriers: Decreased endurance, Fatigue, Hemiparesis, Home accessibility, Impaired activity tolerance, Impaired balance, Limited mobility    Plan    Ambulation w/ LRAD(trail SPC or SBQC), activity tolerance, standing balance, check O2 PRN    Passport items to be completed:  Get in/out of bed safely, in/out of a vehicle, safely use mobility device, walk or wheel around home/community, navigate up and down stairs, show how to get up/down from the ground, ensure home is accessible, demonstrate HEP, complete caregiver training    Physical Therapy Problems (Active)       Problem: Balance       Dates: Start:  07/06/23         Goal: STG-Within one week, patient will tolerate [place] Balance Scale assessment.       Dates: Start:  07/06/23               Problem: Mobility       Dates: Start:  07/06/23         Goal: STG-Within one week, patient will ambulate household distance 150 ft with LRAD and CGA.        Dates: Start:  07/06/23            Goal: STG-Within one week, patient will ambulate up/down flight of stairs with LUE support and SBA-CGA.       Dates: Start:  07/06/23               Problem: Mobility Transfers       Dates: Start:  07/06/23         Goal: STG-Within one week, patient will perform bed mobility independently.       Dates: Start:  07/06/23            Goal: STG-Within one week, patient will transfer bed to chair with LRAD and SBA-CGA.       Dates: Start:  07/06/23               Problem: PT-Long Term Goals       Dates: Start:  07/06/23         Goal: LTG-By discharge, patient will ambulate household distances mod I with LRAD, community distances supervised with LRAD.       Dates: Start:  07/06/23            Goal: LTG-By discharge, patient will transfer one surface to another with LRAD mod I.       Dates: Start:  07/06/23            Goal: LTG-By discharge, patient will ambulate up/down flight of stairs with LUE support and supervision.       Dates: Start:  07/06/23            Goal: LTG-By discharge, patient will transfer in/out of a car with LRAD and supervision.       Dates: Start:  07/06/23

## 2023-07-07 NOTE — DISCHARGE PLANNING
CASE MANAGEMENT INITIAL ASSESSMENT    Admit Date:  7/5/2023     I met with pt to discuss role of case management / discharge planning / team conference.   Patient is a  67 y.o. female transferred from [facility] where she was hospitalized from 7/2 to 7/5.    Diagnosis: Ischemic stroke (HCC) [I63.9]    Co-morbidities:   Patient Active Problem List    Diagnosis Date Noted    Ischemic stroke (HCC) 07/05/2023    Oral thrush 07/05/2023    Urinary retention 07/05/2023    Seasonal allergies 07/05/2023    Acute respiratory failure with hypoxia (HCC) 07/05/2023    Chronic cough 07/05/2023    Tobacco abuse 07/02/2023    Type 2 diabetes mellitus without complication, without long-term current use of insulin (HCC) 07/02/2023    Hemiparesis of right dominant side (HCC) 07/02/2023    Focal neurological deficit 07/02/2023     Prior Living Situation:  Housing / Facility: 2 Story House; bedroom on 1st floor; 5 MONTY; 15 to 2nd floor; bath/shower combo  Lives with - Patient's Self Care Capacity: Adult Children (sister, Lara, lives next door)    Prior Level of Function:  Medication Management: Independent  Finances: Independent  Home Management: Independent  Shopping: Independent  Prior Level Of Mobility: Independent Without Device in Community, Independent With Steps in Community, Independent Without Device in Home, Independent With Steps in Home  Driving / Transportation: Driving Independent    Support Systems:  Primary : Lara Schuster   Other support systems: Son   Advance Directives: No  Power of  (Name & Phone): none    Previous Services Utilized:   Equipment Owned: None  Prior Services: Home-Independent    Other Information:  Occupation (Pre-Hospital Vocational): Retired Due To Age  Primary Payor Source: Medicare A  Primary Care Practitioner : none  Other MDs: neurology    Patient / Family Goal:  Patient / Family Goal: Return home.    Plan:  1. Continue to follow patient through hospitalization and  provide discharge planning in collaboration with patient, family, physicians and ancillary services.     2. Utilize community resources to ensure a safe discharge.     3.  Will need to establish w/ a pcp.

## 2023-07-07 NOTE — THERAPY
Occupational Therapy  Daily Treatment     Patient Name: Nyasia Schuster  Age:  67 y.o., Sex:  female  Medical Record #: [id number]  Today's Date: 7/7/2023     Precautions  Precautions: Fall Risk  Comments: 2-3L O2 wean, Ziopatch (currently not working)         Subjective    Pt seen 2x this day and agreeable to both sessions as listed and detailed below.     Objective       07/07/23 0801 07/07/23 1345   OT Charge Group   OT Electrical Stimulation Attended (Units)  --  1   OT Self Care / ADL (Units) 2  --    OT Therapy Activity (Units) 2 1   OT Total Time Spent   OT Individual Total Time Spent (Mins) 60 30   Pain   Intervention Declines  --    Cognition    Level of Consciousness Alert  --    Passive ROM Upper Body   Comments Initiated HEP education with handout for self-ROM for RUE to maintain ROM/joint integrety and increased blood flow and body awareness. Pt completed 1 set of 5 reps shoulder flexion, abduction, int/ext rot. Elbow flexion. Completed HEP education with handout for self-ROM for RUE to maintain ROM/joint integrety and increased blood flow and body awareness. Pt completed 1 set of 5 reps wrist flexion/ext, pronation/sup, ulnar/rad dev, digital flexion, web space stretch, and opposition.   Functional Level of Assist   Eating Supervision  --    Eating Description Set-up of equipment or meal/tube feeding  (See note for self-feeding eval details.)  --    Bed, Chair, Wheelchair Transfer Minimal Assist  --    Bed Chair Wheelchair Transfer Description Supervision for safety;Verbal cueing;Squat pivot transfer to wheelchair  --    Supine Upper Body Exercises   Supine Upper Body Exercises Yes  --    Chest Press 1 set of 10;Bilateral;Other Resistance (See Comments)  --    Front Arm Raise 1 set of 10;Bilateral;Other Resistance (See Comments)  --    Bicep Curl 1 set of 10;Bilateral;Other Resistance (See Comments)  --    Comments Against gravity  --    Neuro-Muscular Treatments   Neuro-Muscular Treatments  --   Electrical Stimulation   Comments  --  NMES applied over digital flex/ext via [place] for 10 min with good tolerance and SKM activation noted.   Interdisciplinary Plan of Care Collaboration   Patient Position at End of Therapy Seated;Chair Alarm On;Self Releasing Lap Belt Applied;Call Light within Reach;Tray Table within Reach;Phone within Reach Seated;Chair Alarm On;Self Releasing Lap Belt Applied;Tray Table within Reach;Call Light within Reach;Phone within Reach     Self-feeding Assessment during meal:    - Ability to grasp utensils: using L-non-dom hand with european grasp for fork and spoon.    - Ability to bring food to mouth: ind  - Ability to bring liquids to mouth: ind  - Ability to open packages: Min A for opening small tearable packaging, but able to complete peel back using R-hand in supportive role.  - Ability to cut food: Trialed use of rocker knife with T- and gross grasp handles, with T- operation at Mod I level.  - Ability to manage body posture: ind  - Ability to maintain a clean appearance: minimal spilling with cues to slow pace with improved control  - Behavior/socialization skills: ind    - Primary barriers to self-feeding: with use of rocker knife and opening some packages, pt requires setup.  - Adaptive equipment/ Compensatory strategy recommendations: rocker T- knife    Pt seen during breakfast meal in OT Self-feeding evaluation Pt was able to eat approximately 100 % of meal and required setup assist overall. Pt's response to edu/recommendations: Pt agreeable to having a T- rocker knife on all trays. Pt would continue to benefit from setup in order to improve independence and confidence with self-feeding skills.  ?  Initiated education on neuroplasticity and stroke recovery with fair carryover verbalized.    Assessment    Pt tolerated both sessions well with focus on self-feeding, NMRE, and self-ROM. Pt with no notable spasticity or contracture during  self-ranging.  Strengths: Able to follow instructions, Alert and oriented, Effective communication skills, Good carryover of learning, Independent prior level of function, Good insight into deficits/needs, Making steady progress towards goals, Motivated for self care and independence, Pleasant and cooperative, Supportive family, Willingly participates in therapeutic activities  Barriers: Bowel incontinence, Decreased endurance, Fatigue, Generalized weakness, Hemiplegia, Home accessibility, Impaired activity tolerance, Impaired balance, Limited mobility    Plan    ADL retraining using heather-technique, RUE NMRE (, bioness, [place], NMES, mirror therapy), functional transfers, standing balance/endurance.    Passport items to be completed:  Perform bathroom transfers, complete dressing, complete feeding, get ready for the day, prepare a simple meal, participate in household tasks, adapt home for safety needs, demonstrate home exercise program, complete caregiver training     Occupational Therapy Goals (Active)       Problem: Bathing       Dates: Start:  07/06/23         Goal: STG-Within one week, patient will bathe at SBA using DME/AE PRN.       Dates: Start:  07/06/23               Problem: Dressing       Dates: Start:  07/06/23         Goal: STG-Within one week, patient will dress LB at CGA level using DME/AE PRN.       Dates: Start:  07/06/23               Problem: Eating       Dates: Start:  07/06/23         Goal: STG-Within one week, patient will feed self at Mod I level using AE PRN.       Dates: Start:  07/06/23               Problem: Functional Transfers       Dates: Start:  07/06/23         Goal: STG-Within one week, patient will transfer to toilet at SBA level using DME PRN.       Dates: Start:  07/06/23               Problem: Grooming       Dates: Start:  07/06/23         Goal: STG-Within one week, patient will complete grooming at SPV level using AE PRN.       Dates: Start:  07/06/23               Problem:  OT Long Term Goals       Dates: Start:  07/06/23         Goal: LTG-By discharge, patient will complete basic self care tasks at SBA-Mod I level using DME/AE PRN.       Dates: Start:  07/06/23            Goal: LTG-By discharge, patient will perform bathroom transfers at SPV-Mod I level using DME/AE PRN.       Dates: Start:  07/06/23            Goal: LTG-By discharge, patient will complete basic home management at Min A-Mod I level using DME/AE PRN.       Dates: Start:  07/06/23               Problem: Toileting       Dates: Start:  07/06/23         Goal: STG-Within one week, patient will complete toileting tasks at CGA level using DME PRN.       Dates: Start:  07/06/23

## 2023-07-07 NOTE — FLOWSHEET NOTE
07/07/23 0757   Events/Summary/Plan   Events/Summary/Plan SpO2 check, MDI   Vital Signs   Pulse 80   Respiration 18   Pulse Oximetry 97 %   $ Pulse Oximetry (Spot Check) Yes   Respiratory Assessment   Level of Consciousness Alert   Chest Exam   Work Of Breathing / Effort Within Normal Limits   Oxygen   O2 (LPM) 2  (Decrease to 1 lpm)   O2 Delivery Device Nasal Cannula

## 2023-07-07 NOTE — CARE PLAN
Problem: Knowledge Deficit - Standard  Goal: Patient and family/care givers will demonstrate understanding of plan of care, disease process/condition, diagnostic tests and medications  Outcome: Progressing     Problem: Fall Risk - Rehab  Goal: Patient will remain free from falls  Note: Pt uses call light consistently and appropriately. Waits for assistance does not attempt self transfer this shift. Able to verbalize needs. Call light and personal belongings within reach. Will continue to monitor.    The patient is Stable - Low risk of patient condition declining or worsening    Shift Goals  Clinical Goals: Safety, BS management  Patient Goals: Safety, BS management

## 2023-07-07 NOTE — CONSULTS
HOSPITAL MEDICINE CONSULTATION    Requesting Physician:  Dr. Black    Reason for Consult:  Diabetes    History of Present Illness:  The patient is a 67-year-old  female with past medical history significant for medical non-adherence, non-insulin dependent diabetes mellitus, and tobacco use disorder.  She was admitted to [facility] on 7/2/23 for slurred speech and right sided weakness x 2 days.  MR imaging revealed multifocal areas of infarction in the left posterior frontal centrum semiovale and involving several left posterior frontal gyri.  CT angiogram of the head and neck demonstrated to large vessel occlusion.  Echocardiogram showed ejection fraction 65% and right ventricular systolic pressure 30 mmHg.  She was placed on dual antiplatelet and statin therapy.  She also has a ZioPatch cardiac monitor in place.  Due to her ongoing functional debility, the patient was transferred to [facility] on 7/5/23.  Hospital Medicine consultation is requested to assist in the management of this patient's uncontrolled diabetes with hemoglobin A1c of 12.6.    Review of Systems:  Review of Systems   Constitutional:  Negative for chills and fever.   HENT: Negative.     Eyes: Negative.    Respiratory:  Negative for cough and shortness of breath.    Cardiovascular:  Negative for chest pain and palpitations.   Gastrointestinal:  Negative for abdominal pain, nausea and vomiting.   Genitourinary: Negative.    Musculoskeletal: Negative.    Skin:  Negative for itching and rash.   Neurological:  Positive for sensory change and focal weakness.   Endo/Heme/Allergies:  Negative for polydipsia. Does not bruise/bleed easily.   All other systems reviewed and are negative.      Allergies:  No Known Allergies    Medications:    Current Facility-Administered Medications:     metFORMIN (Glucophage) tablet 500 mg, 500 mg, Oral, BID WITH MEALS, Leydi Estrada M.D., 500 mg at 07/09/23 0810     [DISCONTINUED] insulin GLARGINE (Lantus,Semglee) injection, 0.2 Units/kg/day, Subcutaneous, Q EVENING **AND** insulin lispro (HumaLOG,AdmeLOG) injection, 2-12 Units, Subcutaneous, 4X/DAY ACHS, 2 Units at 07/09/23 1119 **AND** POC blood glucose manual result, , , Q AC AND BEDTIME(S) **AND** NOTIFY MD and PharmD, , , Once **AND** Administer 20 grams of glucose (approximately 8 ounces of fruit juice) every 15 minutes PRN FSBG less than 70 mg/dL, , , PRN **AND** dextrose 50% (D50W) injection 25 g, 25 g, Intravenous, Q15 MIN PRN, Leydi Estrada M.D.    insulin GLARGINE (Lantus,Semglee) injection, 18 Units, Subcutaneous, QAM INSULIN, Leydi Estrada M.D., 18 Units at 07/09/23 0752    aspirin (Asa) chewable tab 81 mg, 81 mg, Oral, DAILY, Unique Black M.D., 81 mg at 07/09/23 0810    hydrOXYzine HCl (Atarax) tablet 50 mg, 50 mg, Oral, Q6HRS PRN, Unique Black M.D., 50 mg at 07/07/23 2055    melatonin tablet 3 mg, 3 mg, Oral, HS PRN, Unique Black M.D., 3 mg at 07/07/23 2055    Respiratory Therapy Consult, , Nebulization, Continuous RT, Unique Black M.D.    acetaminophen (Tylenol) tablet 650 mg, 650 mg, Oral, Q4HRS PRN, Unique Black M.D., 650 mg at 07/05/23 2042    lactulose 20 GM/30ML solution 30 mL, 30 mL, Oral, QDAY PRN, Unique Black M.D.    docusate sodium (Enemeez) enema 283 mg, 283 mg, Rectal, QDAY PRN, Unique Black M.D.    carboxymethylcellulose (Refresh Tears) 0.5 % ophthalmic drops 1 Drop, 1 Drop, Both Eyes, PRN, Unique Black M.D.    benzocaine-menthol (Cepacol) lozenge 1 Lozenge, 1 Lozenge, Mouth/Throat, Q2HRS PRN, Unique Black M.D.    mag hydrox-al hydrox-simeth (Maalox Plus Es Or Mylanta Ds) suspension 20 mL, 20 mL, Oral, Q2HRS PRN, Unique Black M.D.    ondansetron (Zofran ODT) dispertab 4 mg, 4 mg, Oral, 4X/DAY PRN **OR** ondansetron (Zofran) syringe/vial injection 4 mg, 4 mg, Intramuscular, 4X/DAY PRN, Unique Black M.D.    traZODone (Desyrel) tablet 50 mg, 50  mg, Oral, QHS PRN, Unique Black M.D.    sodium chloride (Ocean) 0.65 % nasal spray 2 Spray, 2 Spray, Nasal, PRN, Unique Black M.D.    midazolam (VERSED) 5 mg/mL (1 mL vial), 5 mg, Nasal, PRN, Unique Black M.D.    hydrALAZINE (Apresoline) tablet 10 mg, 10 mg, Oral, Q8HRS PRN, Unique Black M.D.    hydrALAZINE (Apresoline) tablet 25 mg, 25 mg, Oral, Q8HRS PRN, Unique Black M.D.    atorvastatin (Lipitor) tablet 40 mg, 40 mg, Oral, Q EVENING, Unique Black M.D., 40 mg at 07/08/23 2010    clopidogrel (Plavix) tablet 75 mg, 75 mg, Oral, DAILY, Unique Black M.D., 75 mg at 07/09/23 0810    enoxaparin (Lovenox) inj 40 mg, 40 mg, Subcutaneous, DAILY AT 1800, Unique Black M.D., 40 mg at 07/08/23 1716    lidocaine (Lidoderm) 5 % 2 Patch, 2 Patch, Transdermal, DAILY, Unique Black M.D.    nicotine (Nicoderm) 21 MG/24HR 21 mg, 21 mg, Transdermal, Daily-0600, 21 mg at 07/09/23 0501 **AND** Nicotine Replacement Patient Education Materials, , , Once **AND** nicotine polacrilex (Nicorette) 2 MG piece 2 mg, 2 mg, Oral, Q HOUR PRN, Unique Black M.D.    senna-docusate (Pericolace Or Senokot S) 8.6-50 MG per tablet 2 Tablet, 2 Tablet, Oral, BID, 2 Tablet at 07/08/23 2010 **AND** polyethylene glycol/lytes (Miralax) PACKET 1 Packet, 1 Packet, Oral, QDAY PRN, 1 Packet at 07/09/23 0501 **AND** magnesium hydroxide (Milk Of Magnesia) suspension 30 mL, 30 mL, Oral, QDAY PRN **AND** bisacodyl (Dulcolax) suppository 10 mg, 10 mg, Rectal, QDAY PRN, Unique Black M.D.    nystatin (Mycostatin) 351221 UNIT/ML suspension 500,000 Units, 5 mL, Swish & Spit, 4X/DAY, Unique Black M.D., 500,000 Units at 07/09/23 1123    mometasone-formoterol (Dulera) 200-5 MCG/ACT inhaler 2 Puff, 2 Puff, Inhalation, BID (RT), Unique Black M.D., 2 Puff at 07/09/23 0947    simethicone (Mylicon) chewable tablet 125 mg, 125 mg, Oral, TID WITH MEALS, Unique Black M.D., 125 mg at 07/09/23 1123     loratadine (Claritin) tablet 10 mg, 10 mg, Oral, DAILY, Unique Black M.D., 10 mg at 07/09/23 0810    Past Medical/Surgical History:  Past Medical History:   Diagnosis Date    Carpal tunnel syndrome on both sides     Diabetes (HCC)     Finger injury     3 yr old    Sinusitis     Tobacco abuse      Past Surgical History:   Procedure Laterality Date    CARPAL TUNNEL RELEASE Bilateral     OTHER SURGICAL PROCEDURE      nasal septum repair    SINUSCOPY         Social History:  Social History     Socioeconomic History    Marital status: Single     Spouse name: Not on file    Number of children: Not on file    Years of education: Not on file    Highest education level: Not on file   Occupational History    Not on file   Tobacco Use    Smoking status: Every Day     Packs/day: 2.00     Types: Cigarettes    Smokeless tobacco: Never   Vaping Use    Vaping Use: Never used   Substance and Sexual Activity    Alcohol use: Not Currently    Drug use: Not Currently    Sexual activity: Not on file   Other Topics Concern    Not on file   Social History Narrative    Not on file     Social Determinants of Health     Financial Resource Strain: Not on file   Food Insecurity: Not on file   Transportation Needs: No Transportation Needs (7/7/2023)    PRAPARE - Transportation     Lack of Transportation (Medical): No     Lack of Transportation (Non-Medical): No   Physical Activity: Not on file   Stress: Not on file   Social Connections: Not on file   Intimate Partner Violence: Not on file   Housing Stability: Not on file       Family History:  Family History   Problem Relation Age of Onset    Parkinson's Disease Mother     Dementia Mother     Heart Disease Father     Prostate cancer Brother        Physical Examination:   Vitals:    07/08/23 0914 07/08/23 1927 07/09/23 0700 07/09/23 0948   BP:  (!) 139/90 (!) 141/73    Pulse: 83 89 87 96   Resp: 16 18 18 18   Temp:  36.8 °C (98.3 °F) 36.6 °C (97.9 °F)    TempSrc:  Oral Oral    SpO2: 93% 91%  93% 93%   Weight:       Height:           Physical Exam  Vitals reviewed.   Constitutional:       General: She is not in acute distress.     Appearance: Normal appearance. She is not ill-appearing.   HENT:      Head: Normocephalic and atraumatic.      Right Ear: External ear normal.      Left Ear: External ear normal.      Nose: Nose normal.      Mouth/Throat:      Pharynx: Oropharynx is clear.   Eyes:      General:         Right eye: No discharge.         Left eye: No discharge.      Extraocular Movements: Extraocular movements intact.      Conjunctiva/sclera: Conjunctivae normal.   Cardiovascular:      Rate and Rhythm: Normal rate and regular rhythm.   Pulmonary:      Effort: Pulmonary effort is normal. No respiratory distress.      Breath sounds: Normal breath sounds. No wheezing.   Abdominal:      General: Bowel sounds are normal. There is no distension.      Palpations: Abdomen is soft.      Tenderness: There is no abdominal tenderness.   Musculoskeletal:      Cervical back: Normal range of motion and neck supple.      Right lower leg: No edema.      Left lower leg: No edema.   Skin:     General: Skin is warm and dry.   Neurological:      Mental Status: She is alert and oriented to person, place, and time.         Laboratory Data:  Recent Labs     07/08/23  0512   WBC 8.1   RBC 5.21   HEMOGLOBIN 15.9   HEMATOCRIT 47.0   MCV 90.2   MCH 30.5   MCHC 33.8   RDW 39.3   PLATELETCT 172   MPV 11.1     Recent Labs     07/08/23  0512   SODIUM 140   POTASSIUM 4.0   CHLORIDE 102   CO2 26   GLUCOSE 164*   BUN 8   CREATININE 0.51   CALCIUM 9.4           Impressions/Recommendations:  Ischemic stroke (HCC)  Presented w/ dysarthria and R HP  On ASA, Plavix, and Lipitor  Also has ZioPatch  Management per Physiatry    Tobacco abuse  On Dulera  RT protocol    Type 2 diabetes mellitus without complication, without long-term current use of insulin (HCC)  HbA1c 12.6  Increase Lantus and change to qam dosing  Adjust SSI  Observe serum  glucose trends    Full Code    Thank you for the opportunity to assist in this patient's care.  We will continue to follow along with you.

## 2023-07-07 NOTE — PROGRESS NOTES
Physical Medicine & Rehabilitation Progress Note    Encounter Date: 7/7/2023    Chief Complaint: Decreased mobility, weakness    Interval Events (Subjective):  Patient sitting up in wheelchair. She reports she is in a good mood. She reports her blood sugars were well controlled at [facility] but worse here. She reports otherwise therapy is going well. Denies NVD    Objective:  VITAL SIGNS: /67   Pulse 80   Temp 36.5 °C (97.7 °F) (Oral)   Resp 18   Ht 1.524 m (5')   Wt 79.8 kg (176 lb)   SpO2 97%   BMI 34.37 kg/m²   Gen: NAD  Psych: Mood and affect appropriate  CV: RRR, 0 edema  Resp: CTAB, no upper airway sounds  Abd: NTND  Neuro: AOx4, following commands    Laboratory Values:  Recent Results (from the past 72 hour(s))   POCT glucose device results    Collection Time: 07/04/23  5:26 PM   Result Value Ref Range    POC Glucose, Blood 162 (H) 65 - 99 mg/dL   POCT glucose device results    Collection Time: 07/04/23  8:32 PM   Result Value Ref Range    POC Glucose, Blood 198 (H) 65 - 99 mg/dL   POCT glucose device results    Collection Time: 07/05/23  8:03 AM   Result Value Ref Range    POC Glucose, Blood 133 (H) 65 - 99 mg/dL   CoV-2, Flu A/B, And RSV by PCR (Cursogram)    Collection Time: 07/05/23  1:15 PM    Specimen: Respirate   Result Value Ref Range    Influenza virus A RNA Negative Negative    Influenza virus B, PCR Negative Negative    RSV, PCR Negative Negative    SARS-CoV-2 by PCR NotDetected     SARS-CoV-2 Source NP Swab    POCT glucose device results    Collection Time: 07/05/23  3:50 PM   Result Value Ref Range    POC Glucose, Blood 231 (H) 65 - 99 mg/dL   POCT glucose device results    Collection Time: 07/05/23  5:39 PM   Result Value Ref Range    POC Glucose, Blood 159 (H) 65 - 99 mg/dL   POCT glucose device results    Collection Time: 07/05/23  8:36 PM   Result Value Ref Range    POC Glucose, Blood 233 (H) 65 - 99 mg/dL   CBC with Differential    Collection Time: 07/06/23  5:35 AM   Result Value  Ref Range    WBC 9.2 4.8 - 10.8 K/uL    RBC 5.38 4.20 - 5.40 M/uL    Hemoglobin 16.4 (H) 12.0 - 16.0 g/dL    Hematocrit 48.1 (H) 37.0 - 47.0 %    MCV 89.4 81.4 - 97.8 fL    MCH 30.5 27.0 - 33.0 pg    MCHC 34.1 32.2 - 35.5 g/dL    RDW 39.2 35.9 - 50.0 fL    Platelet Count 180 164 - 446 K/uL    MPV 11.1 9.0 - 12.9 fL    Neutrophils-Polys 63.50 44.00 - 72.00 %    Lymphocytes 24.90 22.00 - 41.00 %    Monocytes 8.00 0.00 - 13.40 %    Eosinophils 2.50 0.00 - 6.90 %    Basophils 0.70 0.00 - 1.80 %    Immature Granulocytes 0.40 0.00 - 0.90 %    Nucleated RBC 0.00 0.00 - 0.20 /100 WBC    Neutrophils (Absolute) 5.81 1.82 - 7.42 K/uL    Lymphs (Absolute) 2.28 1.00 - 4.80 K/uL    Monos (Absolute) 0.73 0.00 - 0.85 K/uL    Eos (Absolute) 0.23 0.00 - 0.51 K/uL    Baso (Absolute) 0.06 0.00 - 0.12 K/uL    Immature Granulocytes (abs) 0.04 0.00 - 0.11 K/uL    NRBC (Absolute) 0.00 K/uL   Comp Metabolic Panel (CMP)    Collection Time: 07/06/23  5:35 AM   Result Value Ref Range    Sodium 142 135 - 145 mmol/L    Potassium 3.9 3.6 - 5.5 mmol/L    Chloride 105 96 - 112 mmol/L    Co2 25 20 - 33 mmol/L    Anion Gap 12.0 7.0 - 16.0    Glucose 147 (H) 65 - 99 mg/dL    Bun 7 (L) 8 - 22 mg/dL    Creatinine 0.43 (L) 0.50 - 1.40 mg/dL    Calcium 9.5 8.5 - 10.5 mg/dL    AST(SGOT) 22 12 - 45 U/L    ALT(SGPT) 19 2 - 50 U/L    Alkaline Phosphatase 67 30 - 99 U/L    Total Bilirubin 0.5 0.1 - 1.5 mg/dL    Albumin 3.7 3.2 - 4.9 g/dL    Total Protein 6.9 6.0 - 8.2 g/dL    Globulin 3.2 1.9 - 3.5 g/dL    A-G Ratio 1.2 g/dL   Magnesium    Collection Time: 07/06/23  5:35 AM   Result Value Ref Range    Magnesium 1.7 1.5 - 2.5 mg/dL   CORRECTED CALCIUM    Collection Time: 07/06/23  5:35 AM   Result Value Ref Range    Correct Calcium 9.7 8.5 - 10.5 mg/dL   ESTIMATED GFR    Collection Time: 07/06/23  5:35 AM   Result Value Ref Range    GFR (CKD-EPI) 105 >60 mL/min/1.73 m 2   POCT glucose device results    Collection Time: 07/06/23  7:37 AM   Result Value Ref Range     POC Glucose, Blood 179 (H) 65 - 99 mg/dL   POCT glucose device results    Collection Time: 07/06/23 11:29 AM   Result Value Ref Range    POC Glucose, Blood 197 (H) 65 - 99 mg/dL   POCT glucose device results    Collection Time: 07/06/23  5:18 PM   Result Value Ref Range    POC Glucose, Blood 165 (H) 65 - 99 mg/dL   POCT glucose device results    Collection Time: 07/06/23  9:04 PM   Result Value Ref Range    POC Glucose, Blood 258 (H) 65 - 99 mg/dL   POCT glucose device results    Collection Time: 07/07/23  7:36 AM   Result Value Ref Range    POC Glucose, Blood 168 (H) 65 - 99 mg/dL   POCT glucose device results    Collection Time: 07/07/23 11:20 AM   Result Value Ref Range    POC Glucose, Blood 179 (H) 65 - 99 mg/dL       Medications:  Scheduled Medications   Medication Dose Frequency    aspirin  81 mg DAILY    atorvastatin  40 mg Q EVENING    clopidogrel  75 mg DAILY    enoxaparin (LOVENOX) injection  40 mg DAILY AT 1800    insulin GLARGINE  0.2 Units/kg/day Q EVENING    And    insulin lispro  2-9 Units 4X/DAY ACHS    lidocaine  2 Patch DAILY    nicotine  21 mg Daily-0600    senna-docusate  2 Tablet BID    nystatin  5 mL 4X/DAY    mometasone-formoterol  2 Puff BID (RT)    simethicone  125 mg TID WITH MEALS    loratadine  10 mg DAILY     PRN medications: hydrOXYzine HCl, melatonin, Respiratory Therapy Consult, acetaminophen, lactulose, docusate sodium, carboxymethylcellulose, benzocaine-menthol, mag hydrox-al hydrox-simeth, ondansetron **OR** ondansetron, traZODone, sodium chloride, midazolam, hydrALAZINE, hydrALAZINE, insulin GLARGINE **AND** insulin lispro **AND** POC blood glucose manual result **AND** NOTIFY MD and PharmD **AND** Administer 20 grams of glucose (approximately 8 ounces of fruit juice) every 15 minutes PRN FSBG less than 70 mg/dL **AND** dextrose bolus, nicotine **AND** Nicotine Replacement Patient Education Materials **AND** nicotine polacrilex, senna-docusate **AND** polyethylene glycol/lytes  **AND** magnesium hydroxide **AND** bisacodyl    Diet:  Current Diet Order   Procedures    Diet Order Diet: Level 7 - Easy to Chew (meds whole with thins, Please add a T- rocker knife to all plates.); Liquid level: Level 0 - Thin; Second Modifier: (optional): Consistent CHO (Diabetic)       Medical Decision Making and Plan:  Adapted from Dr. Black's A&P  Left MCA stroke  Right hemiparesis  PT/OT, 1.5 hr each discipline, 5 days per week   Continue ASA and Plavix  Continue aspirin and Plavix to complete 10 days dual antiplatelet then Plavix alone     Continue atorvastatin     Patient has cardiac monitor in place     Diabetes with hyperglycemia  Continue glargine and sliding scale insulin  Appreciate hospitalist assistance. Continue Lantus 15 U     Tobacco abuse  Continue nicotine patch and gum  Will need counseling     Oral thrush  Continue nystatin     Seasonal allergies  Continue Claritin  Flonase no longer on formulary     Respiratory failure with hypoxia  Likely due to undiagnosed COPD  Continue Dulera  Titrate oxygen     Abdominal gas  Continue simethicone     Constipation  Resolved  Last bowel movement 7/5     DVT prophylaxis  Continue Lovenox  ____________________________________    T. Chilo Egan MD/PhD  ABP - Physical Medicine & Rehabilitation   [facility] - Brain Injury Medicine   ____________________________________

## 2023-07-07 NOTE — CARE PLAN
"The patient is Stable - Low risk of patient condition declining or worsening    Shift Goals  Clinical Goals: safety, BS management  Patient Goals: safety, BS management    Problem: Fall Risk - Rehab  Goal: Patient will remain free from falls  Outcome: Progressing  Note: Deanne Cheng Fall risk Assessment Score: 11    Moderate fall risk Interventions  - Bed and strip alarm   - Yellow sign by the door   - Yellow wrist band \"Fall risk\"  - Room near to the nurse station  - Do not leave patient unattended in the bathroom  - Fall risk education provided    Patient uses call light consistently and appropriately this shift.  Waits for assistance when needed and does not attempt self transfer.  Able to verbalize needs.  Will continue to monitor.            Problem: Pain - Standard  Goal: Alleviation of pain or a reduction in pain to the patient’s comfort goal  Outcome: Progressing  Note: Patient able to verbalize pain level and verbalize an acceptable level of pain.      "

## 2023-07-07 NOTE — PROGRESS NOTES
NURSING DAILY NOTE    Name: Nyasia Schuster   Date of Admission: 7/5/2023   Admitting Diagnosis: Ischemic stroke (HCC)  Attending Physician: Unique Black M.d.  Allergies: Patient has no known allergies.    Safety  Patient Assist  MIN  Patient Precautions  Fall Risk  Precaution Comments  2-3L O2 wean, Ziopatch (currently not working)  Bed Transfer Status  Minimal Assist  Toilet Transfer Status   Minimal Assist  Assistive Devices  Rails, Wheelchair  Oxygen  Silicone Nasal Cannula  Diet/Therapeutic Dining  Current Diet Order   Procedures    Diet Order Diet: Level 7 - Easy to Chew (meds whole with thins); Liquid level: Level 0 - Thin; Second Modifier: (optional): Consistent CHO (Diabetic)     Pill Administration  whole  Agitated Behavioral Scale  14  ABS Level of Severity  No Agitation    Fall Risk  Has the patient had a fall this admission?   No  Deanne Cheng Fall Risk Scoring  11, MODERATE RISK  Fall Risk Safety Measures  bed alarm and chair alarm    Vitals  Temperature: 36.8 °C (98.2 °F)  Temp src: Oral  Pulse: 96  Respiration: 18  Blood Pressure : 137/70  Blood Pressure MAP (Calculated): 92 MM HG  BP Location: Right, Upper Arm  Patient BP Position: Sitting     Oxygen  Pulse Oximetry: 94 %  O2 (LPM): 2  O2 Delivery Device: Silicone Nasal Cannula  Incentive Spirometer Volume: 1000 mL    Bowel and Bladder  Last Bowel Movement  07/06/23  Stool Type  Type 6: Fluffy pieces with ragged edges, a mushy stool  Bowel Device  Bathroom  Continent  Bladder: Continent void   Bowel: Continent movement  Bladder Function  Urine Void (mL):  (moderate)  Number of Times Voided: 1  Urine Color: Unable To Evaluate  Number of Times Incontinent of Urine: 0  Genitourinary Assessment   Bladder Assessment (WDL):  Within Defined Limits  Urine Color: Unable To Evaluate  Number of Bladder Accidents: 0  Total Number of Bladder of Accidents in Last 7 Days: 0  Number of Times  Incontinent of Urine: 0  Bladder Device: Bathroom  Bladder Scan: Post Void  $ Bladder Scan Results (mL): 82    Skin  Parveen Score   18  Sensory Interventions   Skin Preventative Measures: Pillows in Use for Support / Positioning  Moisture Interventions         Pain  Pain Rating Scale  0 - No Pain  Pain Location  Head  Pain Location Orientation     Pain Interventions   Rest    ADLs    Bathing   Shower, Staff (OT)  Linen Change      Personal Hygiene     Chlorhexidine Bath      Oral Care  Brushed Teeth  Teeth/Dentures     Shave     Nutrition Percentage Eaten  Dinner, Between % Consumed  Environmental Precautions  Bed in Low Position, Treaded Slipper Socks on Patient  Patient Turns/Positioning  Patient Turns Self from Side to Side  Patient Turns Assistance/Tolerance     Bed Positions  Bed Controls On  Head of Bed Elevated  Self regulated      Psychosocial/Neurologic Assessment  Psychosocial Assessment  Psychosocial (WDL):  Within Defined Limits  Neurologic Assessment  Neuro (WDL): Exceptions to WDL  Level of Consciousness: Alert  Orientation Level: Oriented X4  Cognition: Appropriate judgement, Appropriate safety awareness, Appropriate attention/concentration, Appropriate for developmental age  Speech: Clear  Pupil Assesment: Yes  R Pupil Size (mm): 3  R Pupil Shape / Description: Round  R Pupil Reaction: Brisk  L Pupil Size (mm): 3  L Pupil Shape / Description: Round  L Pupil Reaction: Brisk  Motor Function/Sensation Assessment: Sensation  RUE Sensation: Full sensation  Muscle Strength Right Arm: Fair Strength against Gravity but No Resistance  LUE Sensation: Full sensation  RLE Sensation: Full sensation  Muscle Strength Right Leg: Fair Strength against Gravity but No Resistance  LLE Sensation: Full sensation  EENT (WDL):  WDL Except (Nystatin)    Cardio/Pulmonary Assessment  Edema      Respiratory Breath Sounds  RUL Breath Sounds: Diminished  RML Breath Sounds: Diminished  RLL Breath Sounds: Diminished  OPAL  Breath Sounds: Diminished  LLL Breath Sounds: Diminished  Cardiac Assessment   Cardiac (WDL):  WDL Except (Zio patch placed 7/5/23)

## 2023-07-08 ENCOUNTER — APPOINTMENT (OUTPATIENT)
Dept: PHYSICAL THERAPY | Facility: REHABILITATION | Age: 68
DRG: 056 | End: 2023-07-08
Attending: PHYSICAL MEDICINE & REHABILITATION
Payer: MEDICARE

## 2023-07-08 ENCOUNTER — APPOINTMENT (OUTPATIENT)
Dept: OCCUPATIONAL THERAPY | Facility: REHABILITATION | Age: 68
DRG: 056 | End: 2023-07-08
Attending: PHYSICAL MEDICINE & REHABILITATION
Payer: MEDICARE

## 2023-07-08 LAB
ANION GAP SERPL CALC-SCNC: 12 MMOL/L (ref 7–16)
BUN SERPL-MCNC: 8 MG/DL (ref 8–22)
CALCIUM SERPL-MCNC: 9.4 MG/DL (ref 8.5–10.5)
CHLORIDE SERPL-SCNC: 102 MMOL/L (ref 96–112)
CO2 SERPL-SCNC: 26 MMOL/L (ref 20–33)
CREAT SERPL-MCNC: 0.51 MG/DL (ref 0.5–1.4)
ERYTHROCYTE [DISTWIDTH] IN BLOOD BY AUTOMATED COUNT: 39.3 FL (ref 35.9–50)
GFR SERPLBLD CREATININE-BSD FMLA CKD-EPI: 101 ML/MIN/1.73 M 2
GLUCOSE BLD STRIP.AUTO-MCNC: 117 MG/DL (ref 65–99)
GLUCOSE BLD STRIP.AUTO-MCNC: 184 MG/DL (ref 65–99)
GLUCOSE BLD STRIP.AUTO-MCNC: 189 MG/DL (ref 65–99)
GLUCOSE BLD STRIP.AUTO-MCNC: 230 MG/DL (ref 65–99)
GLUCOSE SERPL-MCNC: 164 MG/DL (ref 65–99)
HCT VFR BLD AUTO: 47 % (ref 37–47)
HGB BLD-MCNC: 15.9 G/DL (ref 12–16)
MCH RBC QN AUTO: 30.5 PG (ref 27–33)
MCHC RBC AUTO-ENTMCNC: 33.8 G/DL (ref 32.2–35.5)
MCV RBC AUTO: 90.2 FL (ref 81.4–97.8)
PHOSPHATE SERPL-MCNC: 3.9 MG/DL (ref 2.5–4.5)
PLATELET # BLD AUTO: 172 K/UL (ref 164–446)
PMV BLD AUTO: 11.1 FL (ref 9–12.9)
POTASSIUM SERPL-SCNC: 4 MMOL/L (ref 3.6–5.5)
RBC # BLD AUTO: 5.21 M/UL (ref 4.2–5.4)
SODIUM SERPL-SCNC: 140 MMOL/L (ref 135–145)
WBC # BLD AUTO: 8.1 K/UL (ref 4.8–10.8)

## 2023-07-08 PROCEDURE — 97032 APPL MODALITY 1+ESTIM EA 15: CPT

## 2023-07-08 PROCEDURE — 97116 GAIT TRAINING THERAPY: CPT

## 2023-07-08 PROCEDURE — A9270 NON-COVERED ITEM OR SERVICE: HCPCS | Performed by: PHYSICAL MEDICINE & REHABILITATION

## 2023-07-08 PROCEDURE — 97112 NEUROMUSCULAR REEDUCATION: CPT

## 2023-07-08 PROCEDURE — A9270 NON-COVERED ITEM OR SERVICE: HCPCS | Performed by: HOSPITALIST

## 2023-07-08 PROCEDURE — 700111 HCHG RX REV CODE 636 W/ 250 OVERRIDE (IP): Mod: JZ | Performed by: PHYSICAL MEDICINE & REHABILITATION

## 2023-07-08 PROCEDURE — 36415 COLL VENOUS BLD VENIPUNCTURE: CPT

## 2023-07-08 PROCEDURE — 97530 THERAPEUTIC ACTIVITIES: CPT

## 2023-07-08 PROCEDURE — 85027 COMPLETE CBC AUTOMATED: CPT

## 2023-07-08 PROCEDURE — 94760 N-INVAS EAR/PLS OXIMETRY 1: CPT

## 2023-07-08 PROCEDURE — 84100 ASSAY OF PHOSPHORUS: CPT

## 2023-07-08 PROCEDURE — 97535 SELF CARE MNGMENT TRAINING: CPT

## 2023-07-08 PROCEDURE — 700102 HCHG RX REV CODE 250 W/ 637 OVERRIDE(OP): Performed by: PHYSICAL MEDICINE & REHABILITATION

## 2023-07-08 PROCEDURE — 94640 AIRWAY INHALATION TREATMENT: CPT

## 2023-07-08 PROCEDURE — 700102 HCHG RX REV CODE 250 W/ 637 OVERRIDE(OP): Performed by: HOSPITALIST

## 2023-07-08 PROCEDURE — 770010 HCHG ROOM/CARE - REHAB SEMI PRIVAT*

## 2023-07-08 PROCEDURE — 82962 GLUCOSE BLOOD TEST: CPT

## 2023-07-08 PROCEDURE — 99232 SBSQ HOSP IP/OBS MODERATE 35: CPT | Performed by: HOSPITALIST

## 2023-07-08 PROCEDURE — 80048 BASIC METABOLIC PNL TOTAL CA: CPT

## 2023-07-08 RX ADMIN — SENNOSIDES AND DOCUSATE SODIUM 2 TABLET: 50; 8.6 TABLET ORAL at 20:10

## 2023-07-08 RX ADMIN — NYSTATIN 500000 UNITS: 100000 SUSPENSION ORAL at 12:52

## 2023-07-08 RX ADMIN — NYSTATIN 500000 UNITS: 100000 SUSPENSION ORAL at 07:51

## 2023-07-08 RX ADMIN — NICOTINE TRANSDERMAL SYSTEM 21 MG: 21 PATCH, EXTENDED RELEASE TRANSDERMAL at 05:58

## 2023-07-08 RX ADMIN — INSULIN LISPRO 2 UNITS: 100 INJECTION, SOLUTION INTRAVENOUS; SUBCUTANEOUS at 07:48

## 2023-07-08 RX ADMIN — METFORMIN HYDROCHLORIDE 500 MG: 500 TABLET ORAL at 17:17

## 2023-07-08 RX ADMIN — CLOPIDOGREL BISULFATE 75 MG: 75 TABLET ORAL at 07:43

## 2023-07-08 RX ADMIN — ASPIRIN 81 MG CHEWABLE TABLET 81 MG: 81 TABLET CHEWABLE at 07:44

## 2023-07-08 RX ADMIN — SIMETHICONE 125 MG: 125 TABLET, CHEWABLE ORAL at 07:44

## 2023-07-08 RX ADMIN — NYSTATIN 500000 UNITS: 100000 SUSPENSION ORAL at 20:10

## 2023-07-08 RX ADMIN — MOMETASONE FUROATE AND FORMOTEROL FUMARATE DIHYDRATE 2 PUFF: 200; 5 AEROSOL RESPIRATORY (INHALATION) at 20:10

## 2023-07-08 RX ADMIN — INSULIN LISPRO 4 UNITS: 100 INJECTION, SOLUTION INTRAVENOUS; SUBCUTANEOUS at 11:59

## 2023-07-08 RX ADMIN — ENOXAPARIN SODIUM 40 MG: 100 INJECTION SUBCUTANEOUS at 17:16

## 2023-07-08 RX ADMIN — INSULIN LISPRO 2 UNITS: 100 INJECTION, SOLUTION INTRAVENOUS; SUBCUTANEOUS at 21:14

## 2023-07-08 RX ADMIN — NYSTATIN 500000 UNITS: 100000 SUSPENSION ORAL at 17:17

## 2023-07-08 RX ADMIN — ATORVASTATIN CALCIUM 40 MG: 40 TABLET, FILM COATED ORAL at 20:10

## 2023-07-08 RX ADMIN — SIMETHICONE 125 MG: 125 TABLET, CHEWABLE ORAL at 17:17

## 2023-07-08 RX ADMIN — MOMETASONE FUROATE AND FORMOTEROL FUMARATE DIHYDRATE 2 PUFF: 200; 5 AEROSOL RESPIRATORY (INHALATION) at 09:13

## 2023-07-08 RX ADMIN — LORATADINE 10 MG: 10 TABLET ORAL at 07:44

## 2023-07-08 ASSESSMENT — GAIT ASSESSMENTS
ASSISTIVE DEVICE: SINGLE POINT CANE
ASSISTIVE DEVICE: QUAD CANE
GAIT LEVEL OF ASSIST: CONTACT GUARD ASSIST
GAIT LEVEL OF ASSIST: CONTACT GUARD ASSIST
DISTANCE (FEET): 100
DISTANCE (FEET): 100

## 2023-07-08 ASSESSMENT — PATIENT HEALTH QUESTIONNAIRE - PHQ9
2. FEELING DOWN, DEPRESSED, IRRITABLE, OR HOPELESS: NOT AT ALL
1. LITTLE INTEREST OR PLEASURE IN DOING THINGS: NOT AT ALL
SUM OF ALL RESPONSES TO PHQ9 QUESTIONS 1 AND 2: 0

## 2023-07-08 ASSESSMENT — PAIN DESCRIPTION - PAIN TYPE
TYPE: ACUTE PAIN
TYPE: ACUTE PAIN

## 2023-07-08 NOTE — PROGRESS NOTES
NURSING DAILY NOTE    Name: Nyasia Schuster   Date of Admission: 7/5/2023   Admitting Diagnosis: Ischemic stroke (HCC)  Attending Physician: Unique Black M.d.  Allergies: Patient has no known allergies.    Safety  Patient Assist  MIN  Patient Precautions  Fall Risk  Precaution Comments  2-3L O2 wean, Ziopatch (currently not working)  Bed Transfer Status  Minimal Assist (to CGA)  Toilet Transfer Status   Minimal Assist  Assistive Devices  Wheelchair  Oxygen  Silicone Nasal Cannula  Diet/Therapeutic Dining  Current Diet Order   Procedures    Diet Order Diet: Level 7 - Easy to Chew (meds whole with thins, Please add a T- rocker knife to all plates.); Liquid level: Level 0 - Thin; Second Modifier: (optional): Consistent CHO (Diabetic)     Pill Administration  {RNMEDSDELIVERY:94705}  Agitated Behavioral Scale  16  ABS Level of Severity  No Agitation    Fall Risk  Has the patient had a fall this admission?   No  Deanne Cheng Fall Risk Scoring  14, MODERATE RISK  Fall Risk Safety Measures  {RNSafety & Alarms:32200}    Vitals  Temperature: 36.9 °C (98.4 °F)  Temp src: Oral  Pulse: 94  Respiration: 18  Blood Pressure : 137/75  Blood Pressure MAP (Calculated): 96 MM HG  BP Location: Left, Upper Arm  Patient BP Position: Sitting     Oxygen  Pulse Oximetry: 95 %  O2 (LPM): 1.5  O2 Delivery Device: Silicone Nasal Cannula  Incentive Spirometer Volume: 1000 mL    Bowel and Bladder  Last Bowel Movement  07/06/23  Stool Type  Type 6: Fluffy pieces with ragged edges, a mushy stool  Bowel Device  Bathroom  Continent  Bladder: Continent void   Bowel: Continent movement  Bladder Function  Urine Void (mL):  (moderate)  Number of Times Voided: 1  Urine Color: Unable To Evaluate  Number of Times Incontinent of Urine: 0  Genitourinary Assessment   Bladder Assessment (WDL):  Within Defined Limits  Urine Color: Unable To Evaluate  Number of Bladder Accidents: 0  Total  Number of Bladder of Accidents in Last 7 Days: 0  Number of Times Incontinent of Urine: 0  Bladder Device: Bathroom  Bladder Scan: Post Void  $ Bladder Scan Results (mL): 82    Skin  Parveen Score   16  Sensory Interventions   Bed Types: Standard Mattress  Skin Preventative Measures: Pillows in Use for Support / Positioning  Moisture Interventions         Pain  Pain Rating Scale  0 - No Pain  Pain Location  Generalized  Pain Location Orientation     Pain Interventions   Declines    ADLs    Bathing   Shower, Staff (OT)  Linen Change      Personal Hygiene     Chlorhexidine Bath      Oral Care  Brushed Teeth  Teeth/Dentures     Shave     Nutrition Percentage Eaten  *  * Meal *  *, Dinner, Between % Consumed  Environmental Precautions  Treaded Slipper Socks on Patient, Personal Belongings, Wastebasket, Call Bell etc. in Easy Reach, Bed in Low Position  Patient Turns/Positioning  Patient Turns Self from Side to Side  Patient Turns Assistance/Tolerance  Assistance of One, Tolerates Well  Bed Positions  Bed Controls On  Head of Bed Elevated  Self regulated      Psychosocial/Neurologic Assessment  Psychosocial Assessment  Psychosocial (WDL):  Within Defined Limits  Neurologic Assessment  Neuro (WDL): Exceptions to WDL  Level of Consciousness: Alert  Orientation Level: Oriented X4  Cognition: Appropriate judgement, Appropriate safety awareness, Appropriate attention/concentration, Appropriate for developmental age  Speech: Clear  Pupil Assesment: Yes  R Pupil Size (mm): 3  R Pupil Shape / Description: Round  R Pupil Reaction: Brisk  L Pupil Size (mm): 3  L Pupil Shape / Description: Round  L Pupil Reaction: Brisk  Motor Function/Sensation Assessment: Sensation  RUE Sensation: Full sensation  Muscle Strength Right Arm: Fair Strength against Gravity but No Resistance  LUE Sensation: Full sensation  RLE Sensation: Full sensation  Muscle Strength Right Leg: Fair Strength against Gravity but No Resistance  LLE Sensation:  Full sensation  EENT (WDL):  WDL Except (Nystatin swish)    Cardio/Pulmonary Assessment  Edema      Respiratory Breath Sounds  RUL Breath Sounds: Diminished  RML Breath Sounds: Diminished  RLL Breath Sounds: Diminished  OPAL Breath Sounds: Diminished  LLL Breath Sounds: Diminished  Cardiac Assessment   Cardiac (WDL):  WDL Except (Zio patch placed 7/5/23)

## 2023-07-08 NOTE — CARE PLAN
Problem: Knowledge Deficit - Standard  Goal: Patient and family/care givers will demonstrate understanding of plan of care, disease process/condition, diagnostic tests and medications  Outcome: Progressing     Problem: Fall Risk - Rehab  Goal: Patient will remain free from falls  Outcome: Progressing

## 2023-07-08 NOTE — FLOWSHEET NOTE
07/08/23 0914   Events/Summary/Plan   Events/Summary/Plan SpO2 check   Vital Signs   Pulse 83   Respiration 16   Pulse Oximetry 93 %   $ Pulse Oximetry (Spot Check) Yes   Respiratory Assessment   Respiratory Pattern Within Normal Limits   Level of Consciousness Alert   Chest Exam   Work Of Breathing / Effort Within Normal Limits   Oxygen   O2 Delivery Device Room air w/o2 available

## 2023-07-08 NOTE — THERAPY
"Occupational Therapy  Daily Treatment     Patient Name: Nyasia Schuster  Age:  67 y.o., Sex:  female  Medical Record #: [id number]  Today's Date: 7/8/2023     Precautions  Precautions: Fall Risk  Comments: 1L O2 wean, zio patch (currently not working)         Subjective    \"I learned so much yesterday about my stroke.\" Pt able to recall information from previous session well. Pt setup up to attend stroke education class.     Objective       07/08/23 0931   OT Charge Group   OT Electrical Stimulation Attended (Units) 2   OT Self Care / ADL (Units) 1   OT Therapy Activity (Units) 1   OT Total Time Spent   OT Individual Total Time Spent (Mins) 60   Vitals   O2 Delivery Device Room air w/o2 available   Pain   Intervention Declines   Cognition    Level of Consciousness Alert   Functional Level of Assist   Upper Body Dressing Supervision   Upper Body Dressing Description Increased time;Verbal cueing;Supervision for safety;Set-up of equipment  (min v/c for heather-tech to don/doff pullover shirt seated in w.c)   Neuro-Muscular Treatments   Neuro-Muscular Treatments Electrical Stimulation   Comments see note for NMRE details.   Interdisciplinary Plan of Care Collaboration   IDT Collaboration with  Therapy Tech   Patient Position at End of Therapy Seated;Chair Alarm On;Self Releasing Lap Belt Applied   Collaboration Comments Tech assisted with returning pt to her room following      Pt set up on RT-300 FES for rightUE neuro re-ed, ROM and sensory input.  Electrodes applied to right scap stabilizers, shoulder, biceps, triceps, and wrist flexors/extensors. right UE placed in arm trough with ace wrap applied at right wrist in boxers wrap style for increased stability while allowing for finger flex/ext with stimulation. Adjusted  to improved overall positioning and posture using dynadisk on back for improved performance and comfort. Muscle stimulation parameters assessed for each muscle group to pt tolerance to e-stim " and muscle contraction observed. Pt tolerated well with no complaints of pain.    Minutes of Cycling   *including warmup and cool down 22.26 minutes   Distance Traveled 3.29 miles   Energy Expended 0.6 kCal   Energy Per Hour 2.0 kCal/hr   Avg Power 2.3 W   Avg Asymmetry Right 22%     Continued stroke education with neuroplasticity and purpose/function of . Pt setup for adjunct NFC with therapy techs going forward 3x per wk PRN.    Assessment    Pt tolerated  well for RUE NMRE. Noted good activation for each SKM group and increased attention to R-side with cues. Observed increased AROM proximally to 3/4 range in all planes when compared to eval. Distal activation remains limited.  Strengths: Able to follow instructions, Alert and oriented, Effective communication skills, Good carryover of learning, Independent prior level of function, Good insight into deficits/needs, Making steady progress towards goals, Motivated for self care and independence, Pleasant and cooperative, Supportive family, Willingly participates in therapeutic activities  Barriers: Bowel incontinence, Decreased endurance, Fatigue, Generalized weakness, Hemiplegia, Home accessibility, Impaired activity tolerance, Impaired balance, Limited mobility    Plan    ADL retraining using heather-technique, RUE NMRE (, bioness, Saebo, NMES, mirror therapy), functional transfers, standing balance/endurance.    * setup completed with pt to have NFC 3x per week PRN with therapy tech.    Passport items to be completed:  Perform bathroom transfers, complete dressing, complete feeding, get ready for the day, prepare a simple meal, participate in household tasks, adapt home for safety needs, demonstrate home exercise program, complete caregiver training     Occupational Therapy Goals (Active)       Problem: Bathing       Dates: Start:  07/06/23         Goal: STG-Within one week, patient will bathe at SBA using DME/AE PRN.       Dates: Start:   07/06/23               Problem: Dressing       Dates: Start:  07/06/23         Goal: STG-Within one week, patient will dress LB at CGA level using DME/AE PRN.       Dates: Start:  07/06/23               Problem: Eating       Dates: Start:  07/06/23         Goal: STG-Within one week, patient will feed self at Mod I level using AE PRN.       Dates: Start:  07/06/23               Problem: Functional Transfers       Dates: Start:  07/06/23         Goal: STG-Within one week, patient will transfer to toilet at SBA level using DME PRN.       Dates: Start:  07/06/23               Problem: Grooming       Dates: Start:  07/06/23         Goal: STG-Within one week, patient will complete grooming at SPV level using AE PRN.       Dates: Start:  07/06/23               Problem: OT Long Term Goals       Dates: Start:  07/06/23         Goal: LTG-By discharge, patient will complete basic self care tasks at SBA-Mod I level using DME/AE PRN.       Dates: Start:  07/06/23            Goal: LTG-By discharge, patient will perform bathroom transfers at SPV-Mod I level using DME/AE PRN.       Dates: Start:  07/06/23            Goal: LTG-By discharge, patient will complete basic home management at Min A-Mod I level using DME/AE PRN.       Dates: Start:  07/06/23               Problem: Toileting       Dates: Start:  07/06/23         Goal: STG-Within one week, patient will complete toileting tasks at CGA level using DME PRN.       Dates: Start:  07/06/23

## 2023-07-08 NOTE — CARE PLAN
Problem: Knowledge Deficit - Standard  Goal: Patient and family/care givers will demonstrate understanding of plan of care, disease process/condition, diagnostic tests and medications  Outcome: Progressing     Problem: Fall Risk - Rehab  Goal: Patient will remain free from falls  Outcome: Progressing   The patient is Stable - Low risk of patient condition declining or worsening    Shift Goals  Clinical Goals: Safety, BS management  Patient Goals: sleep    Progress made toward(s) clinical / shift goals:  Patient resting in bed no distress noted    Patient is not progressing towards the following goals:

## 2023-07-09 ENCOUNTER — APPOINTMENT (OUTPATIENT)
Dept: OCCUPATIONAL THERAPY | Facility: REHABILITATION | Age: 68
DRG: 056 | End: 2023-07-09
Attending: PHYSICAL MEDICINE & REHABILITATION
Payer: MEDICARE

## 2023-07-09 LAB
GLUCOSE BLD STRIP.AUTO-MCNC: 151 MG/DL (ref 65–99)
GLUCOSE BLD STRIP.AUTO-MCNC: 154 MG/DL (ref 65–99)
GLUCOSE BLD STRIP.AUTO-MCNC: 174 MG/DL (ref 65–99)
GLUCOSE BLD STRIP.AUTO-MCNC: 186 MG/DL (ref 65–99)

## 2023-07-09 PROCEDURE — 82962 GLUCOSE BLOOD TEST: CPT | Mod: 91

## 2023-07-09 PROCEDURE — 770010 HCHG ROOM/CARE - REHAB SEMI PRIVAT*

## 2023-07-09 PROCEDURE — A9270 NON-COVERED ITEM OR SERVICE: HCPCS | Performed by: PHYSICAL MEDICINE & REHABILITATION

## 2023-07-09 PROCEDURE — 700102 HCHG RX REV CODE 250 W/ 637 OVERRIDE(OP): Performed by: HOSPITALIST

## 2023-07-09 PROCEDURE — 700111 HCHG RX REV CODE 636 W/ 250 OVERRIDE (IP): Mod: JZ | Performed by: PHYSICAL MEDICINE & REHABILITATION

## 2023-07-09 PROCEDURE — 97110 THERAPEUTIC EXERCISES: CPT

## 2023-07-09 PROCEDURE — 700101 HCHG RX REV CODE 250: Performed by: PHYSICAL MEDICINE & REHABILITATION

## 2023-07-09 PROCEDURE — 94640 AIRWAY INHALATION TREATMENT: CPT

## 2023-07-09 PROCEDURE — A9270 NON-COVERED ITEM OR SERVICE: HCPCS | Performed by: HOSPITALIST

## 2023-07-09 PROCEDURE — 700102 HCHG RX REV CODE 250 W/ 637 OVERRIDE(OP): Performed by: PHYSICAL MEDICINE & REHABILITATION

## 2023-07-09 PROCEDURE — 97530 THERAPEUTIC ACTIVITIES: CPT

## 2023-07-09 PROCEDURE — 94760 N-INVAS EAR/PLS OXIMETRY 1: CPT

## 2023-07-09 PROCEDURE — 99231 SBSQ HOSP IP/OBS SF/LOW 25: CPT | Performed by: HOSPITALIST

## 2023-07-09 RX ADMIN — INSULIN LISPRO 2 UNITS: 100 INJECTION, SOLUTION INTRAVENOUS; SUBCUTANEOUS at 07:52

## 2023-07-09 RX ADMIN — SIMETHICONE 125 MG: 125 TABLET, CHEWABLE ORAL at 08:10

## 2023-07-09 RX ADMIN — LORATADINE 10 MG: 10 TABLET ORAL at 08:10

## 2023-07-09 RX ADMIN — Medication 3 MG: at 22:13

## 2023-07-09 RX ADMIN — MOMETASONE FUROATE AND FORMOTEROL FUMARATE DIHYDRATE 2 PUFF: 200; 5 AEROSOL RESPIRATORY (INHALATION) at 09:47

## 2023-07-09 RX ADMIN — INSULIN LISPRO 2 UNITS: 100 INJECTION, SOLUTION INTRAVENOUS; SUBCUTANEOUS at 22:15

## 2023-07-09 RX ADMIN — SIMETHICONE 125 MG: 125 TABLET, CHEWABLE ORAL at 17:16

## 2023-07-09 RX ADMIN — NYSTATIN 500000 UNITS: 100000 SUSPENSION ORAL at 17:16

## 2023-07-09 RX ADMIN — METFORMIN HYDROCHLORIDE 500 MG: 500 TABLET ORAL at 17:16

## 2023-07-09 RX ADMIN — NYSTATIN 500000 UNITS: 100000 SUSPENSION ORAL at 22:13

## 2023-07-09 RX ADMIN — INSULIN LISPRO 2 UNITS: 100 INJECTION, SOLUTION INTRAVENOUS; SUBCUTANEOUS at 17:13

## 2023-07-09 RX ADMIN — POLYETHYLENE GLYCOL 3350 1 PACKET: 17 POWDER, FOR SOLUTION ORAL at 05:01

## 2023-07-09 RX ADMIN — CLOPIDOGREL BISULFATE 75 MG: 75 TABLET ORAL at 08:10

## 2023-07-09 RX ADMIN — ASPIRIN 81 MG CHEWABLE TABLET 81 MG: 81 TABLET CHEWABLE at 08:10

## 2023-07-09 RX ADMIN — INSULIN LISPRO 2 UNITS: 100 INJECTION, SOLUTION INTRAVENOUS; SUBCUTANEOUS at 11:19

## 2023-07-09 RX ADMIN — SIMETHICONE 125 MG: 125 TABLET, CHEWABLE ORAL at 11:23

## 2023-07-09 RX ADMIN — ATORVASTATIN CALCIUM 40 MG: 40 TABLET, FILM COATED ORAL at 22:13

## 2023-07-09 RX ADMIN — METFORMIN HYDROCHLORIDE 500 MG: 500 TABLET ORAL at 08:10

## 2023-07-09 RX ADMIN — ENOXAPARIN SODIUM 40 MG: 100 INJECTION SUBCUTANEOUS at 17:16

## 2023-07-09 RX ADMIN — NICOTINE TRANSDERMAL SYSTEM 21 MG: 21 PATCH, EXTENDED RELEASE TRANSDERMAL at 05:01

## 2023-07-09 RX ADMIN — NYSTATIN 500000 UNITS: 100000 SUSPENSION ORAL at 08:10

## 2023-07-09 RX ADMIN — NYSTATIN 500000 UNITS: 100000 SUSPENSION ORAL at 11:23

## 2023-07-09 ASSESSMENT — ENCOUNTER SYMPTOMS
NAUSEA: 0
FOCAL WEAKNESS: 1
ABDOMINAL PAIN: 0
VOMITING: 0
FEVER: 0
SENSORY CHANGE: 1
MUSCULOSKELETAL NEGATIVE: 1
POLYDIPSIA: 0
PALPITATIONS: 0
SHORTNESS OF BREATH: 0
CHILLS: 0
BRUISES/BLEEDS EASILY: 0
COUGH: 0
EYES NEGATIVE: 1

## 2023-07-09 ASSESSMENT — PAIN DESCRIPTION - PAIN TYPE: TYPE: ACUTE PAIN

## 2023-07-09 ASSESSMENT — ACTIVITIES OF DAILY LIVING (ADL): BED_CHAIR_WHEELCHAIR_TRANSFER_DESCRIPTION: ADAPTIVE EQUIPMENT;SUPERVISION FOR SAFETY

## 2023-07-09 NOTE — CARE PLAN
The patient is Stable - Low risk of patient condition declining or worsening    Shift Goals  Clinical Goals: safety  Patient Goals: safety, sleep/rest    Progress made toward(s) clinical / shift goals:      Problem: Knowledge Deficit - Standard  Goal: Patient and family/care givers will demonstrate understanding of plan of care, disease process/condition, diagnostic tests and medications  Outcome: Progressing     Problem: Fall Risk - Rehab  Goal: Patient will remain free from falls  Outcome: Progressing  Patient use call light for assistance.     Problem: Pain - Standard  Goal: Alleviation of pain or a reduction in pain to the patient’s comfort goal  Outcome: Progressing  Patient denied any pain during shift.        Patient is not progressing towards the following goals:

## 2023-07-09 NOTE — FLOWSHEET NOTE
07/09/23 0948   Events/Summary/Plan   Events/Summary/Plan SpO2 check on room air, DC oxygen per protocol.  MDI   Vital Signs   Pulse 96   Respiration 18   Pulse Oximetry 93 %   $ Pulse Oximetry (Spot Check) Yes   Respiratory Assessment   Respiratory Pattern Within Normal Limits   Level of Consciousness Alert   Chest Exam   Work Of Breathing / Effort Within Normal Limits   Oxygen   O2 Delivery Device None - Room Air   Room Air Challenge Pass

## 2023-07-09 NOTE — THERAPY
Occupational Therapy  Daily Treatment     Patient Name: Nyasia Schuster  Age:  67 y.o., Sex:  female  Medical Record #: [id number]  Today's Date: 7/9/2023     Precautions  Precautions: (P) Fall Risk  Comments: (P) 1L O2 wean, zio patch (currently not working)         Subjective    Pt seated in w/c in cafeteria upon arrival. Pt finishing up breakfast. Pt pleasant and cooperative, agreeable to therapy.     Objective       07/09/23 0831   OT Total Time Spent   OT Individual Total Time Spent (Mins) 30   Precautions   Precautions Fall Risk   Comments 1L O2 wean, zio patch (currently not working)   Pain   Intervention Declines   Cognition    Level of Consciousness Alert   Sleep/Wake Cycle   Sleep & Rest Awake   Active ROM Upper Body   Active ROM Upper Body  X   Dominant Hand Right   Comments LUE WFL. R shld flexion WFL. R shld abd WFL, R digits no movement, R wrist flexion/extension minimal   Functional Level of Assist   Bed, Chair, Wheelchair Transfer Contact Guard Assist   Bed Chair Wheelchair Transfer Description Adaptive equipment;Supervision for safety  (Stand pivot to w/c)   Interdisciplinary Plan of Care Collaboration   Patient Position at End of Therapy Seated;Chair Alarm On;Call Light within Reach;Tray Table within Reach;Phone within Reach;Self Releasing Lap Belt Applied     Functional Mobility- pt ambulated from w/c <> window (approximately 25 ft) using single point cane and CGA.     Assessment    Pt R UE ROM already showing improvement. R wrist and digit movement still impaired but could be due to arthritis in hand. Functional mobility improving with single point cane.     Strengths: Able to follow instructions, Alert and oriented, Effective communication skills, Good carryover of learning, Independent prior level of function, Good insight into deficits/needs, Making steady progress towards goals, Motivated for self care and independence, Pleasant and cooperative, Supportive family, Willingly participates in  therapeutic activities  Barriers: Bowel incontinence, Decreased endurance, Fatigue, Generalized weakness, Hemiplegia, Home accessibility, Impaired activity tolerance, Impaired balance, Limited mobility    Plan    Continue R UE strengthening. ADL/IADL training using single point cane.    Passport items to be completed:  Perform bathroom transfers, complete dressing, complete feeding, get ready for the day, prepare a simple meal, participate in household tasks, adapt home for safety needs, demonstrate home exercise program, complete caregiver training     Occupational Therapy Goals (Active)       Problem: Bathing       Dates: Start:  07/06/23         Goal: STG-Within one week, patient will bathe at SBA using DME/AE PRN.       Dates: Start:  07/06/23               Problem: Dressing       Dates: Start:  07/06/23         Goal: STG-Within one week, patient will dress LB at CGA level using DME/AE PRN.       Dates: Start:  07/06/23               Problem: Eating       Dates: Start:  07/06/23         Goal: STG-Within one week, patient will feed self at Mod I level using AE PRN.       Dates: Start:  07/06/23               Problem: Functional Transfers       Dates: Start:  07/06/23         Goal: STG-Within one week, patient will transfer to toilet at SBA level using DME PRN.       Dates: Start:  07/06/23               Problem: Grooming       Dates: Start:  07/06/23         Goal: STG-Within one week, patient will complete grooming at SPV level using AE PRN.       Dates: Start:  07/06/23               Problem: OT Long Term Goals       Dates: Start:  07/06/23         Goal: LTG-By discharge, patient will complete basic self care tasks at SBA-Mod I level using DME/AE PRN.       Dates: Start:  07/06/23            Goal: LTG-By discharge, patient will perform bathroom transfers at SPV-Mod I level using DME/AE PRN.       Dates: Start:  07/06/23            Goal: LTG-By discharge, patient will complete basic home management at Min A-Mod I  level using DME/AE PRN.       Dates: Start:  07/06/23               Problem: Toileting       Dates: Start:  07/06/23         Goal: STG-Within one week, patient will complete toileting tasks at CGA level using DME PRN.       Dates: Start:  07/06/23

## 2023-07-09 NOTE — PROGRESS NOTES
NURSING DAILY NOTE    Name: Nyasia Schuster   Date of Admission: 7/5/2023   Admitting Diagnosis: Ischemic stroke (HCC)  Attending Physician: Unique Black M.d.  Allergies: Patient has no known allergies.    Safety  Patient Assist  min  Patient Precautions  Fall Risk  Precaution Comments  1L O2 wean, zio patch (currently not working)  Bed Transfer Status  Contact Guard Assist  Toilet Transfer Status   Contact Guard Assist  Assistive Devices  Rails, Wheelchair  Oxygen  None - Room Air  Diet/Therapeutic Dining  Current Diet Order   Procedures    Diet Order Diet: Level 7 - Easy to Chew (meds whole with thins, Please add a T- rocker knife to all plates.); Liquid level: Level 0 - Thin; Second Modifier: (optional): Consistent CHO (Diabetic)     Pill Administration  whole  Agitated Behavioral Scale  14  ABS Level of Severity  No Agitation    Fall Risk  Has the patient had a fall this admission?   No  Deanne Cheng Fall Risk Scoring  11, MODERATE RISK  Fall Risk Safety Measures  bed alarm and chair alarm    Vitals  Temperature: 36.9 °C (98.5 °F)  Temp src: Oral  Pulse: 94  Respiration: 18  Blood Pressure : (!) 140/74  Blood Pressure MAP (Calculated): 96 MM HG  BP Location: Left, Upper Arm  Patient BP Position: Sitting     Oxygen  Pulse Oximetry: 91 %  O2 (LPM): 1  O2 Delivery Device: None - Room Air  Incentive Spirometer Volume: 1000 mL    Bowel and Bladder  Last Bowel Movement  07/09/23  Stool Type  Type 6: Fluffy pieces with ragged edges, a mushy stool  Bowel Device  Bathroom  Continent  Bladder: Continent void   Bowel: Continent movement  Bladder Function  Urine Void (mL):  (moderate)  Number of Times Voided: 1  Urine Color: Yellow  Number of Times Incontinent of Urine: 0  Genitourinary Assessment   Bladder Assessment (WDL):  Within Defined Limits  Urine Color: Yellow  Number of Bladder Accidents: 0  Total Number of Bladder of Accidents in Last 7 Days:  0  Number of Times Incontinent of Urine: 0  Bladder Device: Bathroom  Bladder Scan: Post Void  $ Bladder Scan Results (mL): 82    Skin  Parveen Score   18  Sensory Interventions   Bed Types: Standard Mattress  Skin Preventative Measures: Pillows in Use for Support / Positioning  Moisture Interventions         Pain  Pain Rating Scale  0 - No Pain  Pain Location  Generalized  Pain Location Orientation     Pain Interventions   Declines    ADLs    Bathing   Shower, Staff (OT)  Linen Change      Personal Hygiene  Perineal Care (HANDWASHED)  Chlorhexidine Bath      Oral Care  Brushed Teeth  Teeth/Dentures     Shave     Nutrition Percentage Eaten  *  * Meal *  *, Lunch, Between 50-75% Consumed  Environmental Precautions  Treaded Slipper Socks on Patient, Personal Belongings, Wastebasket, Call Bell etc. in Easy Reach, Bed in Low Position  Patient Turns/Positioning  Patient Turns Self from Side to Side  Patient Turns Assistance/Tolerance  Assistance of One  Bed Positions  Bed Controls On  Head of Bed Elevated  Self regulated      Psychosocial/Neurologic Assessment  Psychosocial Assessment  Psychosocial (WDL):  Within Defined Limits  Neurologic Assessment  Neuro (WDL): Exceptions to WDL  Level of Consciousness: Alert  Orientation Level: Oriented X4  Cognition: Appropriate judgement, Appropriate safety awareness, Appropriate attention/concentration, Appropriate for developmental age  Speech: Clear  Pupil Assesment: Yes  R Pupil Size (mm): 3  R Pupil Shape / Description: Round  R Pupil Reaction: Brisk  L Pupil Size (mm): 3  L Pupil Shape / Description: Round  L Pupil Reaction: Brisk  Motor Function/Sensation Assessment: Sensation  RUE Sensation: Full sensation  Muscle Strength Right Arm: Fair Strength against Gravity but No Resistance  LUE Sensation: Full sensation  RLE Sensation: Full sensation  Muscle Strength Right Leg: Fair Strength against Gravity but No Resistance  LLE Sensation: Full sensation  EENT (WDL):  WDL  Except    Cardio/Pulmonary Assessment  Edema      Respiratory Breath Sounds  RUL Breath Sounds: Diminished  RML Breath Sounds: Diminished  RLL Breath Sounds: Diminished  OPAL Breath Sounds: Diminished  LLL Breath Sounds: Diminished  Cardiac Assessment   Cardiac (WDL):  WDL Except (ramya)

## 2023-07-09 NOTE — PROGRESS NOTES
Hospital Medicine Daily Progress Note      Chief Complaint  Diabetes    Interval Problem Update  No 24 hour clinical changes.  Labs reviewed and discussed.    Review of Systems  Review of Systems   Constitutional:  Negative for chills and fever.   HENT: Negative.     Eyes: Negative.    Respiratory:  Negative for cough and shortness of breath.    Cardiovascular:  Negative for chest pain and palpitations.   Gastrointestinal:  Negative for abdominal pain, nausea and vomiting.   Genitourinary: Negative.    Musculoskeletal: Negative.    Skin:  Negative for itching and rash.   Neurological:  Positive for sensory change and focal weakness.   Endo/Heme/Allergies:  Negative for polydipsia. Does not bruise/bleed easily.        Physical Exam  Temp:  [36.6 °C (97.9 °F)-36.8 °C (98.3 °F)] 36.6 °C (97.9 °F)  Pulse:  [87-96] 96  Resp:  [18] 18  BP: (139-141)/(73-90) 141/73  SpO2:  [91 %-93 %] 93 %    Physical Exam  Vitals reviewed.   Constitutional:       General: She is not in acute distress.     Appearance: Normal appearance. She is not ill-appearing.   HENT:      Head: Normocephalic and atraumatic.      Right Ear: External ear normal.      Left Ear: External ear normal.      Nose: Nose normal.      Mouth/Throat:      Pharynx: Oropharynx is clear.   Eyes:      General:         Right eye: No discharge.         Left eye: No discharge.      Extraocular Movements: Extraocular movements intact.      Conjunctiva/sclera: Conjunctivae normal.   Cardiovascular:      Rate and Rhythm: Normal rate and regular rhythm.   Pulmonary:      Effort: Pulmonary effort is normal. No respiratory distress.      Breath sounds: Normal breath sounds. No wheezing.   Abdominal:      General: Bowel sounds are normal. There is no distension.      Palpations: Abdomen is soft.      Tenderness: There is no abdominal tenderness.   Musculoskeletal:      Cervical back: Normal range of motion and neck supple.      Right lower leg: No edema.      Left lower leg: No  edema.   Skin:     General: Skin is warm and dry.   Neurological:      Mental Status: She is alert and oriented to person, place, and time.         Fluids    Intake/Output Summary (Last 24 hours) at 7/9/2023 1327  Last data filed at 7/9/2023 1200  Gross per 24 hour   Intake 1200 ml   Output --   Net 1200 ml       Laboratory  Recent Labs     07/08/23  0512   WBC 8.1   RBC 5.21   HEMOGLOBIN 15.9   HEMATOCRIT 47.0   MCV 90.2   MCH 30.5   MCHC 33.8   RDW 39.3   PLATELETCT 172   MPV 11.1     Recent Labs     07/08/23  0512   SODIUM 140   POTASSIUM 4.0   CHLORIDE 102   CO2 26   GLUCOSE 164*   BUN 8   CREATININE 0.51   CALCIUM 9.4                   Assessment/Plan  * Ischemic stroke (HCC)- (present on admission)  Assessment & Plan  Presented w/ dysarthria and R HP  On ASA, Plavix, and Lipitor  Also has ZioPatch  Management per Physiatry    Type 2 diabetes mellitus without complication, without long-term current use of insulin (HCC)- (present on admission)  Assessment & Plan  HbA1c 12.6  Start low dose Metformin  Continue Lantus and SSI  Observe serum glucose trends  Outpt meds include Metformin (but has not taken in >8 years because pt was not getting regular medical care)    Tobacco abuse- (present on admission)  Assessment & Plan  On Dulera  RT protocol       Full Code

## 2023-07-09 NOTE — PROGRESS NOTES
NURSING DAILY NOTE    Name: Nyasia Schuster   Date of Admission: 7/5/2023   Admitting Diagnosis: Ischemic stroke (HCC)  Attending Physician: Unique Black M.d.  Allergies: Patient has no known allergies.    Safety  Patient Assist  min  Patient Precautions  Fall Risk  Precaution Comments  1L O2 wean, zio patch (currently not working)  Bed Transfer Status  Minimal Assist (to CGA)  Toilet Transfer Status   Contact Guard Assist  Assistive Devices  Rails, Wheelchair  Oxygen  Room air w/o2 available  Diet/Therapeutic Dining  Current Diet Order   Procedures    Diet Order Diet: Level 7 - Easy to Chew (meds whole with thins, Please add a T- rocker knife to all plates.); Liquid level: Level 0 - Thin; Second Modifier: (optional): Consistent CHO (Diabetic)     Pill Administration  whole  Agitated Behavioral Scale  14  ABS Level of Severity  No Agitation    Fall Risk  Has the patient had a fall this admission?   No  Deanne Cheng Fall Risk Scoring  14, MODERATE RISK  Fall Risk Safety Measures  bed alarm and chair alarm    Vitals  Temperature: 36.8 °C (98.3 °F)  Temp src: Oral  Pulse: 89  Respiration: 18  Blood Pressure : (!) 139/90  Blood Pressure MAP (Calculated): 106 MM HG  BP Location: Left, Upper Arm  Patient BP Position: Sitting     Oxygen  Pulse Oximetry: 91 %  O2 (LPM): 1  O2 Delivery Device: Room air w/o2 available  Incentive Spirometer Volume: 1000 mL    Bowel and Bladder  Last Bowel Movement  07/06/23 (per pt report), given Miralax 7/9/23  Stool Type  Type 6: Fluffy pieces with ragged edges, a mushy stool  Bowel Device  Bathroom  Continent  Bladder: Continent void   Bowel: Continent movement  Bladder Function  Urine Void (mL):  (moderate)  Number of Times Voided: 1  Urine Color: Yellow  Number of Times Incontinent of Urine: 0  Genitourinary Assessment   Bladder Assessment (WDL):  Within Defined Limits  Urine Color: Yellow  Number of Bladder Accidents:  0  Total Number of Bladder of Accidents in Last 7 Days: 0  Number of Times Incontinent of Urine: 0  Bladder Device: Bathroom  Bladder Scan: Post Void  $ Bladder Scan Results (mL): 82    Skin  Parveen Score   16  Sensory Interventions   Bed Types: Standard Mattress  Skin Preventative Measures: Pillows in Use for Support / Positioning  Moisture Interventions         Pain  Pain Rating Scale  0 - No Pain  Pain Location  Generalized  Pain Location Orientation     Pain Interventions   Declines    ADLs    Bathing   Shower, Staff (OT)  Linen Change      Personal Hygiene  Perineal Care (HANDWASHED)  Chlorhexidine Bath      Oral Care  Brushed Teeth  Teeth/Dentures     Shave     Nutrition Percentage Eaten  *  * Meal *  *, Dinner, Between % Consumed  Environmental Precautions  Treaded Slipper Socks on Patient, Personal Belongings, Wastebasket, Call Bell etc. in Easy Reach, Bed in Low Position  Patient Turns/Positioning  Patient Turns Self from Side to Side  Patient Turns Assistance/Tolerance  Assistance of One  Bed Positions  Bed Controls On  Head of Bed Elevated  Self regulated      Psychosocial/Neurologic Assessment  Psychosocial Assessment  Psychosocial (WDL):  Within Defined Limits  Neurologic Assessment  Neuro (WDL): Exceptions to WDL  Level of Consciousness: Alert  Orientation Level: Oriented X4  Cognition: Appropriate judgement, Appropriate safety awareness, Appropriate attention/concentration, Appropriate for developmental age  Speech: Clear  Pupil Assesment: Yes  R Pupil Size (mm): 3  R Pupil Shape / Description: Round  R Pupil Reaction: Brisk  L Pupil Size (mm): 3  L Pupil Shape / Description: Round  L Pupil Reaction: Brisk  Motor Function/Sensation Assessment: Sensation  RUE Sensation: Full sensation  Muscle Strength Right Arm: Fair Strength against Gravity but No Resistance  LUE Sensation: Full sensation  RLE Sensation: Full sensation  Muscle Strength Right Leg: Fair Strength against Gravity but No  Resistance  LLE Sensation: Full sensation  EENT (WDL):  WDL Except (Nystatin)    Cardio/Pulmonary Assessment  Edema      Respiratory Breath Sounds  RUL Breath Sounds: Diminished  RML Breath Sounds: Diminished  RLL Breath Sounds: Diminished  OPAL Breath Sounds: Diminished  LLL Breath Sounds: Diminished  Cardiac Assessment   Cardiac (WDL):  WDL Except (Zio patch placed 7/5/23)

## 2023-07-09 NOTE — PROGRESS NOTES
Hospital Medicine Daily Progress Note      Chief Complaint  Diabetes    Interval Problem Update  No chest pain, shortness of breath, or palpitations.    Review of Systems  Review of Systems   Constitutional:  Negative for chills and fever.   HENT: Negative.     Eyes: Negative.    Respiratory:  Negative for cough and shortness of breath.    Cardiovascular:  Negative for chest pain and palpitations.   Gastrointestinal:  Negative for abdominal pain, nausea and vomiting.   Genitourinary: Negative.    Musculoskeletal: Negative.    Skin:  Negative for itching and rash.   Neurological:  Positive for sensory change and focal weakness.   Endo/Heme/Allergies:  Negative for polydipsia. Does not bruise/bleed easily.        Physical Exam  Temp:  [36.6 °C (97.9 °F)-36.8 °C (98.3 °F)] 36.6 °C (97.9 °F)  Pulse:  [87-96] 96  Resp:  [18] 18  BP: (139-141)/(73-90) 141/73  SpO2:  [91 %-93 %] 93 %    Physical Exam  Vitals reviewed.   Constitutional:       General: She is not in acute distress.     Appearance: Normal appearance. She is not ill-appearing.   HENT:      Head: Normocephalic and atraumatic.      Right Ear: External ear normal.      Left Ear: External ear normal.      Nose: Nose normal.      Mouth/Throat:      Pharynx: Oropharynx is clear.   Eyes:      General:         Right eye: No discharge.         Left eye: No discharge.      Extraocular Movements: Extraocular movements intact.      Conjunctiva/sclera: Conjunctivae normal.   Cardiovascular:      Rate and Rhythm: Normal rate and regular rhythm.   Pulmonary:      Effort: Pulmonary effort is normal. No respiratory distress.      Breath sounds: Normal breath sounds. No wheezing.   Abdominal:      General: Bowel sounds are normal. There is no distension.      Palpations: Abdomen is soft.      Tenderness: There is no abdominal tenderness.   Musculoskeletal:      Cervical back: Normal range of motion and neck supple.      Right lower leg: No edema.      Left lower leg: No edema.    Skin:     General: Skin is warm and dry.   Neurological:      Mental Status: She is alert and oriented to person, place, and time.         Fluids    Intake/Output Summary (Last 24 hours) at 7/9/2023 1322  Last data filed at 7/9/2023 1200  Gross per 24 hour   Intake 1200 ml   Output --   Net 1200 ml       Laboratory  Recent Labs     07/08/23  0512   WBC 8.1   RBC 5.21   HEMOGLOBIN 15.9   HEMATOCRIT 47.0   MCV 90.2   MCH 30.5   MCHC 33.8   RDW 39.3   PLATELETCT 172   MPV 11.1     Recent Labs     07/08/23  0512   SODIUM 140   POTASSIUM 4.0   CHLORIDE 102   CO2 26   GLUCOSE 164*   BUN 8   CREATININE 0.51   CALCIUM 9.4                   Assessment/Plan  * Ischemic stroke (HCC)- (present on admission)  Assessment & Plan  Presented w/ dysarthria and R HP  On ASA, Plavix, and Lipitor  Also has ZioPatch  Management per Physiatry    Type 2 diabetes mellitus without complication, without long-term current use of insulin (HCC)- (present on admission)  Assessment & Plan  HbA1c 12.6  Continue Lantus and SSI  Observe serum glucose trends  Check F/U labs in AM    Tobacco abuse- (present on admission)  Assessment & Plan  On Dulera  RT protocol       Full Code

## 2023-07-09 NOTE — CARE PLAN
"The patient is Stable - Low risk of patient condition declining or worsening    Shift Goals  Clinical Goals: safety  Patient Goals: safety, sleep/rest      Problem: Fall Risk - Rehab  Goal: Patient will remain free from falls  Outcome: Progressing  Note: Deanne Cheng Fall risk Assessment Score: 14    Moderate fall risk Interventions  - Bed and strip alarm   - Yellow sign by the door   - Yellow wrist band \"Fall risk\"  - Room near to the nurse station  - Do not leave patient unattended in the bathroom  - Fall risk education provided    Patient uses call light consistently and appropriately this shift.  Waits for assistance when needed and does not attempt self transfer.  Able to verbalize needs.  Will continue to monitor.          Problem: Hemodynamics  Goal: Patient's hemodynamics, fluid balance and neurologic status will be stable or improve  Outcome: Progressing  Note: Ac/Hs of blood sugar check with coverage given tonight. No s/s of hypo/hyperglycemia.  Refused to have snacks before going to bed. Needs attended and met and will continue on close monitoring.        "

## 2023-07-09 NOTE — ASSESSMENT & PLAN NOTE
Hba1c: 12.6 (7/2)  BS recently: 110-170 (hit 221 x 1)  Cont Metformin: 850 mg bid --> 1000 mg bid (7/13 am) --> XR 1000 mg qhs (7/15)  Pt tolerating Metformin XR recently  Pt may be having GI upset from the Metformin  Note: home meds include Metformin & Glipizide 2.5 mg qam           has not taken her meds for > 8 years because 2nd to not getting regular medical care  Cont to monitor

## 2023-07-09 NOTE — PROGRESS NOTES
Hospital Medicine Daily Progress Note      Chief Complaint  Diabetes    Interval Problem Update  Doing well.    Review of Systems  Review of Systems   Constitutional:  Negative for chills and fever.   HENT: Negative.     Eyes: Negative.    Respiratory:  Negative for cough and shortness of breath.    Cardiovascular:  Negative for chest pain and palpitations.   Gastrointestinal:  Negative for abdominal pain, nausea and vomiting.   Genitourinary: Negative.    Musculoskeletal: Negative.    Skin:  Negative for itching and rash.   Neurological:  Positive for sensory change and focal weakness.   Endo/Heme/Allergies:  Negative for polydipsia. Does not bruise/bleed easily.        Physical Exam  Temp:  [36.6 °C (97.9 °F)-36.8 °C (98.3 °F)] 36.6 °C (97.9 °F)  Pulse:  [87-96] 96  Resp:  [18] 18  BP: (139-141)/(73-90) 141/73  SpO2:  [91 %-93 %] 93 %    Physical Exam  Vitals reviewed.   Constitutional:       General: She is not in acute distress.     Appearance: Normal appearance. She is not ill-appearing.   HENT:      Head: Normocephalic and atraumatic.      Right Ear: External ear normal.      Left Ear: External ear normal.      Nose: Nose normal.      Mouth/Throat:      Pharynx: Oropharynx is clear.   Eyes:      General:         Right eye: No discharge.         Left eye: No discharge.      Extraocular Movements: Extraocular movements intact.      Conjunctiva/sclera: Conjunctivae normal.   Cardiovascular:      Rate and Rhythm: Normal rate and regular rhythm.   Pulmonary:      Effort: Pulmonary effort is normal. No respiratory distress.      Breath sounds: Normal breath sounds. No wheezing.   Abdominal:      General: Bowel sounds are normal. There is no distension.      Palpations: Abdomen is soft.      Tenderness: There is no abdominal tenderness.   Musculoskeletal:      Cervical back: Normal range of motion and neck supple.      Right lower leg: No edema.      Left lower leg: No edema.   Skin:     General: Skin is warm and  dry.   Neurological:      Mental Status: She is alert and oriented to person, place, and time.         Fluids    Intake/Output Summary (Last 24 hours) at 7/9/2023 1329  Last data filed at 7/9/2023 1200  Gross per 24 hour   Intake 1200 ml   Output --   Net 1200 ml       Laboratory  Recent Labs     07/08/23  0512   WBC 8.1   RBC 5.21   HEMOGLOBIN 15.9   HEMATOCRIT 47.0   MCV 90.2   MCH 30.5   MCHC 33.8   RDW 39.3   PLATELETCT 172   MPV 11.1     Recent Labs     07/08/23  0512   SODIUM 140   POTASSIUM 4.0   CHLORIDE 102   CO2 26   GLUCOSE 164*   BUN 8   CREATININE 0.51   CALCIUM 9.4                   Assessment/Plan  * Ischemic stroke (HCC)- (present on admission)  Assessment & Plan  Presented w/ dysarthria and R HP  On ASA, Plavix, and Lipitor  Also has ZioPatch  Management per Physiatry    Type 2 diabetes mellitus without complication, without long-term current use of insulin (HCC)- (present on admission)  Assessment & Plan  HbA1c 12.6  Started low dose Metformin, titrate up to effect  Continue Lantus and SSI  Outpt meds include Metformin (but has not taken in >8 years because pt was not getting regular medical care)    Tobacco abuse- (present on admission)  Assessment & Plan  On Dulera  RT protocol       Full Code

## 2023-07-10 ENCOUNTER — APPOINTMENT (OUTPATIENT)
Dept: OCCUPATIONAL THERAPY | Facility: REHABILITATION | Age: 68
DRG: 056 | End: 2023-07-10
Attending: PHYSICAL MEDICINE & REHABILITATION
Payer: MEDICARE

## 2023-07-10 ENCOUNTER — APPOINTMENT (OUTPATIENT)
Dept: PHYSICAL THERAPY | Facility: REHABILITATION | Age: 68
DRG: 056 | End: 2023-07-10
Attending: PHYSICAL MEDICINE & REHABILITATION
Payer: MEDICARE

## 2023-07-10 DIAGNOSIS — I63.9 ISCHEMIC STROKE (HCC): ICD-10-CM

## 2023-07-10 PROBLEM — I10 HYPERTENSION: Status: ACTIVE | Noted: 2023-07-10

## 2023-07-10 LAB
GLUCOSE BLD STRIP.AUTO-MCNC: 150 MG/DL (ref 65–99)
GLUCOSE BLD STRIP.AUTO-MCNC: 168 MG/DL (ref 65–99)
GLUCOSE BLD STRIP.AUTO-MCNC: 170 MG/DL (ref 65–99)
GLUCOSE BLD STRIP.AUTO-MCNC: 176 MG/DL (ref 65–99)

## 2023-07-10 PROCEDURE — A9270 NON-COVERED ITEM OR SERVICE: HCPCS | Performed by: HOSPITALIST

## 2023-07-10 PROCEDURE — 700102 HCHG RX REV CODE 250 W/ 637 OVERRIDE(OP): Performed by: PHYSICAL MEDICINE & REHABILITATION

## 2023-07-10 PROCEDURE — 770010 HCHG ROOM/CARE - REHAB SEMI PRIVAT*

## 2023-07-10 PROCEDURE — 97116 GAIT TRAINING THERAPY: CPT

## 2023-07-10 PROCEDURE — 700102 HCHG RX REV CODE 250 W/ 637 OVERRIDE(OP): Performed by: HOSPITALIST

## 2023-07-10 PROCEDURE — 94760 N-INVAS EAR/PLS OXIMETRY 1: CPT

## 2023-07-10 PROCEDURE — 94640 AIRWAY INHALATION TREATMENT: CPT

## 2023-07-10 PROCEDURE — A9270 NON-COVERED ITEM OR SERVICE: HCPCS | Performed by: PHYSICAL MEDICINE & REHABILITATION

## 2023-07-10 PROCEDURE — 99232 SBSQ HOSP IP/OBS MODERATE 35: CPT | Performed by: HOSPITALIST

## 2023-07-10 PROCEDURE — 700111 HCHG RX REV CODE 636 W/ 250 OVERRIDE (IP): Mod: JZ | Performed by: PHYSICAL MEDICINE & REHABILITATION

## 2023-07-10 PROCEDURE — 82962 GLUCOSE BLOOD TEST: CPT | Mod: 91

## 2023-07-10 PROCEDURE — 97110 THERAPEUTIC EXERCISES: CPT

## 2023-07-10 PROCEDURE — 97112 NEUROMUSCULAR REEDUCATION: CPT

## 2023-07-10 PROCEDURE — 99232 SBSQ HOSP IP/OBS MODERATE 35: CPT | Performed by: PHYSICAL MEDICINE & REHABILITATION

## 2023-07-10 PROCEDURE — 97535 SELF CARE MNGMENT TRAINING: CPT

## 2023-07-10 RX ORDER — LISINOPRIL 5 MG/1
5 TABLET ORAL
Status: DISCONTINUED | OUTPATIENT
Start: 2023-07-10 | End: 2023-07-15

## 2023-07-10 RX ADMIN — CLOPIDOGREL BISULFATE 75 MG: 75 TABLET ORAL at 08:38

## 2023-07-10 RX ADMIN — INSULIN LISPRO 2 UNITS: 100 INJECTION, SOLUTION INTRAVENOUS; SUBCUTANEOUS at 11:12

## 2023-07-10 RX ADMIN — METFORMIN HYDROCHLORIDE 500 MG: 500 TABLET ORAL at 08:37

## 2023-07-10 RX ADMIN — INSULIN LISPRO 2 UNITS: 100 INJECTION, SOLUTION INTRAVENOUS; SUBCUTANEOUS at 20:42

## 2023-07-10 RX ADMIN — Medication 3 MG: at 20:44

## 2023-07-10 RX ADMIN — LORATADINE 10 MG: 10 TABLET ORAL at 08:37

## 2023-07-10 RX ADMIN — NYSTATIN 500000 UNITS: 100000 SUSPENSION ORAL at 14:16

## 2023-07-10 RX ADMIN — MOMETASONE FUROATE AND FORMOTEROL FUMARATE DIHYDRATE 2 PUFF: 200; 5 AEROSOL RESPIRATORY (INHALATION) at 07:55

## 2023-07-10 RX ADMIN — INSULIN LISPRO 2 UNITS: 100 INJECTION, SOLUTION INTRAVENOUS; SUBCUTANEOUS at 07:12

## 2023-07-10 RX ADMIN — NICOTINE TRANSDERMAL SYSTEM 21 MG: 21 PATCH, EXTENDED RELEASE TRANSDERMAL at 06:15

## 2023-07-10 RX ADMIN — SIMETHICONE 125 MG: 125 TABLET, CHEWABLE ORAL at 17:13

## 2023-07-10 RX ADMIN — NYSTATIN 500000 UNITS: 100000 SUSPENSION ORAL at 20:44

## 2023-07-10 RX ADMIN — METFORMIN HYDROCHLORIDE 500 MG: 500 TABLET ORAL at 17:13

## 2023-07-10 RX ADMIN — LISINOPRIL 5 MG: 5 TABLET ORAL at 11:29

## 2023-07-10 RX ADMIN — ENOXAPARIN SODIUM 40 MG: 100 INJECTION SUBCUTANEOUS at 17:13

## 2023-07-10 RX ADMIN — SIMETHICONE 125 MG: 125 TABLET, CHEWABLE ORAL at 11:29

## 2023-07-10 RX ADMIN — SIMETHICONE 125 MG: 125 TABLET, CHEWABLE ORAL at 08:37

## 2023-07-10 RX ADMIN — NYSTATIN 500000 UNITS: 100000 SUSPENSION ORAL at 08:37

## 2023-07-10 RX ADMIN — MOMETASONE FUROATE AND FORMOTEROL FUMARATE DIHYDRATE 2 PUFF: 200; 5 AEROSOL RESPIRATORY (INHALATION) at 20:47

## 2023-07-10 RX ADMIN — NYSTATIN 500000 UNITS: 100000 SUSPENSION ORAL at 17:13

## 2023-07-10 RX ADMIN — ATORVASTATIN CALCIUM 40 MG: 40 TABLET, FILM COATED ORAL at 20:44

## 2023-07-10 RX ADMIN — ASPIRIN 81 MG CHEWABLE TABLET 81 MG: 81 TABLET CHEWABLE at 08:37

## 2023-07-10 ASSESSMENT — ENCOUNTER SYMPTOMS
BRUISES/BLEEDS EASILY: 0
MUSCULOSKELETAL NEGATIVE: 1
CHILLS: 0
NAUSEA: 0
SENSORY CHANGE: 1
VOMITING: 0
EYES NEGATIVE: 1
POLYDIPSIA: 0
COUGH: 0
FOCAL WEAKNESS: 1
PALPITATIONS: 0
ABDOMINAL PAIN: 0
FEVER: 0
SHORTNESS OF BREATH: 0

## 2023-07-10 ASSESSMENT — GAIT ASSESSMENTS
DISTANCE (FEET): 200
GAIT LEVEL OF ASSIST: CONTACT GUARD ASSIST
ASSISTIVE DEVICE: QUAD CANE
GAIT LEVEL OF ASSIST: CONTACT GUARD ASSIST
ASSISTIVE DEVICE: QUAD CANE
DISTANCE (FEET): 125

## 2023-07-10 ASSESSMENT — ACTIVITIES OF DAILY LIVING (ADL)
BED_CHAIR_WHEELCHAIR_TRANSFER_DESCRIPTION: SUPERVISION FOR SAFETY;ADAPTIVE EQUIPMENT
BED_CHAIR_WHEELCHAIR_TRANSFER_DESCRIPTION: SUPERVISION FOR SAFETY;SET-UP OF EQUIPMENT

## 2023-07-10 NOTE — PROGRESS NOTES
Hospital Medicine Daily Progress Note      Chief Complaint  Diabetes    Interval Problem Update  In good spirits, no complaints.    Review of Systems  Review of Systems   Constitutional:  Negative for chills and fever.   HENT: Negative.     Eyes: Negative.    Respiratory:  Negative for cough and shortness of breath.    Cardiovascular:  Negative for chest pain and palpitations.   Gastrointestinal:  Negative for abdominal pain, nausea and vomiting.   Genitourinary: Negative.    Musculoskeletal: Negative.    Skin:  Negative for itching and rash.   Neurological:  Positive for sensory change and focal weakness.   Endo/Heme/Allergies:  Negative for polydipsia. Does not bruise/bleed easily.        Physical Exam  Temp:  [36.4 °C (97.5 °F)-36.9 °C (98.5 °F)] 36.4 °C (97.5 °F)  Pulse:  [82-96] 83  Resp:  [16-18] 16  BP: (128-140)/(68-80) 140/80  SpO2:  [91 %-93 %] 93 %    Physical Exam  Vitals reviewed.   Constitutional:       General: She is not in acute distress.     Appearance: Normal appearance. She is not ill-appearing.   HENT:      Head: Normocephalic and atraumatic.      Right Ear: External ear normal.      Left Ear: External ear normal.      Nose: Nose normal.      Mouth/Throat:      Pharynx: Oropharynx is clear.   Eyes:      General:         Right eye: No discharge.         Left eye: No discharge.      Extraocular Movements: Extraocular movements intact.      Conjunctiva/sclera: Conjunctivae normal.   Cardiovascular:      Rate and Rhythm: Normal rate and regular rhythm.   Pulmonary:      Effort: Pulmonary effort is normal. No respiratory distress.      Breath sounds: Normal breath sounds. No wheezing.   Abdominal:      General: Bowel sounds are normal. There is no distension.      Palpations: Abdomen is soft.      Tenderness: There is no abdominal tenderness.   Musculoskeletal:      Cervical back: Normal range of motion and neck supple.      Right lower leg: No edema.      Left lower leg: No edema.   Skin:      General: Skin is warm and dry.   Neurological:      Mental Status: She is alert and oriented to person, place, and time.         Fluids    Intake/Output Summary (Last 24 hours) at 7/10/2023 1108  Last data filed at 7/10/2023 0710  Gross per 24 hour   Intake 480 ml   Output --   Net 480 ml       Laboratory  Recent Labs     07/08/23  0512   WBC 8.1   RBC 5.21   HEMOGLOBIN 15.9   HEMATOCRIT 47.0   MCV 90.2   MCH 30.5   MCHC 33.8   RDW 39.3   PLATELETCT 172   MPV 11.1     Recent Labs     07/08/23  0512   SODIUM 140   POTASSIUM 4.0   CHLORIDE 102   CO2 26   GLUCOSE 164*   BUN 8   CREATININE 0.51   CALCIUM 9.4                   Assessment/Plan  * Ischemic stroke (HCC)- (present on admission)  Assessment & Plan  Presented w/ dysarthria and R HP  On ASA, Plavix, and Lipitor  Also has ZioPatch  Management per Physiatry    Hypertension  Assessment & Plan  Blood pressures trending up  Will start Lisinopril    Type 2 diabetes mellitus without complication, without long-term current use of insulin (HCC)- (present on admission)  Assessment & Plan  HbA1c 12.6  Started low dose Metformin, titrate up to effect  Continue Lantus and SSI for now  Outpt meds include Metformin (but has not taken in >8 years because pt was not getting regular medical care)  Goal is to discharge from Rehab on PO meds only    Tobacco abuse- (present on admission)  Assessment & Plan  On Dulera  RT protocol       Full Code    Discussed w/ pt and Dr. Black

## 2023-07-10 NOTE — CARE PLAN
Problem: Eating  Goal: STG-Within one week, patient will feed self at Mod I level using AE PRN.  Outcome: Not Met  Note: Requires set-up assist     Problem: Grooming  Goal: STG-Within one week, patient will complete grooming at SPV level using AE PRN.  Outcome: Met     Problem: Bathing  Goal: STG-Within one week, patient will bathe at SBA using DME/AE PRN.  Outcome: Not Met  Note: Requires CGA     Problem: Dressing  Goal: STG-Within one week, patient will dress LB at CGA level using DME/AE PRN.  Outcome: Met     Problem: Toileting  Goal: STG-Within one week, patient will complete toileting tasks at CGA level using DME PRN.  Outcome: Not Met  Note: Requires Min A     Problem: Functional Transfers  Goal: STG-Within one week, patient will transfer to toilet at SBA level using DME PRN.  Outcome: Not Met  Note: Requires CGA with use quad cane

## 2023-07-10 NOTE — PROGRESS NOTES
Rehab Progress Note     Date of Service: 7/10/2023  Chief Complaint: Follow-up stroke    Interval Events (Subjective)    Patient seen and examined today in her room.  Her right arm is made excellent recovery in its movement since last week.  She denies any pain.  She reports she has been able to walk with a cane.  She reports not having any prescription coverage so wants to make sure she can pay for her medications at discharge.  She feels like her diabetes has had more impact on her stroke rather than her smoking.  She last moved her bowels yesterday.  Weekly conference scheduled for this afternoon to discuss a discharge date.    Patient has no other new questions, concerns, or complaints today.       Objective:  VITAL SIGNS: BP (!) 140/80   Pulse 83   Temp 36.4 °C (97.5 °F) (Oral)   Resp 16   Ht 1.524 m (5')   Wt 79.8 kg (176 lb)   SpO2 93%   BMI 34.37 kg/m²   Gen: alert, no apparent distress  CV: Regular rate, regular rhythm  Resp: Loose productive cough, breathing comfortably on room air  Neuro: Improving right hemiparesis    Recent Results (from the past 72 hour(s))   POCT glucose device results    Collection Time: 07/07/23 11:20 AM   Result Value Ref Range    POC Glucose, Blood 179 (H) 65 - 99 mg/dL   POCT glucose device results    Collection Time: 07/07/23  5:24 PM   Result Value Ref Range    POC Glucose, Blood 175 (H) 65 - 99 mg/dL   POCT glucose device results    Collection Time: 07/07/23  9:06 PM   Result Value Ref Range    POC Glucose, Blood 231 (H) 65 - 99 mg/dL   CBC WITHOUT DIFFERENTIAL    Collection Time: 07/08/23  5:12 AM   Result Value Ref Range    WBC 8.1 4.8 - 10.8 K/uL    RBC 5.21 4.20 - 5.40 M/uL    Hemoglobin 15.9 12.0 - 16.0 g/dL    Hematocrit 47.0 37.0 - 47.0 %    MCV 90.2 81.4 - 97.8 fL    MCH 30.5 27.0 - 33.0 pg    MCHC 33.8 32.2 - 35.5 g/dL    RDW 39.3 35.9 - 50.0 fL    Platelet Count 172 164 - 446 K/uL    MPV 11.1 9.0 - 12.9 fL   Basic Metabolic Panel    Collection Time: 07/08/23   5:12 AM   Result Value Ref Range    Sodium 140 135 - 145 mmol/L    Potassium 4.0 3.6 - 5.5 mmol/L    Chloride 102 96 - 112 mmol/L    Co2 26 20 - 33 mmol/L    Glucose 164 (H) 65 - 99 mg/dL    Bun 8 8 - 22 mg/dL    Creatinine 0.51 0.50 - 1.40 mg/dL    Calcium 9.4 8.5 - 10.5 mg/dL    Anion Gap 12.0 7.0 - 16.0   PHOSPHORUS    Collection Time: 07/08/23  5:12 AM   Result Value Ref Range    Phosphorus 3.9 2.5 - 4.5 mg/dL   ESTIMATED GFR    Collection Time: 07/08/23  5:12 AM   Result Value Ref Range    GFR (CKD-EPI) 101 >60 mL/min/1.73 m 2   POCT glucose device results    Collection Time: 07/08/23  7:19 AM   Result Value Ref Range    POC Glucose, Blood 189 (H) 65 - 99 mg/dL   POCT glucose device results    Collection Time: 07/08/23 11:04 AM   Result Value Ref Range    POC Glucose, Blood 230 (H) 65 - 99 mg/dL   POCT glucose device results    Collection Time: 07/08/23  4:53 PM   Result Value Ref Range    POC Glucose, Blood 117 (H) 65 - 99 mg/dL   POCT glucose device results    Collection Time: 07/08/23  9:12 PM   Result Value Ref Range    POC Glucose, Blood 184 (H) 65 - 99 mg/dL   POCT glucose device results    Collection Time: 07/09/23  7:51 AM   Result Value Ref Range    POC Glucose, Blood 154 (H) 65 - 99 mg/dL   POCT glucose device results    Collection Time: 07/09/23 11:18 AM   Result Value Ref Range    POC Glucose, Blood 151 (H) 65 - 99 mg/dL   POCT glucose device results    Collection Time: 07/09/23  5:13 PM   Result Value Ref Range    POC Glucose, Blood 186 (H) 65 - 99 mg/dL   POCT glucose device results    Collection Time: 07/09/23 10:12 PM   Result Value Ref Range    POC Glucose, Blood 174 (H) 65 - 99 mg/dL   POCT glucose device results    Collection Time: 07/10/23  7:08 AM   Result Value Ref Range    POC Glucose, Blood 168 (H) 65 - 99 mg/dL       Scheduled Medications   Medication Dose Frequency    metFORMIN  500 mg BID WITH MEALS    insulin lispro  2-12 Units 4X/DAY ACHS    insulin GLARGINE  18 Units QAM INSULIN     aspirin  81 mg DAILY    atorvastatin  40 mg Q EVENING    clopidogrel  75 mg DAILY    enoxaparin (LOVENOX) injection  40 mg DAILY AT 1800    lidocaine  2 Patch DAILY    nicotine  21 mg Daily-0600    senna-docusate  2 Tablet BID    nystatin  5 mL 4X/DAY    mometasone-formoterol  2 Puff BID (RT)    simethicone  125 mg TID WITH MEALS    loratadine  10 mg DAILY       Current Diet Order   Procedures    Diet Order Diet: Level 7 - Easy to Chew (meds whole with thins, Please add a T- rocker knife to all plates.); Liquid level: Level 0 - Thin; Second Modifier: (optional): Consistent CHO (Diabetic)       Assessment:    This patient is a 67 y.o. female admitted for acute inpatient rehabilitation with Ischemic stroke (HCC).      I, Unique Black M.D./MD., was present and led the interdisciplinary team conference on 7/10/2023.  I led the IDT conference and agree with the IDT conference documentation and plan of care as noted below.     Nursing:  Diet Current Diet Order   Procedures    Diet Order Diet: Level 7 - Easy to Chew (meds whole with thins, Please add a T- rocker knife to all plates.); Liquid level: Level 0 - Thin; Second Modifier: (optional): Consistent CHO (Diabetic)       Eating ADL Supervision  Set-up of equipment or meal/tube feeding (See note for self-feeding eval details.)   % of Last Meal  Oral Nutrition: *  * Meal *  *, Dinner, Between % Consumed   Sleep No issues   Bowel Last BM: 07/09/23   Bladder Post Void  82   Barriers to Discharge Home: diabetic education      Physical Therapy:  Bed Mobility    Transfers Contact Guard Assist  Supervision for safety, Adaptive equipment (Stand pivot using quad cane from bed to w/c.)   Mobility Contact Guard Assist   Stairs Minimal Assist (CG/Mc)   Barriers to Discharge Home: stairs at home      Occupational Therapy:  Grooming Standby Assist (Seated in w/c)   Bathing Contact Guard Assist   UB Dressing Stand by Assist   LB Dressing Contact Guard Assist    Toileting Moderate Assist   Shower & Tub Transfer Contact Guard Assist   Barriers to Discharge Home: right arm weakness    Respiratory Therapy:  O2 (LPM): 0  O2 Delivery Device: None - Room Air    Case Management:  Continues to work on disposition and DME needs.      Discharge Date/Disposition:    7/17    HH: PT/OT/RN    Equip: shower bench, small based quad cane    Follow-ups: PCP, stroke bridge clinic, Dr. Haines      Problem List/Medical Decision Making and Plan:    Left MCA stroke  Right hemiparesis  PT/OT, 1.5 hr each discipline, 5 days per week    Continue aspirin and Plavix for 10 days, then Plavix alone    Continue atorvastatin    Patient has cardiac monitor in place, to be returned 7/19    Outpatient follow-up with stroke [facility] clinic and Dr. Haines, referrals made    Diabetes with hyperglycemia  Continue metformin  Continue glargine and sliding scale insulin  Goal to get off insulin prior to discharge  Appreciate hospitalist assistance  Needs glucometer      Tobacco abuse  Continue nicotine patch and gum  Will need counseling    Hypertension  Started on lisinopril     Upper back pain  Continue Lidoderm patches    Oral thrush  Continue nystatin     Seasonal allergies  Continue Claritin     Respiratory failure with hypoxia  Likely due to undiagnosed COPD  Continue Dulera  Titrated off oxygen     Abdominal gas  Continue simethicone    Constipation  Continue Senokot  Last bowel movement 7/9    DVT prophylaxis  Continue Lovenox    Unique Black M.D.  Physical Medicine and Rehabilitation         PRINCIPAL PROCEDURE  Procedure: Lumbar laminectomy with foraminotomy  Findings and Treatment: L3-L5 laminectomy, facetectomy, foraminotomy

## 2023-07-10 NOTE — PROGRESS NOTES
NURSING DAILY NOTE    Name: Nyasia Schuster   Date of Admission: 7/5/2023   Admitting Diagnosis: Ischemic stroke (HCC)  Attending Physician: Unique Black M.d.  Allergies: Patient has no known allergies.    Safety  Patient Assist  Min Assist  Patient Precautions  Fall Risk  Precaution Comments  1L O2 wean, zio patch (currently not working)  Bed Transfer Status  Contact Guard Assist  Toilet Transfer Status   Contact Guard Assist  Assistive Devices  Rails, Wheelchair  Oxygen  None - Room Air  Diet/Therapeutic Dining  Current Diet Order   Procedures    Diet Order Diet: Level 7 - Easy to Chew (meds whole with thins, Please add a T- rocker knife to all plates.); Liquid level: Level 0 - Thin; Second Modifier: (optional): Consistent CHO (Diabetic)     Pill Administration  whole  Agitated Behavioral Scale  14  ABS Level of Severity  No Agitation    Fall Risk  Has the patient had a fall this admission?   No  Deanne Cheng Fall Risk Scoring  11, MODERATE RISK  Fall Risk Safety Measures  bed alarm and strip alarm    Vitals  Temperature: 36.6 °C (97.9 °F)  Temp src: Oral  Pulse: 96  Respiration: 18  Blood Pressure : 128/68  Blood Pressure MAP (Calculated): 88 MM HG  BP Location: Left, Upper Arm  Patient BP Position: Sitting     Oxygen  Pulse Oximetry: 93 %  O2 (LPM): 0  O2 Delivery Device: None - Room Air  Incentive Spirometer Volume: 1000 mL    Bowel and Bladder  Last Bowel Movement  07/09/23  Stool Type  Type 6: Fluffy pieces with ragged edges, a mushy stool  Bowel Device  Bathroom, Other (Comment) (Bowel Meds)  Continent  Bladder: Continent void   Bowel: Continent movement  Bladder Function  Urine Void (mL):  (moderate)  Number of Times Voided: 1  Urine Color: Yellow  Number of Times Incontinent of Urine: 0  Genitourinary Assessment   Bladder Assessment (WDL):  WDL Except  Bhatt Catheter: Not Applicable  Urine Color: Yellow  Number of Bladder Accidents:  0  Total Number of Bladder of Accidents in Last 7 Days: 0  Number of Times Incontinent of Urine: 0  Bladder Device: Bathroom  Bladder Scan: Post Void  $ Bladder Scan Results (mL): 82    Skin  Parveen Score   18  Sensory Interventions   Bed Types: Standard Mattress  Skin Preventative Measures: Pillows in Use for Support / Positioning  Moisture Interventions         Pain  Pain Rating Scale  0 - No Pain  Pain Location  Generalized  Pain Location Orientation     Pain Interventions   Declines    ADLs    Bathing   Shower, Staff (OT)  Linen Change      Personal Hygiene  Perineal Care (HANDWASHED)  Chlorhexidine Bath      Oral Care  Brushed Teeth  Teeth/Dentures     Shave     Nutrition Percentage Eaten  *  * Meal *  *, Dinner, Between % Consumed  Environmental Precautions  Treaded Slipper Socks on Patient, Bed in Low Position  Patient Turns/Positioning  Patient Turns Self from Side to Side  Patient Turns Assistance/Tolerance  Assistance of One, General Weakness  Bed Positions  Bed Controls On, Bed Locked  Head of Bed Elevated  Self regulated      Psychosocial/Neurologic Assessment  Psychosocial Assessment  Psychosocial (WDL):  Within Defined Limits  Neurologic Assessment  Neuro (WDL): Exceptions to WDL  Level of Consciousness: Alert  Orientation Level: Oriented X4  Cognition: Appropriate judgement, Appropriate safety awareness, Appropriate attention/concentration, Appropriate for developmental age  Speech: Clear  Pupil Assesment: Yes  R Pupil Size (mm): 3  R Pupil Shape / Description: Round  R Pupil Reaction: Brisk  L Pupil Size (mm): 3  L Pupil Shape / Description: Round  L Pupil Reaction: Brisk  Motor Function/Sensation Assessment: Sensation  RUE Sensation: Full sensation  Muscle Strength Right Arm: Fair Strength against Gravity but No Resistance  LUE Sensation: Full sensation  RLE Sensation: Full sensation  Muscle Strength Right Leg: Fair Strength against Gravity but No Resistance  LLE Sensation: Full  sensation  EENT (WDL):  WDL Except    Cardio/Pulmonary Assessment  Edema      Respiratory Breath Sounds  RUL Breath Sounds: Clear, Diminished  RML Breath Sounds: Clear, Diminished  RLL Breath Sounds: Clear, Diminished  OPAL Breath Sounds: Clear, Diminished  LLL Breath Sounds: Clear, Diminished  Cardiac Assessment   Cardiac (WDL):  WDL Except

## 2023-07-10 NOTE — CARE PLAN
Problem: Balance  Goal: STG-Within one week, patient will tolerate Delgadillo Balance Scale assessment.  Outcome: Not Met     Problem: Mobility  Goal: STG-Within one week, patient will ambulate up/down flight of stairs with LUE support and SBA-CGA.  Outcome: Progressing     Problem: Mobility Transfers  Goal: STG-Within one week, patient will perform bed mobility independently.  Outcome: Progressing     Problem: Mobility  Goal: STG-Within one week, patient will ambulate household distance 150 ft with LRAD and CGA.  Outcome: Met     Problem: Mobility Transfers  Goal: STG-Within one week, patient will transfer bed to chair with LRAD and SBA-CGA.  Outcome: Met

## 2023-07-10 NOTE — THERAPY
"Occupational Therapy  Daily Treatment     Patient Name: Nyasia Schuster  Age:  67 y.o., Sex:  female  Medical Record #: [id number]  Today's Date: 7/10/2023     Precautions  Precautions: (P) Fall Risk  Comments: (P) 1L O2 wean, zio patch (currently not working)         Subjective    Pt seated in w/c upon arrival. Pleasant and cooperative, agreeable to therapy. \"I am determined.\"    \"I can scratch my arm today!\"     Objective       07/10/23 0831   OT Total Time Spent   OT Individual Total Time Spent (Mins) 60   Precautions   Precautions Fall Risk   Comments 1L O2 wean, zio patch (currently not working)   Pain   Intervention Declines   Cognition    Level of Consciousness Alert   Sleep/Wake Cycle   Sleep & Rest Awake   Functional Level of Assist   Grooming Standby Assist  (Seated in w/c)   Grooming Description Adaptive equipment;Increased time;Seated in wheelchair at sink;Supervision for safety  (Able to open toothpaste on own)   Bathing Contact Guard Assist   Bathing Description Adaptive equipment;Grab bar;Hand held shower;Tub bench;Supervision for safety   Upper Body Dressing Stand by Assist   Upper Body Dressing Description Increased time;Supervision for safety  (Able to stiven t-shirt seated in w/c.)   Lower Body Dressing Contact Guard Assist   Lower Body Dressing Description Increased time;Supervision for safety  (Pt able to stiven shorts using heather techniques with R palm. Seated in w/c and using quad cane for standing balance.)   Bed, Chair, Wheelchair Transfer Contact Guard Assist   Bed Chair Wheelchair Transfer Description Supervision for safety;Adaptive equipment  (Stand pivot using quad cane from bed to w/c.)   Tub / Shower Transfers Contact Guard Assist   Tub Shower Transfer Description Adaptive equipment;Supervision for safety;Increased time;Grab bar  (Ambulated to fold down bench using quad cane.)   Neuro-Muscular Treatments   Neuro-Muscular Treatments Electrical Stimulation   Comments NMES applied over wrist " extensors for 10 min with good tolerance and muscle activation noted.   Interdisciplinary Plan of Care Collaboration   Patient Position at End of Therapy Seated;Chair Alarm On;Self Releasing Lap Belt Applied;Call Light within Reach;Tray Table within Reach;Phone within Reach     Functional Mobility- Pt ambulated from w/c <> fold down bench in bathroom using quad cane and CGA. Pt able to tuck clean clothes into armpit and carry while ambulating.     IADL- Pt put away clean clothes into closet from bed using quad cane and CGA. Pt used bed rail to WB through R UE while putting clothes away.     Home set up- Pt's has jacuzzi bathtub that is higher and wider than avg bathtub, is worried that regular tub transfer bench will not fit. Pt has GB to install.    Assessment    Pt has good safety awareness while completing shower and ambulating. Pt R digit strength and ROM impaired, but Pt able to complete shower and donning loose clothing without physical assistance from therapist. R shld and wrist strength improving.     Strengths: Able to follow instructions, Alert and oriented, Effective communication skills, Good carryover of learning, Independent prior level of function, Good insight into deficits/needs, Making steady progress towards goals, Motivated for self care and independence, Pleasant and cooperative, Supportive family, Willingly participates in therapeutic activities  Barriers: Bowel incontinence, Decreased endurance, Fatigue, Generalized weakness, Hemiplegia, Home accessibility, Impaired activity tolerance, Impaired balance, Limited mobility    Plan    Continue R UE neuro re-ed. ADL/IADL training using quad cane. Discuss DME options for jacuzzi tub at home.    Passport items to be completed:  Perform bathroom transfers, complete dressing, complete feeding, get ready for the day, prepare a simple meal, participate in household tasks, adapt home for safety needs, demonstrate home exercise program, complete caregiver  training     Occupational Therapy Goals (Active)       Problem: Bathing       Dates: Start:  07/06/23         Goal: STG-Within one week, patient will bathe at SBA using DME/AE PRN.       Dates: Start:  07/06/23               Problem: Dressing       Dates: Start:  07/06/23         Goal: STG-Within one week, patient will dress LB at CGA level using DME/AE PRN.       Dates: Start:  07/06/23               Problem: Eating       Dates: Start:  07/06/23         Goal: STG-Within one week, patient will feed self at Mod I level using AE PRN.       Dates: Start:  07/06/23               Problem: Functional Transfers       Dates: Start:  07/06/23         Goal: STG-Within one week, patient will transfer to toilet at SBA level using DME PRN.       Dates: Start:  07/06/23               Problem: Grooming       Dates: Start:  07/06/23         Goal: STG-Within one week, patient will complete grooming at SPV level using AE PRN.       Dates: Start:  07/06/23               Problem: OT Long Term Goals       Dates: Start:  07/06/23         Goal: LTG-By discharge, patient will complete basic self care tasks at SBA-Mod I level using DME/AE PRN.       Dates: Start:  07/06/23            Goal: LTG-By discharge, patient will perform bathroom transfers at SPV-Mod I level using DME/AE PRN.       Dates: Start:  07/06/23            Goal: LTG-By discharge, patient will complete basic home management at Min A-Mod I level using DME/AE PRN.       Dates: Start:  07/06/23               Problem: Toileting       Dates: Start:  07/06/23         Goal: STG-Within one week, patient will complete toileting tasks at CGA level using DME PRN.       Dates: Start:  07/06/23

## 2023-07-10 NOTE — THERAPY
"Physical Therapy   Daily Treatment     Patient Name: Nyasia Schuster  Age:  67 y.o., Sex:  female  Medical Record #: [id number]  Today's Date: 7/10/2023     Precautions  Precautions: Fall Risk  Comments: zio patch (currently no working)    Subjective    \"I'm good\"     Objective       07/10/23 1501   PT Charge Group   PT Gait Training (Units) 2   PT Neuromuscular Re-Education / Balance (Units) 2   PT Total Time Spent   PT Individual Total Time Spent (Mins) 60   Pain   Intervention Declines   Gait Functional Level of Assist    Gait Level Of Assist Contact Guard Assist  (progressing to SBA)   Assistive Device Quad Cane   Distance (Feet) 200   # of Times Distance was Traveled 1   Deviation Step To;Bradykinetic;Decreased Toe Off;Decreased Base Of Support  (cues for larger L step length, intermittent R hyperextension with fatigue. Pt encouraged to increase pace of ambulation with little improvement)   Sitting Lower Body Exercises   Ankle Pumps 3 sets of 10;Bilateral   Long Arc Quad 1 set of 10;Bilateral   Marching Reciprocal;1 set of 10   Bed Mobility    Sit to Stand Standby Assist  (to quad cane)   Interdisciplinary Plan of Care Collaboration   IDT Collaboration with  Physical Therapist Assistant (PTA)   Patient Position at End of Therapy Seated;Chair Alarm On;Call Light within Reach     Neuro Re-ed:    Obstacle course (weaving between bolsters, step up to 2\" foam, and toe taps to small cone), performed with quad cane and CG. Minor unsteadiness on step ups, no LOB    Walking on soft mat with quad cane and CG, no unsteadiness noted.     40ft retroambulation, 40ft tandem stance ambulation, 40ft B side steps - all with quad cane and CG, intermittent Mc d/t unsteadiness. Cues for R knee control and to avoid hyperextension.     Step over and back over pool noodle with CG and quad cane, performed with each LE x multiple bouts. Cues for quad control and control of movement    Pt plays ladder ball without UE support and CG/CS " at trunk. Good upright stance and stability, minor R knee flexion with fatigue but self-corrected  - progressed to performing on foam with increased unsteadiness but good ankle strategy balance recovery without assistance    Pt stands on foam with normal JELENA and performs horizontal and vertical head turns, no increase in unsteadiness, good use of ankle strategy for balance  - 5x squats without UE support and CG for safety. Good control noted.     Assessment    Pt motivated throughout but endorses fatigue from earlier in the day. This session focused on RLE stance stability and balance training.     Strengths: Able to follow instructions, Alert and oriented, Good insight into deficits/needs, Independent prior level of function, Manages pain appropriately, Motivated for self care and independence, Pleasant and cooperative, Supportive family, Willingly participates in therapeutic activities  Barriers: Decreased endurance, Fatigue, Hemiparesis, Home accessibility, Impaired activity tolerance, Impaired balance, Limited mobility    Plan      Ambulation w/ LRAD(trail SPC or SBQC), activity tolerance, standing balance, check O2 PRN    Passport items to be completed:  Get in/out of bed safely, in/out of a vehicle, safely use mobility device, walk or wheel around home/community, navigate up and down stairs, show how to get up/down from the ground, ensure home is accessible, demonstrate HEP, complete caregiver training    Physical Therapy Problems (Active)       Problem: Balance       Dates: Start:  07/06/23         Goal: STG-Within one week, patient will tolerate Delgadillo Balance Scale assessment.       Dates: Start:  07/06/23               Problem: Mobility       Dates: Start:  07/06/23         Goal: STG-Within one week, patient will ambulate up/down flight of stairs with LUE support and SBA-CGA.       Dates: Start:  07/06/23               Problem: Mobility Transfers       Dates: Start:  07/06/23         Goal: STG-Within one  week, patient will perform bed mobility independently.       Dates: Start:  07/06/23               Problem: PT-Long Term Goals       Dates: Start:  07/06/23         Goal: LTG-By discharge, patient will ambulate household distances mod I with LRAD, community distances supervised with LRAD.       Dates: Start:  07/06/23            Goal: LTG-By discharge, patient will transfer one surface to another with LRAD mod I.       Dates: Start:  07/06/23            Goal: LTG-By discharge, patient will ambulate up/down flight of stairs with LUE support and supervision.       Dates: Start:  07/06/23            Goal: LTG-By discharge, patient will transfer in/out of a car with LRAD and supervision.       Dates: Start:  07/06/23

## 2023-07-10 NOTE — FLOWSHEET NOTE
07/10/23 0759   Events/Summary/Plan   Events/Summary/Plan SpO2 check, MDI   Vital Signs   Pulse 83   Respiration 16   Pulse Oximetry 93 %   $ Pulse Oximetry (Spot Check) Yes   Respiratory Assessment   Respiratory Pattern Within Normal Limits   Level of Consciousness Alert   Chest Exam   Work Of Breathing / Effort Within Normal Limits   Oxygen   O2 Delivery Device None - Room Air

## 2023-07-10 NOTE — PROGRESS NOTES
Patient care assumed. Report received from [facility] CELENA Hicks. Patient is alert and calm, resting in bed. Call light and bedside table within reach. Will continue to monitor.

## 2023-07-10 NOTE — PROGRESS NOTES
Received bedside shift report from Veda CELAYA RN regarding patient and assumed care. Patient awake, calm and stable, currently positioned in bed for comfort and safety; call light within reach. Denies pain or discomfort at this time. Will continue to monitor.

## 2023-07-10 NOTE — CARE PLAN
The patient is Stable - Low risk of patient condition declining or worsening    Shift Goals  Clinical Goals: safety  Patient Goals: safety    Problem: Fall Risk - Rehab  Goal: Patient will remain free from falls  Outcome: Progressing Pt uses call light consistently and appropriately. Waits for assistance does not attempt self transfer this shift. Able to verbalize needs.      Problem: Pain - Standard  Goal: Alleviation of pain or a reduction in pain to the patient’s comfort goal  Outcome: Progressing Patient able to verbalize pain level and verbalize an acceptable level of pain.

## 2023-07-10 NOTE — CARE PLAN
"  Problem: Knowledge Deficit - Standard  Goal: Patient and family/care givers will demonstrate understanding of plan of care, disease process/condition, diagnostic tests and medications  Outcome: Progressing  Note: Pt agrees with plan of care tonight regarding medications and safety.  Will continue to monitor patient.      Problem: Fall Risk - Rehab  Goal: Patient will remain free from falls  Outcome: Progressing  Note: eDanne Cheng Fall risk Assessment Score:  11      Moderate fall risk Interventions  - Bed and strip alarm   - Yellow sign by the door   - Yellow wrist band \"Fall risk\"  - Room near to the nurse station  - Do not leave patient unattended in the bathroom  - Fall risk education provided           The patient is Stable - Low risk of patient condition declining or worsening    Shift Goals  Clinical Goals: Safety  Patient Goals: Sleep well    Progress made toward(s) clinical / shift goals:  progressing          "

## 2023-07-11 ENCOUNTER — APPOINTMENT (OUTPATIENT)
Dept: PHYSICAL THERAPY | Facility: REHABILITATION | Age: 68
DRG: 056 | End: 2023-07-11
Attending: PHYSICAL MEDICINE & REHABILITATION
Payer: MEDICARE

## 2023-07-11 ENCOUNTER — APPOINTMENT (OUTPATIENT)
Dept: OCCUPATIONAL THERAPY | Facility: REHABILITATION | Age: 68
DRG: 056 | End: 2023-07-11
Attending: PHYSICAL MEDICINE & REHABILITATION
Payer: MEDICARE

## 2023-07-11 LAB
GLUCOSE BLD STRIP.AUTO-MCNC: 118 MG/DL (ref 65–99)
GLUCOSE BLD STRIP.AUTO-MCNC: 148 MG/DL (ref 65–99)
GLUCOSE BLD STRIP.AUTO-MCNC: 164 MG/DL (ref 65–99)
GLUCOSE BLD STRIP.AUTO-MCNC: 166 MG/DL (ref 65–99)

## 2023-07-11 PROCEDURE — A9270 NON-COVERED ITEM OR SERVICE: HCPCS | Performed by: HOSPITALIST

## 2023-07-11 PROCEDURE — 99232 SBSQ HOSP IP/OBS MODERATE 35: CPT | Performed by: PHYSICAL MEDICINE & REHABILITATION

## 2023-07-11 PROCEDURE — 94640 AIRWAY INHALATION TREATMENT: CPT

## 2023-07-11 PROCEDURE — 97530 THERAPEUTIC ACTIVITIES: CPT

## 2023-07-11 PROCEDURE — 97112 NEUROMUSCULAR REEDUCATION: CPT

## 2023-07-11 PROCEDURE — 82962 GLUCOSE BLOOD TEST: CPT | Mod: 91

## 2023-07-11 PROCEDURE — 770010 HCHG ROOM/CARE - REHAB SEMI PRIVAT*

## 2023-07-11 PROCEDURE — 700102 HCHG RX REV CODE 250 W/ 637 OVERRIDE(OP): Performed by: PHYSICAL MEDICINE & REHABILITATION

## 2023-07-11 PROCEDURE — 700111 HCHG RX REV CODE 636 W/ 250 OVERRIDE (IP): Mod: JZ | Performed by: PHYSICAL MEDICINE & REHABILITATION

## 2023-07-11 PROCEDURE — A9270 NON-COVERED ITEM OR SERVICE: HCPCS | Performed by: PHYSICAL MEDICINE & REHABILITATION

## 2023-07-11 PROCEDURE — 700102 HCHG RX REV CODE 250 W/ 637 OVERRIDE(OP): Performed by: HOSPITALIST

## 2023-07-11 PROCEDURE — 99232 SBSQ HOSP IP/OBS MODERATE 35: CPT | Performed by: HOSPITALIST

## 2023-07-11 PROCEDURE — 97110 THERAPEUTIC EXERCISES: CPT

## 2023-07-11 PROCEDURE — 97116 GAIT TRAINING THERAPY: CPT

## 2023-07-11 RX ADMIN — INSULIN LISPRO 2 UNITS: 100 INJECTION, SOLUTION INTRAVENOUS; SUBCUTANEOUS at 07:33

## 2023-07-11 RX ADMIN — METFORMIN HYDROCHLORIDE 850 MG: 850 TABLET ORAL at 17:55

## 2023-07-11 RX ADMIN — CLOPIDOGREL BISULFATE 75 MG: 75 TABLET ORAL at 08:20

## 2023-07-11 RX ADMIN — NYSTATIN 500000 UNITS: 100000 SUSPENSION ORAL at 07:40

## 2023-07-11 RX ADMIN — NYSTATIN 500000 UNITS: 100000 SUSPENSION ORAL at 20:22

## 2023-07-11 RX ADMIN — ASPIRIN 81 MG CHEWABLE TABLET 81 MG: 81 TABLET CHEWABLE at 08:20

## 2023-07-11 RX ADMIN — ACETAMINOPHEN 650 MG: 325 TABLET ORAL at 20:22

## 2023-07-11 RX ADMIN — SENNOSIDES AND DOCUSATE SODIUM 2 TABLET: 50; 8.6 TABLET ORAL at 08:20

## 2023-07-11 RX ADMIN — ATORVASTATIN CALCIUM 40 MG: 40 TABLET, FILM COATED ORAL at 20:22

## 2023-07-11 RX ADMIN — NYSTATIN 500000 UNITS: 100000 SUSPENSION ORAL at 12:59

## 2023-07-11 RX ADMIN — METFORMIN HYDROCHLORIDE 850 MG: 850 TABLET ORAL at 09:24

## 2023-07-11 RX ADMIN — MOMETASONE FUROATE AND FORMOTEROL FUMARATE DIHYDRATE 2 PUFF: 200; 5 AEROSOL RESPIRATORY (INHALATION) at 10:02

## 2023-07-11 RX ADMIN — LORATADINE 10 MG: 10 TABLET ORAL at 08:20

## 2023-07-11 RX ADMIN — INSULIN LISPRO 2 UNITS: 100 INJECTION, SOLUTION INTRAVENOUS; SUBCUTANEOUS at 20:42

## 2023-07-11 RX ADMIN — LISINOPRIL 5 MG: 5 TABLET ORAL at 05:31

## 2023-07-11 RX ADMIN — NYSTATIN 500000 UNITS: 100000 SUSPENSION ORAL at 17:55

## 2023-07-11 RX ADMIN — ENOXAPARIN SODIUM 40 MG: 100 INJECTION SUBCUTANEOUS at 17:55

## 2023-07-11 RX ADMIN — MOMETASONE FUROATE AND FORMOTEROL FUMARATE DIHYDRATE 2 PUFF: 200; 5 AEROSOL RESPIRATORY (INHALATION) at 20:42

## 2023-07-11 RX ADMIN — NICOTINE TRANSDERMAL SYSTEM 21 MG: 21 PATCH, EXTENDED RELEASE TRANSDERMAL at 05:31

## 2023-07-11 ASSESSMENT — BALANCE ASSESSMENTS
PLACE ALTERNATE FOOT ON STEP OR STOOL WHILE STANDING UNSUPPORTED: 2
STANDING UNSUPPORTED WITH EYES CLOSED: 3
STANDING UNSUPPORTED ONE FOOT IN FRONT: 2
SITTING UNSUPPORTED: 4
MEDICARE IMPAIRMENT PERCENTAGE: 25
LOOK OVER LEFT AND RIGHT SHOULDERS WHILE STANDING: 2
REACHING FORWARD WITH OUTSTRETCHED ARM WHILE STANDING: 3
SITTING TO STANDING: 4
TURN 360 DEGREES: 1
STANDING ON ONE LEG: 4
STANDING UNSUPPORTED: 3
TRANSFERS: 4
LONG VERSION TOTAL SCORE (MAX 56): 42
STANDING UNSUPPORTED WITH FEET TOGETHER: 3
STANDING TO SITTING: 4
LONG VERSION TOTAL SCORE (MAX 56): 42
PICK UP OBJECT FROM THE FLOOR FROM A STANDING POSITION: 3

## 2023-07-11 ASSESSMENT — PATIENT HEALTH QUESTIONNAIRE - PHQ9
1. LITTLE INTEREST OR PLEASURE IN DOING THINGS: NOT AT ALL
SUM OF ALL RESPONSES TO PHQ9 QUESTIONS 1 AND 2: 0
2. FEELING DOWN, DEPRESSED, IRRITABLE, OR HOPELESS: NOT AT ALL

## 2023-07-11 ASSESSMENT — ACTIVITIES OF DAILY LIVING (ADL): BED_CHAIR_WHEELCHAIR_TRANSFER_DESCRIPTION: ADAPTIVE EQUIPMENT

## 2023-07-11 ASSESSMENT — ENCOUNTER SYMPTOMS
DIARRHEA: 0
SHORTNESS OF BREATH: 0
VOMITING: 0
NERVOUS/ANXIOUS: 0
CHILLS: 0
NAUSEA: 0
FEVER: 0
ABDOMINAL PAIN: 0

## 2023-07-11 ASSESSMENT — GAIT ASSESSMENTS
ASSISTIVE DEVICE: WHEELCHAIR PUSH
DISTANCE (FEET): 290
GAIT LEVEL OF ASSIST: CONTACT GUARD ASSIST
DEVIATION: DECREASED BASE OF SUPPORT;BRADYKINETIC;DECREASED HEEL STRIKE;DECREASED TOE OFF

## 2023-07-11 NOTE — PROGRESS NOTES
Received bedside shift report from Sweetie WHEELER RN regarding patient and assumed care. Patient awake, calm and stable, currently positioned in bed for comfort and safety; call light within reach. Denies pain or discomfort at this time. Will continue to monitor.

## 2023-07-11 NOTE — THERAPY
Physical Therapy   Daily Treatment     Patient Name: Nyasia Schuster  Age:  67 y.o., Sex:  female  Medical Record #: [id number]  Today's Date: 7/10/2023     Precautions  Precautions: Fall Risk  Comments: zio patch (currently no working)    Subjective    The sling feels good, but I was told that I don't need one because I am supposed to use my shoulder more.     Objective       07/10/23 1231   PT Charge Group   PT Gait Training (Units) 2   PT Therapeutic Exercise (Units) 1   PT Neuromuscular Re-Education / Balance (Units) 1   PT Total Time Spent   PT Individual Total Time Spent (Mins) 60   Precautions   Precautions Fall Risk   Comments zio patch (currently no working)   Pain   Intervention Declines   Cognition    Cognition / Consciousness WDL   ABS (Agitated Behavior Scale)   Agitated Behavior Scale Performed No   Sleep/Wake Cycle   Sleep & Rest Awake   Gait Functional Level of Assist    Gait Level Of Assist Contact Guard Assist   Assistive Device Quad Cane   Distance (Feet) 125   # of Times Distance was Traveled 3   Deviation Step To;Bradykinetic;Decreased Heel Strike;Decreased Toe Off;Decreased Base Of Support   Wheelchair Functional Level of Assist   Wheelchair Assist Stand by Assist   Distance Wheelchair (Feet or Distance) 150   Wheelchair Description Extra time;Supervision for safety   Transfer Functional Level of Assist   Bed, Chair, Wheelchair Transfer Contact Guard Assist   Bed Chair Wheelchair Transfer Description Supervision for safety;Set-up of equipment   Sitting Lower Body Exercises   Sit to Stand 2 sets of 10   Comments Mirror used for visual feedback to improve L-sided lean and scapular protraction R UE   Neuro-Muscular Treatments   Neuro-Muscular Treatments Biofeedback;Compensatory Strategies;Co-Contraction   Interdisciplinary Plan of Care Collaboration   IDT Collaboration with  Occupational Therapist   Patient Position at End of Therapy Seated;Chair Alarm On   Collaboration Comments sling options    Physical Therapist Assigned   Assigned PT / Treatment Time / Comments Brenda Macaroi     Toilet transfer completed with patient able to stand at sink at wash hands- no assist required for donning/doffing clothing for toileting.    Assessment    Patient able to complete household distance gait with SBQC, but unable to keep eyes-forward, with heavy reliance on looking at the ground for balance. Patient still talking through step pattern outloud- unable to multitask with gait training at this time. Patient reporting numbness in R hand with gait training- improvement with therapist supporting R UE at elbow. Patient may benefit from sling for community distance gait to decrease risk of subluxation until shoulder complex gets stronger. Education regarding positioning of shoulder when at rest to avoid overstretching and to protect joint- patient verbalized understanding. Patient motivated to go home as soon as possible. Very pleasant to work with.    Strengths: Able to follow instructions, Alert and oriented, Good insight into deficits/needs, Independent prior level of function, Manages pain appropriately, Motivated for self care and independence, Pleasant and cooperative, Supportive family, Willingly participates in therapeutic activities  Barriers: Decreased endurance, Fatigue, Hemiparesis, Home accessibility, Impaired activity tolerance, Impaired balance, Limited mobility    Plan    Ambulation w/ LRAD(SBQC), activity tolerance, standing balance, postural exercises, check O2 PRN    Passport items to be completed:  Get in/out of bed safely, in/out of a vehicle, safely use mobility device, walk or wheel around home/community, navigate up and down stairs, show how to get up/down from the ground, ensure home is accessible, demonstrate HEP, complete caregiver training    Physical Therapy Problems (Active)       Problem: Balance       Dates: Start:  07/06/23         Goal: STG-Within one week, patient will tolerate Delgadillo Balance  Scale assessment.       Dates: Start:  07/06/23               Problem: Mobility       Dates: Start:  07/06/23         Goal: STG-Within one week, patient will ambulate up/down flight of stairs with LUE support and SBA-CGA.       Dates: Start:  07/06/23               Problem: Mobility Transfers       Dates: Start:  07/06/23         Goal: STG-Within one week, patient will perform bed mobility independently.       Dates: Start:  07/06/23               Problem: PT-Long Term Goals       Dates: Start:  07/06/23         Goal: LTG-By discharge, patient will ambulate household distances mod I with LRAD, community distances supervised with LRAD.       Dates: Start:  07/06/23            Goal: LTG-By discharge, patient will transfer one surface to another with LRAD mod I.       Dates: Start:  07/06/23            Goal: LTG-By discharge, patient will ambulate up/down flight of stairs with LUE support and supervision.       Dates: Start:  07/06/23            Goal: LTG-By discharge, patient will transfer in/out of a car with LRAD and supervision.       Dates: Start:  07/06/23

## 2023-07-11 NOTE — CARE PLAN
"  Problem: Knowledge Deficit - Standard  Goal: Patient and family/care givers will demonstrate understanding of plan of care, disease process/condition, diagnostic tests and medications  Outcome: Progressing  Note: Pt agrees with plan of care tonight regarding medications and safety.  Will continue to monitor patient.      Problem: Fall Risk - Rehab  Goal: Patient will remain free from falls  Outcome: Progressing  Note: Deanne Cheng Fall risk Assessment Score:  11      Moderate fall risk Interventions  - Bed and strip alarm   - Yellow sign by the door   - Yellow wrist band \"Fall risk\"  - Room near to the nurse station  - Do not leave patient unattended in the bathroom  - Fall risk education provided           The patient is Stable - Low risk of patient condition declining or worsening    Shift Goals  Clinical Goals: Safety  Patient Goals: Sleep well    Progress made toward(s) clinical / shift goals:  progressing          "

## 2023-07-11 NOTE — PROGRESS NOTES
Hospital Medicine Daily Progress Note      Chief Complaint  Hypertension  Diabetes    Interval Problem Update  Discussed about her diabetes and will try to get her back on oral meds.    Review of Systems  Review of Systems   Constitutional:  Negative for chills and fever.   Respiratory:  Negative for shortness of breath.    Cardiovascular:  Negative for chest pain.   Gastrointestinal:  Negative for abdominal pain, diarrhea, nausea and vomiting.   Psychiatric/Behavioral:  The patient is not nervous/anxious.         Physical Exam  Temp:  [36.8 °C (98.2 °F)-37 °C (98.6 °F)] 36.8 °C (98.2 °F)  Pulse:  [73-94] 73  Resp:  [16-20] 20  BP: (110-155)/(62-84) 119/65  SpO2:  [91 %-95 %] 95 %    Physical Exam  Vitals and nursing note reviewed.   Constitutional:       Appearance: Normal appearance.   HENT:      Head: Atraumatic.   Eyes:      Conjunctiva/sclera: Conjunctivae normal.      Pupils: Pupils are equal, round, and reactive to light.   Cardiovascular:      Rate and Rhythm: Normal rate and regular rhythm.      Heart sounds: No murmur heard.  Pulmonary:      Effort: Pulmonary effort is normal.      Breath sounds: No stridor. No wheezing or rales.   Abdominal:      General: There is no distension.      Palpations: Abdomen is soft.      Tenderness: There is no abdominal tenderness.   Musculoskeletal:      Cervical back: Normal range of motion and neck supple.      Right lower leg: No edema.      Left lower leg: No edema.   Skin:     General: Skin is warm and dry.      Findings: No rash.   Neurological:      Mental Status: She is alert and oriented to person, place, and time.   Psychiatric:         Mood and Affect: Mood normal.         Behavior: Behavior normal.         Fluids    Intake/Output Summary (Last 24 hours) at 7/11/2023 0758  Last data filed at 7/11/2023 0531  Gross per 24 hour   Intake 480 ml   Output --   Net 480 ml         Laboratory                            Assessment/Plan  * Ischemic stroke (HCC)- (present on  admission)  Assessment & Plan  Cont ASA, Plavix  Cont Lipitor  Has ZioPatch    Hypertension  Assessment & Plan  BP ok  Cont Lisinopril  Cont to monitor    Type 2 diabetes mellitus without complication, without long-term current use of insulin (HCC)- (present on admission)  Assessment & Plan  Hba1c: 12.6 (7/2)  BS elevated: 150-186  Cont Metformin -- will increase dose  Cont Glargine  Note: home meds include Metformin & Glipizide 2.5 mg qalupe           has not taken her meds for > 8 years because 2nd to not getting regular medical care  Will try to get back on oral meds before discharge  Cont to monitor    Tobacco abuse- (present on admission)  Assessment & Plan  On Dulera  RT protocol

## 2023-07-11 NOTE — FLOWSHEET NOTE
07/11/23 1008   Events/Summary/Plan   Events/Summary/Plan spO2 check, MDI   Vital Signs   Pulse 88   Respiration 16   Pulse Oximetry 93 %   Respiratory Assessment   Respiratory Pattern Within Normal Limits   Level of Consciousness Alert   Breath Sounds   RUL Breath Sounds Clear   RML Breath Sounds Clear   RLL Breath Sounds Clear   OPAL Breath Sounds Clear   LLL Breath Sounds Crackles   Secretions   Cough Non Productive   Oxygen   O2 Delivery Device None - Room Air

## 2023-07-11 NOTE — CARE PLAN
The patient is Watcher - Medium risk of patient condition declining or worsening    Shift Goals  Clinical Goals: safety    Problem: Bladder / Voiding  Goal: Patient will establish and maintain regular urinary output  Outcome: Progressing     Problem: Pain - Standard  Goal: Alleviation of pain or a reduction in pain to the patient’s comfort goal  Note: Patient has no complaints of pain, is able to verbalize pain level and verbalize an acceptable level of pain.

## 2023-07-11 NOTE — PROGRESS NOTES
NURSING DAILY NOTE    Name: Nyasia Schuster   Date of Admission: 7/5/2023   Admitting Diagnosis: Ischemic stroke (HCC)  Attending Physician: Unique Black M.d.  Allergies: Patient has no known allergies.    Safety  Patient Assist  Min Assist  Patient Precautions  Fall Risk  Precaution Comments  zio patch (currently no working)  Bed Transfer Status  Contact Guard Assist  Toilet Transfer Status   Contact Guard Assist  Assistive Devices  Rails, Wheelchair  Oxygen  Room air w/o2 available  Diet/Therapeutic Dining  Current Diet Order   Procedures    Diet Order Diet: Level 7 - Easy to Chew (meds whole with thins, Please add a T- rocker knife to all plates.); Liquid level: Level 0 - Thin; Second Modifier: (optional): Consistent CHO (Diabetic)     Pill Administration  whole  Agitated Behavioral Scale  14  ABS Level of Severity  No Agitation    Fall Risk  Has the patient had a fall this admission?   No  Deanne Cheng Fall Risk Scoring  11, MODERATE RISK  Fall Risk Safety Measures  chair alarm    Vitals  Temperature: 37 °C (98.6 °F)  Temp src: Oral  Pulse: 76  Respiration: 18  Blood Pressure : 122/62  Blood Pressure MAP (Calculated): 82 MM HG  BP Location: Left, Upper Arm  Patient BP Position: Supine     Oxygen  Pulse Oximetry: 91 %  O2 (LPM): 0  O2 Delivery Device: Room air w/o2 available  Incentive Spirometer Volume: 1000 mL    Bowel and Bladder  Last Bowel Movement  07/10/23  Stool Type  Type 7: Watery, no solid pieces-entirely liquid, Type 6: Fluffy pieces with ragged edges, a mushy stool  Bowel Device  Bathroom, Other (Comment) (Bowel Meds)  Continent  Bladder: Continent void   Bowel: Continent movement  Bladder Function  Urine Void (mL):  (Moderate)  Number of Times Voided: 1  Urinary Options: Yes  Urine Color: Yellow  Number of Times Incontinent of Urine: 0  Genitourinary Assessment   Bladder Assessment (WDL):  WDL Except  Bhatt Catheter: Not  Applicable  Urine Color: Yellow  Number of Bladder Accidents: 0  Total Number of Bladder of Accidents in Last 7 Days: 0  Number of Times Incontinent of Urine: 0  Bladder Device: Bathroom  Bladder Scan: Post Void  $ Bladder Scan Results (mL): 82    Skin  Parveen Score   18  Sensory Interventions   Bed Types: Standard Mattress  Skin Preventative Measures: Pillows in Use for Support / Positioning  Moisture Interventions         Pain  Pain Rating Scale  0 - No Pain  Pain Location  Generalized  Pain Location Orientation     Pain Interventions   Declines    ADLs    Bathing   Shower, Staff (OT)  Linen Change      Personal Hygiene  Perineal Care (HANDWASHED)  Chlorhexidine Bath      Oral Care  Brushed Teeth  Teeth/Dentures     Shave     Nutrition Percentage Eaten  *  * Meal *  *, Lunch, Between 50-75% Consumed (60%)  Environmental Precautions  Treaded Slipper Socks on Patient, Bed in Low Position  Patient Turns/Positioning  Patient Turns Self from Side to Side  Patient Turns Assistance/Tolerance  Assistance of One, General Weakness  Bed Positions  Bed Controls On, Bed Locked  Head of Bed Elevated  Self regulated      Psychosocial/Neurologic Assessment  Psychosocial Assessment  Psychosocial (WDL):  Within Defined Limits  Neurologic Assessment  Neuro (WDL): Exceptions to WDL  Level of Consciousness: Alert  Orientation Level: Oriented X4  Cognition: Appropriate judgement, Appropriate safety awareness, Appropriate attention/concentration, Appropriate for developmental age  Speech: Clear  Pupil Assesment: Yes  R Pupil Size (mm): 3  R Pupil Shape / Description: Round  R Pupil Reaction: Brisk  L Pupil Size (mm): 3  L Pupil Shape / Description: Round  L Pupil Reaction: Brisk  Motor Function/Sensation Assessment: Sensation  RUE Sensation: Full sensation  Muscle Strength Right Arm: Fair Strength against Gravity but No Resistance  LUE Sensation: Full sensation  RLE Sensation: Full sensation  Muscle Strength Right Leg: Fair Strength  against Gravity but No Resistance  LLE Sensation: Full sensation  EENT (WDL):  WDL Except    Cardio/Pulmonary Assessment  Edema      Respiratory Breath Sounds  RUL Breath Sounds: Clear, Diminished  RML Breath Sounds: Clear, Diminished  RLL Breath Sounds: Clear, Diminished  OPAL Breath Sounds: Clear, Diminished  LLL Breath Sounds: Clear, Diminished  Cardiac Assessment   Cardiac (WDL):  WDL Except

## 2023-07-11 NOTE — THERAPY
Occupational Therapy  Daily Treatment     Patient Name: Nyasia Schuster  Age:  67 y.o., Sex:  female  Medical Record #: [id number]  Today's Date: 7/11/2023     Precautions  Precautions: Fall Risk  Comments: zio patch (currently no working)         Subjective    Pt seated in w/c upon arrival, pleasant and cooperative, agreeable to therapy       Objective       07/11/23 0831   OT Charge Group   OT Neuromuscular Re-education / Balance (Units) 4   OT Therapeutic Exercise (Units) 2   OT Total Time Spent   OT Individual Total Time Spent (Mins) 90   Functional Level of Assist   Grooming Standby Assist;Seated  (wash hands)   Toileting Standby Assist   Toilet Transfers Standby Assist  (stand pivot w/c<>toilet with GB)   Interdisciplinary Plan of Care Collaboration   Patient Position at End of Therapy Seated;Call Light within Reach;Tray Table within Reach;Self Releasing Lap Belt Applied;Chair Alarm On       RUE Neuro-Muscular Treatments:  - RUE neuro re-ed using  FES cycling with RUE's. Electrodes placed at RUE shoulder, scapular stabilizers, biceps/triceps, forearm extensors/flexors. Distance travelled: 4.41 mile, energy expended: 0.6 kcal, energy per hour: 1.3 kcal/hour, avg power: 1.5 w, avg asymmetry R 2%, total therapy time: 30:07 min including warm-up/cool-down, time active (off motor): 16:24 min, time passive (on motor) 13:43 min.    - seated table slides: RUE shoulder flexion/extension 3 sets x 10 with 3-4lbs dumbbells  - R shoulder complex AAROM - gravity eliminated: shoulder flexion/extension, abd/adduction, IR/ER, scapular retraction  - R pectoral passive stretch    Assessment    Pt completed  FES cycling for RUE neuro re-edu, ROM, sensory input. Pt tolerated session well, good muscle contraction observed, and no complaints of pain.  Session also focused on postural strengthening d/t report of R hand numbness when ambulating - pt reports at baseline that she always had bad posture and arm will sometimes  fall asleep. At baseline - walking she tends to look down and sleeping she sleeps on her side and on her arm. She already has attempted to change sleeping position - further edu provided on using pillows to assist with positioning which she was receptive to. Otherwise edu on back postural muscle strengthening and stretching was reviewed. Arm sling also provided to assist with this issue temporarily until shoulder complex becomes stronger.     Strengths: Able to follow instructions, Alert and oriented, Effective communication skills, Good carryover of learning, Independent prior level of function, Good insight into deficits/needs, Making steady progress towards goals, Motivated for self care and independence, Pleasant and cooperative, Supportive family, Willingly participates in therapeutic activities  Barriers: Bowel incontinence, Decreased endurance, Fatigue, Generalized weakness, Hemiplegia, Home accessibility, Impaired activity tolerance, Impaired balance, Limited mobility    Plan    RUE neuro re-ed, postural strengthening, R pectoral stretch, standing balance, ADL/IADL's at SPC level     Occupational Therapy Goals (Active)       Problem: Bathing       Dates: Start:  07/06/23         Goal: STG-Within one week, patient will bathe at SBA using DME/AE PRN.       Dates: Start:  07/06/23         Goal Note filed on 07/10/23 1142 by Yajaira Pickard, MS,OTR/L       Requires CGA                 Problem: Eating       Dates: Start:  07/06/23         Goal: STG-Within one week, patient will feed self at Mod I level using AE PRN.       Dates: Start:  07/06/23         Goal Note filed on 07/10/23 1142 by Yajaira Pickard, MS,OTR/L       Requires set-up assist                 Problem: Functional Transfers       Dates: Start:  07/06/23         Goal: STG-Within one week, patient will transfer to toilet at SBA level using DME PRN.       Dates: Start:  07/06/23         Goal Note filed on 07/10/23 1142 by Yajaira Pickard,  MS,OTR/L       Requires CGA with use quad cane                 Problem: OT Long Term Goals       Dates: Start:  07/06/23         Goal: LTG-By discharge, patient will complete basic self care tasks at SBA-Mod I level using DME/AE PRN.       Dates: Start:  07/06/23            Goal: LTG-By discharge, patient will perform bathroom transfers at SPV-Mod I level using DME/AE PRN.       Dates: Start:  07/06/23            Goal: LTG-By discharge, patient will complete basic home management at Min A-Mod I level using DME/AE PRN.       Dates: Start:  07/06/23               Problem: Toileting       Dates: Start:  07/06/23         Goal: STG-Within one week, patient will complete toileting tasks at CGA level using DME PRN.       Dates: Start:  07/06/23         Goal Note filed on 07/10/23 1142 by Yajaira Pickard, MS,OTR/L       Requires Min A

## 2023-07-11 NOTE — FLOWSHEET NOTE
07/11/23 1008   Inhalation Therapy Treatment   $ MDI/DPI Given MDI/DPI x 1   Incentive Spirometry Treatment   Height 1.524 m (5')   Predicted Inspiratory Capacity 1750   60% of predicted IS capacity 1050 mL   40% of predicted IS capacity 700 mL   Incentive Spirometer Volume 1500 mL

## 2023-07-11 NOTE — PROGRESS NOTES
Rehab Progress Note     Date of Service: 7/11/2023  Chief Complaint: Follow-up stroke    Interval Events (Subjective)    Patient seen and examined today in her room.  She continues to report improvement in her right arm.  She has been using melatonin for some difficulty falling asleep.  Her Zio patch is blanking.  Company was contacted.  It is recording but not transmitting current data.  We will keep it on to complete 2 weeks.  We discussed her discharge date which has been set for the 17th.  She last moved her bowels yesterday.    Patient has no other new questions, concerns, or complaints today.       Objective:  VITAL SIGNS: /65   Pulse 73   Temp 36.8 °C (98.2 °F) (Oral)   Resp 20   Ht 1.524 m (5')   Wt 79.8 kg (176 lb)   SpO2 95%   BMI 34.37 kg/m²   Gen: alert, no apparent distress  CV: Regular rate, regular rhythm, cardiac monitor left upper chest wall  Resp: Clear to auscultation bilaterally  Neuro: Improving right arm hemiparesis    Recent Results (from the past 72 hour(s))   POCT glucose device results    Collection Time: 07/08/23 11:04 AM   Result Value Ref Range    POC Glucose, Blood 230 (H) 65 - 99 mg/dL   POCT glucose device results    Collection Time: 07/08/23  4:53 PM   Result Value Ref Range    POC Glucose, Blood 117 (H) 65 - 99 mg/dL   POCT glucose device results    Collection Time: 07/08/23  9:12 PM   Result Value Ref Range    POC Glucose, Blood 184 (H) 65 - 99 mg/dL   POCT glucose device results    Collection Time: 07/09/23  7:51 AM   Result Value Ref Range    POC Glucose, Blood 154 (H) 65 - 99 mg/dL   POCT glucose device results    Collection Time: 07/09/23 11:18 AM   Result Value Ref Range    POC Glucose, Blood 151 (H) 65 - 99 mg/dL   POCT glucose device results    Collection Time: 07/09/23  5:13 PM   Result Value Ref Range    POC Glucose, Blood 186 (H) 65 - 99 mg/dL   POCT glucose device results    Collection Time: 07/09/23 10:12 PM   Result Value Ref Range    POC Glucose, Blood  174 (H) 65 - 99 mg/dL   POCT glucose device results    Collection Time: 07/10/23  7:08 AM   Result Value Ref Range    POC Glucose, Blood 168 (H) 65 - 99 mg/dL   POCT glucose device results    Collection Time: 07/10/23 11:06 AM   Result Value Ref Range    POC Glucose, Blood 176 (H) 65 - 99 mg/dL   POCT glucose device results    Collection Time: 07/10/23  5:06 PM   Result Value Ref Range    POC Glucose, Blood 150 (H) 65 - 99 mg/dL   POCT glucose device results    Collection Time: 07/10/23  8:39 PM   Result Value Ref Range    POC Glucose, Blood 170 (H) 65 - 99 mg/dL   POCT glucose device results    Collection Time: 07/11/23  7:31 AM   Result Value Ref Range    POC Glucose, Blood 164 (H) 65 - 99 mg/dL       Scheduled Medications   Medication Dose Frequency    metFORMIN  850 mg BID WITH MEALS    lisinopril  5 mg Q DAY    insulin lispro  2-12 Units 4X/DAY ACHS    insulin GLARGINE  18 Units QAM INSULIN    aspirin  81 mg DAILY    atorvastatin  40 mg Q EVENING    clopidogrel  75 mg DAILY    enoxaparin (LOVENOX) injection  40 mg DAILY AT 1800    lidocaine  2 Patch DAILY    nicotine  21 mg Daily-0600    senna-docusate  2 Tablet BID    nystatin  5 mL 4X/DAY    mometasone-formoterol  2 Puff BID (RT)    simethicone  125 mg TID WITH MEALS    loratadine  10 mg DAILY       Current Diet Order   Procedures    Diet Order Diet: Level 7 - Easy to Chew (meds whole with thins, Please add a T- rocker knife to all plates.); Liquid level: Level 0 - Thin; Second Modifier: (optional): Consistent CHO (Diabetic)       Assessment:    This patient is a 67 y.o. female admitted for acute inpatient rehabilitation with Ischemic stroke (HCC).      I, Unique Black M.D./MD., was present and led the interdisciplinary team conference on 7/10/2023.  I led the IDT conference and agree with the IDT conference documentation and plan of care as noted below.     Nursing:  Diet Current Diet Order   Procedures    Diet Order Diet: Level 7 - Easy to Chew  (meds whole with thins, Please add a T- rocker knife to all plates.); Liquid level: Level 0 - Thin; Second Modifier: (optional): Consistent CHO (Diabetic)       Eating ADL Supervision  Set-up of equipment or meal/tube feeding (See note for self-feeding eval details.)   % of Last Meal  Oral Nutrition: *  * Meal *  *, Dinner, Between % Consumed   Sleep No issues   Bowel Last BM: 07/09/23   Bladder Post Void  82   Barriers to Discharge Home: diabetic education      Physical Therapy:  Bed Mobility    Transfers Contact Guard Assist  Supervision for safety, Adaptive equipment (Stand pivot using quad cane from bed to w/c.)   Mobility Contact Guard Assist   Stairs Minimal Assist (CG/Mc)   Barriers to Discharge Home: stairs at home      Occupational Therapy:  Grooming Standby Assist (Seated in w/c)   Bathing Contact Guard Assist   UB Dressing Stand by Assist   LB Dressing Contact Guard Assist   Toileting Moderate Assist   Shower & Tub Transfer Contact Guard Assist   Barriers to Discharge Home: right arm weakness    Respiratory Therapy:  O2 (LPM): 0  O2 Delivery Device: None - Room Air    Case Management:  Continues to work on disposition and DME needs.      Discharge Date/Disposition:    7/17    HH: PT/OT/RN    Equip: shower bench, small based quad cane    Follow-ups: PCP, stroke bridge clinic, Dr. Haiens      Problem List/Medical Decision Making and Plan:    Left MCA stroke  Right hemiparesis  PT/OT, 1.5 hr each discipline, 5 days per week    Continue aspirin and Plavix for 10 days, then Plavix alone    Continue atorvastatin    Patient has cardiac monitor in place, to be returned 7/19    Outpatient follow-up with stroke [facility] and Dr. Haines, referrals made    Diabetes with hyperglycemia  Metformin increased  Continue glargine and sliding scale insulin  Goal to get off insulin prior to discharge  Appreciate hospitalist assistance  Needs glucometer at discharge     Tobacco abuse  Continue nicotine patch and  gum  Will need counseling    Hypertension  Continue lisinopril    Upper back pain  Continue Lidoderm patches    Oral thrush, resolved  Completed nystatin     Seasonal allergies  Continue Claritin     Respiratory failure with hypoxia  Likely due to undiagnosed COPD  Continue Dulera  Titrated off oxygen     Abdominal gas, resolved  Completed simethicone    Constipation, resolved  Continue Senokot  Last bowel movement 7/10    DVT prophylaxis  Continue Lovenox    Unique Black M.D.  Physical Medicine and Rehabilitation

## 2023-07-11 NOTE — THERAPY
Physical Therapy   Daily Treatment     Patient Name: Nyasia Schuster  Age:  67 y.o., Sex:  female  Medical Record #: [id number]  Today's Date: 7/11/2023     Precautions  Precautions: (P) Fall Risk  Comments: (P) Zio patch    Subjective    Pt reported motivation, and acknowledged progress to date.      Objective       07/11/23 1301   PT Charge Group   PT Gait Training (Units) 1   PT Neuromuscular Re-Education / Balance (Units) 2   PT Therapeutic Activities (Units) 1   PT Total Time Spent   PT Individual Total Time Spent (Mins) 60   Precautions   Precautions Fall Risk   Comments zio patch   Gait Functional Level of Assist    Gait Level Of Assist Contact Guard Assist   Assistive Device Wheelchair Push   Distance (Feet) 290   # of Times Distance was Traveled 1   Deviation Decreased Base Of Support;Bradykinetic;Decreased Heel Strike;Decreased Toe Off  (Dec RLE clearance as fatigue set in, emphasis on step through gait pattern, cues to look foward as able.)   Transfer Functional Level of Assist   Bed, Chair, Wheelchair Transfer Contact Guard Assist   Bed Chair Wheelchair Transfer Description Adaptive equipment  (SPT wc push upon return to room, RLE fatigue evident)   Bed Mobility    Sit to Supine Supervised   Sit to Stand Supervised   Scooting Supervised   Rolling Supervised   Neuro-Muscular Treatments   Neuro-Muscular Treatments Weight Shift Left;Weight Shift Right;Verbal Cuing;Sequencing   Outcome Measures   Outcome Measures Utilized Delgadillo Balance Scale   Del[place]illo Balance Scale   Sitting Unsupported (Score 0-4) 4   Change Of Positon: Sitting To Standing (Score 0-4) 4   Change Of Positon: Standing To Sitting (Score 0-4) 4   Transfers (Score 0-4) 4   Standing Unsupported (Score 0-4) 3   Standing With Eyes Closed (Score 0-4) 3   Standing With Feet Together (Score 0-4) 3   Tandem Standing (Score 0-4) 2   Standing On One Leg (Score 0-4) 4   Turning Trunk (Feet Fixed) (Score 0-4) 2   Retrieving Objects From Floor (Score 0-4) 3    Turning 360 Degrees (Score 0-4) 1   Stool Stepping (Score 0-4) 2   Reaching Forward While Standing (Score 0-4) 3   Vega Balance Total Score (0-56) 42   Interdisciplinary Plan of Care Collaboration   IDT Collaboration with  Physical Therapist Assistant (PTA)   Patient Position at End of Therapy In Bed;Bed Alarm On;Tray Table within Reach;Call Light within Reach;Phone within Reach   Collaboration Comments POC         Assessment    Pt completed VEGA assessment, scoring 42/56 = ongoing risk for falls, and recommendation for use of AD. Pt participated in wc push amb back to room, to encourage symmetry, forced use of RUE, and step through gait patterning. Pt was limited by dec foot clearance with inc fatigue/ distance (final 1/4 of distance).       Strengths: Able to follow instructions, Alert and oriented, Good insight into deficits/needs, Independent prior level of function, Manages pain appropriately, Motivated for self care and independence, Pleasant and cooperative, Supportive family, Willingly participates in therapeutic activities  Barriers: Decreased endurance, Fatigue, Hemiparesis, Home accessibility, Impaired activity tolerance, Impaired balance, Limited mobility    Plan    Ambulation w/ SBQC  Activity tolerance  Standing balance  Stairs training w/ LHR  Trial LiteGait for step through gait training  RUE forced use and WB/ reassess FWW        Passport items to be completed:  Get in/out of bed safely, in/out of a vehicle, safely use mobility device, walk or wheel around home/community, navigate up and down stairs, show how to get up/down from the ground, ensure home is accessible, demonstrate HEP, complete caregiver training    Physical Therapy Problems (Active)       Problem: Balance       Dates: Start:  07/06/23         Goal: STG-Within one week, patient will tolerate Vega Balance Scale assessment.       Dates: Start:  07/06/23               Problem: Mobility       Dates: Start:  07/06/23         Goal:  STG-Within one week, patient will ambulate up/down flight of stairs with LUE support and SBA-CGA.       Dates: Start:  07/06/23               Problem: Mobility Transfers       Dates: Start:  07/06/23         Goal: STG-Within one week, patient will perform bed mobility independently.       Dates: Start:  07/06/23               Problem: PT-Long Term Goals       Dates: Start:  07/06/23         Goal: LTG-By discharge, patient will ambulate household distances mod I with LRAD, community distances supervised with LRAD.       Dates: Start:  07/06/23            Goal: LTG-By discharge, patient will transfer one surface to another with LRAD mod I.       Dates: Start:  07/06/23            Goal: LTG-By discharge, patient will ambulate up/down flight of stairs with LUE support and supervision.       Dates: Start:  07/06/23            Goal: LTG-By discharge, patient will transfer in/out of a car with LRAD and supervision.       Dates: Start:  07/06/23

## 2023-07-11 NOTE — THERAPY
"Physical Therapy   Daily Treatment     Patient Name: Nyasia Schuster  Age:  67 y.o., Sex:  female  Medical Record #: [id number]  Today's Date: 7/11/2023     Precautions  Precautions: Fall Risk  Comments: zio patch (currently no working)    Subjective    Received pt from OT session, agreeable to session.     Objective       07/11/23 1101   PT Charge Group   PT Therapeutic Activities (Units) 2   Supervising Physical Therapist Ailyn Barros   PT Total Time Spent   PT Individual Total Time Spent (Mins) 30   Cognition    Level of Consciousness Alert   Stairs Functional Level of Assist   Level of Assist with Stairs Contact Guard Assist   # of Stairs Climbed   (4\" x6x2; 6\" x4x2 w/ LHR, step-to)   Stairs Description Extra time;Hand rails;Supervision for safety;Verbal cueing;Assist device/equipment  (R standard sling for comfort)   Transfer Functional Level of Assist   Bed, Chair, Wheelchair Transfer Contact Guard Assist   Bed Chair Wheelchair Transfer Description Initial preparation for task;Supervision for safety;Set-up of equipment  (SPT)   Bed Mobility    Sit to Stand Supervised   Interdisciplinary Plan of Care Collaboration   Patient Position at End of Therapy Seated  (in cafeteria)     Assisted donning/ doffing standard sling for comfort/support.    Assessment    Pt tolerated session well, continues to be motivated to participate. Will have to schedule FT w/ pt's sister before d/c per pt's preference. R standard sling during mobility per pt's preference.    Strengths: Able to follow instructions, Alert and oriented, Good insight into deficits/needs, Independent prior level of function, Manages pain appropriately, Motivated for self care and independence, Pleasant and cooperative, Supportive family, Willingly participates in therapeutic activities  Barriers: Decreased endurance, Fatigue, Hemiparesis, Home accessibility, Impaired activity tolerance, Impaired balance, Limited mobility    Plan    Ambulation w/ SBQC  Activity " tolerance  Standing balance  Stairs training w/ LHR     Passport items to be completed:  Get in/out of bed safely, in/out of a vehicle, safely use mobility device, walk or wheel around home/community, navigate up and down stairs, show how to get up/down from the ground, ensure home is accessible, demonstrate HEP, complete caregiver training    Physical Therapy Problems (Active)       Problem: Balance       Dates: Start:  07/06/23         Goal: STG-Within one week, patient will tolerate Delgadillo Balance Scale assessment.       Dates: Start:  07/06/23               Problem: Mobility       Dates: Start:  07/06/23         Goal: STG-Within one week, patient will ambulate up/down flight of stairs with LUE support and SBA-CGA.       Dates: Start:  07/06/23               Problem: Mobility Transfers       Dates: Start:  07/06/23         Goal: STG-Within one week, patient will perform bed mobility independently.       Dates: Start:  07/06/23               Problem: PT-Long Term Goals       Dates: Start:  07/06/23         Goal: LTG-By discharge, patient will ambulate household distances mod I with LRAD, community distances supervised with LRAD.       Dates: Start:  07/06/23            Goal: LTG-By discharge, patient will transfer one surface to another with LRAD mod I.       Dates: Start:  07/06/23            Goal: LTG-By discharge, patient will ambulate up/down flight of stairs with LUE support and supervision.       Dates: Start:  07/06/23            Goal: LTG-By discharge, patient will transfer in/out of a car with LRAD and supervision.       Dates: Start:  07/06/23

## 2023-07-12 ENCOUNTER — APPOINTMENT (OUTPATIENT)
Dept: OCCUPATIONAL THERAPY | Facility: REHABILITATION | Age: 68
DRG: 056 | End: 2023-07-12
Attending: PHYSICAL MEDICINE & REHABILITATION
Payer: MEDICARE

## 2023-07-12 ENCOUNTER — APPOINTMENT (OUTPATIENT)
Dept: PHYSICAL THERAPY | Facility: REHABILITATION | Age: 68
DRG: 056 | End: 2023-07-12
Attending: PHYSICAL MEDICINE & REHABILITATION
Payer: MEDICARE

## 2023-07-12 LAB
GLUCOSE BLD STRIP.AUTO-MCNC: 101 MG/DL (ref 65–99)
GLUCOSE BLD STRIP.AUTO-MCNC: 108 MG/DL (ref 65–99)
GLUCOSE BLD STRIP.AUTO-MCNC: 140 MG/DL (ref 65–99)
GLUCOSE BLD STRIP.AUTO-MCNC: 143 MG/DL (ref 65–99)

## 2023-07-12 PROCEDURE — A9270 NON-COVERED ITEM OR SERVICE: HCPCS | Performed by: PHYSICAL MEDICINE & REHABILITATION

## 2023-07-12 PROCEDURE — 700102 HCHG RX REV CODE 250 W/ 637 OVERRIDE(OP): Performed by: HOSPITALIST

## 2023-07-12 PROCEDURE — 700102 HCHG RX REV CODE 250 W/ 637 OVERRIDE(OP): Performed by: PHYSICAL MEDICINE & REHABILITATION

## 2023-07-12 PROCEDURE — A9270 NON-COVERED ITEM OR SERVICE: HCPCS | Performed by: HOSPITALIST

## 2023-07-12 PROCEDURE — 94760 N-INVAS EAR/PLS OXIMETRY 1: CPT

## 2023-07-12 PROCEDURE — 97530 THERAPEUTIC ACTIVITIES: CPT

## 2023-07-12 PROCEDURE — 99232 SBSQ HOSP IP/OBS MODERATE 35: CPT | Performed by: HOSPITALIST

## 2023-07-12 PROCEDURE — 97110 THERAPEUTIC EXERCISES: CPT

## 2023-07-12 PROCEDURE — 94640 AIRWAY INHALATION TREATMENT: CPT

## 2023-07-12 PROCEDURE — 97116 GAIT TRAINING THERAPY: CPT

## 2023-07-12 PROCEDURE — 97112 NEUROMUSCULAR REEDUCATION: CPT

## 2023-07-12 PROCEDURE — 82962 GLUCOSE BLOOD TEST: CPT | Mod: 91

## 2023-07-12 PROCEDURE — 770010 HCHG ROOM/CARE - REHAB SEMI PRIVAT*

## 2023-07-12 PROCEDURE — 700111 HCHG RX REV CODE 636 W/ 250 OVERRIDE (IP): Mod: JZ | Performed by: PHYSICAL MEDICINE & REHABILITATION

## 2023-07-12 PROCEDURE — 99232 SBSQ HOSP IP/OBS MODERATE 35: CPT | Performed by: PHYSICAL MEDICINE & REHABILITATION

## 2023-07-12 RX ORDER — SIMETHICONE 125 MG
125 TABLET,CHEWABLE ORAL 4 TIMES DAILY PRN
Status: DISCONTINUED | OUTPATIENT
Start: 2023-07-12 | End: 2023-07-17 | Stop reason: HOSPADM

## 2023-07-12 RX ADMIN — ENOXAPARIN SODIUM 40 MG: 100 INJECTION SUBCUTANEOUS at 17:10

## 2023-07-12 RX ADMIN — METFORMIN HYDROCHLORIDE 850 MG: 850 TABLET ORAL at 17:11

## 2023-07-12 RX ADMIN — MOMETASONE FUROATE AND FORMOTEROL FUMARATE DIHYDRATE 2 PUFF: 200; 5 AEROSOL RESPIRATORY (INHALATION) at 20:10

## 2023-07-12 RX ADMIN — NYSTATIN 500000 UNITS: 100000 SUSPENSION ORAL at 13:00

## 2023-07-12 RX ADMIN — LORATADINE 10 MG: 10 TABLET ORAL at 08:18

## 2023-07-12 RX ADMIN — NICOTINE TRANSDERMAL SYSTEM 21 MG: 21 PATCH, EXTENDED RELEASE TRANSDERMAL at 06:04

## 2023-07-12 RX ADMIN — ATORVASTATIN CALCIUM 40 MG: 40 TABLET, FILM COATED ORAL at 20:10

## 2023-07-12 RX ADMIN — CLOPIDOGREL BISULFATE 75 MG: 75 TABLET ORAL at 08:18

## 2023-07-12 RX ADMIN — MOMETASONE FUROATE AND FORMOTEROL FUMARATE DIHYDRATE 2 PUFF: 200; 5 AEROSOL RESPIRATORY (INHALATION) at 06:11

## 2023-07-12 RX ADMIN — LISINOPRIL 5 MG: 5 TABLET ORAL at 06:04

## 2023-07-12 RX ADMIN — NYSTATIN 500000 UNITS: 100000 SUSPENSION ORAL at 07:37

## 2023-07-12 RX ADMIN — ASPIRIN 81 MG CHEWABLE TABLET 81 MG: 81 TABLET CHEWABLE at 08:18

## 2023-07-12 RX ADMIN — METFORMIN HYDROCHLORIDE 850 MG: 850 TABLET ORAL at 08:18

## 2023-07-12 ASSESSMENT — ENCOUNTER SYMPTOMS
VOMITING: 0
BLURRED VISION: 0
NAUSEA: 0
FEVER: 0
SHORTNESS OF BREATH: 0
HALLUCINATIONS: 0
DIZZINESS: 0
HEADACHES: 0
PALPITATIONS: 0

## 2023-07-12 ASSESSMENT — PATIENT HEALTH QUESTIONNAIRE - PHQ9
SUM OF ALL RESPONSES TO PHQ9 QUESTIONS 1 AND 2: 0
1. LITTLE INTEREST OR PLEASURE IN DOING THINGS: NOT AT ALL
2. FEELING DOWN, DEPRESSED, IRRITABLE, OR HOPELESS: NOT AT ALL

## 2023-07-12 ASSESSMENT — PAIN DESCRIPTION - PAIN TYPE: TYPE: ACUTE PAIN

## 2023-07-12 ASSESSMENT — GAIT ASSESSMENTS
ASSISTIVE DEVICE: QUAD CANE;OTHER (COMMENTS)
GAIT LEVEL OF ASSIST: CONTACT GUARD ASSIST
DEVIATION: DECREASED BASE OF SUPPORT;BRADYKINETIC;DECREASED HEEL STRIKE;DECREASED TOE OFF
DISTANCE (FEET): 130

## 2023-07-12 ASSESSMENT — ACTIVITIES OF DAILY LIVING (ADL)
BED_CHAIR_WHEELCHAIR_TRANSFER_DESCRIPTION: ADAPTIVE EQUIPMENT;INCREASED TIME;SET-UP OF EQUIPMENT;SUPERVISION FOR SAFETY;VERBAL CUEING
BED_CHAIR_WHEELCHAIR_TRANSFER_DESCRIPTION: ADAPTIVE EQUIPMENT;INCREASED TIME;SET-UP OF EQUIPMENT;SUPERVISION FOR SAFETY;VERBAL CUEING

## 2023-07-12 NOTE — THERAPY
"Occupational Therapy  Daily Treatment     Patient Name: Nyasia Schuster  Age:  67 y.o., Sex:  female  Medical Record #: [id number]  Today's Date: 7/12/2023     Precautions  Precautions: (P) Fall Risk  Comments: (P) zio patch, RUE sling to prevent subluxation         Subjective    Pt seated in w/c upon arrival. Pt pleasant and cooperative, agreeable to therapy. \"My arm is tired today from the treadmill.\"     Objective       07/12/23 1431   OT Total Time Spent   OT Individual Total Time Spent (Mins) 60   Precautions   Precautions Fall Risk   Comments zio patch, RUE sling to prevent subluxation   Vitals   O2 Delivery Device None - Room Air   Pain 0 - 10 Group   Location Shoulder   Location Orientation Right;Posterior   Description Sharp   Comfort Goal Comfort with Movement   Therapist Pain Assessment Prior to Activity  (Pain with scapular retraction)   Cognition    Level of Consciousness Alert   Sleep/Wake Cycle   Sleep & Rest Awake;Out of bed   Sitting Upper Body Exercises   Sitting Upper Body Exercises Yes   Bicep Curls 2 sets of 10;Right ;Other Resistance (See Comments)  (With pink theraband)   Elbow Extension 2 sets of 10;Right ;Other Resistance (See Comments)  (with pink theraband)   Pronation / Supination 2 sets of 10;Right ;Weight (See Comments for lbs)  (with 2 lbs dumbell. pt rested arm on table while completing exercise)   Other Exercise Table top shoulder flexion with towel. 1x10 with 3 lbs dumbell, 1x10 with 4 lbs, and 1x10 with 7lbs.   Fine Motor / Dexterity    Fine Motor / Dexterity Yes   Fine Motor / Dexterity Interventions Pt picked up 5 large pegs with R UE. Pt placed one large peg back into board.   Neuro-Muscular Treatments   Neuro-Muscular Treatments Electrical Stimulation   Comments NMES at 25mA applied over wrist extensors for 5 min with good tolerance and muscle activation noted. Used as active assist for digit/wrist extension table slides.   Interdisciplinary Plan of Care Collaboration "   Patient Position at End of Therapy Seated;Chair Alarm On;Self Releasing Lap Belt Applied;Call Light within Reach;Tray Table within Reach;Phone within Reach       Assessment    Pt successfully grasped 5 large pegs from peg board onto table with fine pincer grasp and slid to lateral grasp using digits 1 and 2. Pt able to place one large peg back into board after 10+ attempts with increased time and effort using lateral pinch between digits 1 and 2. Pt compensating in trunk and shoulder trying to get large peg into board.  New emerging active movement in digits 3-5.    Strengths: Able to follow instructions, Alert and oriented, Effective communication skills, Good carryover of learning, Independent prior level of function, Good insight into deficits/needs, Making steady progress towards goals, Motivated for self care and independence, Pleasant and cooperative, Supportive family, Willingly participates in therapeutic activities  Barriers: Bowel incontinence, Decreased endurance, Fatigue, Generalized weakness, Hemiplegia, Home accessibility, Impaired activity tolerance, Impaired balance, Limited mobility    Plan    Continue R UE neuro re-ed. Focus on R UE digit and wrist strength and ROM.    Passport items to be completed:  Perform bathroom transfers, complete dressing, complete feeding, get ready for the day, prepare a simple meal, participate in household tasks, adapt home for safety needs, demonstrate home exercise program, complete caregiver training     Occupational Therapy Goals (Active)       Problem: Bathing       Dates: Start:  07/06/23         Goal: STG-Within one week, patient will bathe at SBA using DME/AE PRN.       Dates: Start:  07/06/23         Goal Note filed on 07/10/23 1142 by Yajaira Pickard MS,OTR/L       Requires CGA                 Problem: Eating       Dates: Start:  07/06/23         Goal: STG-Within one week, patient will feed self at Mod I level using AE PRN.       Dates: Start:  07/06/23          Goal Note filed on 07/10/23 1142 by Yajaira Pickard MS,OTR/L       Requires set-up assist                 Problem: Functional Transfers       Dates: Start:  07/06/23         Goal: STG-Within one week, patient will transfer to toilet at SBA level using DME PRN.       Dates: Start:  07/06/23         Goal Note filed on 07/10/23 1142 by Yajaira Pickard MS,OTR/L       Requires CGA with use quad cane                 Problem: OT Long Term Goals       Dates: Start:  07/06/23         Goal: LTG-By discharge, patient will complete basic self care tasks at SBA-Mod I level using DME/AE PRN.       Dates: Start:  07/06/23            Goal: LTG-By discharge, patient will perform bathroom transfers at SPV-Mod I level using DME/AE PRN.       Dates: Start:  07/06/23            Goal: LTG-By discharge, patient will complete basic home management at Min A-Mod I level using DME/AE PRN.       Dates: Start:  07/06/23               Problem: Toileting       Dates: Start:  07/06/23         Goal: STG-Within one week, patient will complete toileting tasks at CGA level using DME PRN.       Dates: Start:  07/06/23         Goal Note filed on 07/10/23 1142 by Yajaira Pickard MS,OTR/L       Requires Min A

## 2023-07-12 NOTE — CARE PLAN
"The patient is Watcher - Medium risk of patient condition declining or worsening    Shift Goals  Clinical Goals: safety    Problem: Bladder / Voiding  Goal: Patient will establish and maintain regular urinary output  Outcome: Progressing     Problem: Fall Risk - Rehab  Goal: Patient will remain free from falls  Note: Deanne Cheng Fall risk Assessment Score: 12    Moderate fall risk Interventions  - Bed and strip alarm   - Yellow sign by the door   - Yellow wrist band \"Fall risk\"  - Room near to the nurse station  - Do not leave patient unattended in the bathroom  - Fall risk education provided     "

## 2023-07-12 NOTE — PROGRESS NOTES
Hospital Medicine Daily Progress Note      Chief Complaint  Hypertension  Diabetes    Interval Problem Update  Discussed about stopping her Glargine and increasing her oral meds today.    Review of Systems  Review of Systems   Constitutional:  Negative for fever.   Eyes:  Negative for blurred vision.   Respiratory:  Negative for shortness of breath.    Cardiovascular:  Negative for palpitations.   Gastrointestinal:  Negative for nausea and vomiting.   Neurological:  Negative for dizziness and headaches.   Psychiatric/Behavioral:  Negative for hallucinations.         Physical Exam  Temp:  [36.8 °C (98.2 °F)-36.9 °C (98.5 °F)] 36.8 °C (98.2 °F)  Pulse:  [81-91] 81  Resp:  [16-18] 18  BP: (122-138)/(63-75) 138/75  SpO2:  [90 %-93 %] 93 %    Physical Exam  Vitals and nursing note reviewed.   Constitutional:       Appearance: Normal appearance.   HENT:      Head: Atraumatic.   Eyes:      Conjunctiva/sclera: Conjunctivae normal.      Pupils: Pupils are equal, round, and reactive to light.   Cardiovascular:      Rate and Rhythm: Normal rate and regular rhythm.      Heart sounds: No murmur heard.  Pulmonary:      Effort: Pulmonary effort is normal.      Breath sounds: No stridor. No wheezing or rales.   Abdominal:      General: There is no distension.      Palpations: Abdomen is soft.      Tenderness: There is no abdominal tenderness.   Musculoskeletal:      Cervical back: Normal range of motion and neck supple.      Right lower leg: No edema.      Left lower leg: No edema.   Skin:     General: Skin is warm and dry.      Findings: No rash.   Neurological:      Mental Status: She is alert and oriented to person, place, and time.   Psychiatric:         Mood and Affect: Mood normal.         Behavior: Behavior normal.         Fluids    Intake/Output Summary (Last 24 hours) at 7/12/2023 0849  Last data filed at 7/11/2023 1800  Gross per 24 hour   Intake 360 ml   Output --   Net 360 ml         Laboratory                             Assessment/Plan  * Ischemic stroke (HCC)- (present on admission)  Assessment & Plan  Cont ASA, Plavix  Cont Lipitor  Has ZioPatch    Hypertension  Assessment & Plan  BP ok  Cont Lisinopril  Cont to monitor    Type 2 diabetes mellitus without complication, without long-term current use of insulin (HCC)- (present on admission)  Assessment & Plan  Hba1c: 12.6 (7/2)  BS better after med change: 143 am --> 101 noon (7/12)  Cont Metformin: 850 mg bid --> will increase to 1000 mg bid (starting 7/13 am)  Cont Glargine: 18 units qam --> will d/c (last dose 7/12)  Note: home meds include Metformin & Glipizide 2.5 mg qalupe           has not taken her meds for > 8 years because 2nd to not getting regular medical care  Will try to get back on oral meds before discharge  Cont to monitor    Tobacco abuse- (present on admission)  Assessment & Plan  On Dulera  RT protocol

## 2023-07-12 NOTE — PROGRESS NOTES
NURSING DAILY NOTE    Name: Nyasia Schuster   Date of Admission: 7/5/2023   Admitting Diagnosis: Ischemic stroke (HCC)  Attending Physician: Unique Black M.d.  Allergies: Patient has no known allergies.    Safety  Patient Assist  min  Patient Precautions  Fall Risk  Precaution Comments  zio patch, RUE sling to prevent subluxation  Bed Transfer Status  Contact Guard Assist  Toilet Transfer Status   Standby Assist (stand pivot w/c<>toilet with GB)  Assistive Devices  Rails, Wheelchair  Oxygen  None - Room Air  Diet/Therapeutic Dining  Current Diet Order   Procedures    Diet Order Diet: Level 7 - Easy to Chew (meds whole with thins, Please add a T- rocker knife to all plates.); Liquid level: Level 0 - Thin; Second Modifier: (optional): Consistent CHO (Diabetic)     Pill Administration  whole  Agitated Behavioral Scale  14  ABS Level of Severity  No Agitation    Fall Risk  Has the patient had a fall this admission?   No  Deanne Cheng Fall Risk Scoring  12, MODERATE RISK  Fall Risk Safety Measures  bed alarm and chair alarm    Vitals  Temperature: 36.8 °C (98.2 °F)  Temp src: Oral  Pulse: 83  Respiration: 18  Blood Pressure : 138/66  Blood Pressure MAP (Calculated): 90 MM HG  BP Location: Left, Upper Arm  Patient BP Position: Sitting     Oxygen  Pulse Oximetry: 95 %  O2 (LPM): 0  O2 Delivery Device: None - Room Air  Incentive Spirometer Volume: 1500 mL    Bowel and Bladder  Last Bowel Movement  07/11/23  Stool Type  Type 4: Like a sausage or snake, smooth and soft  Bowel Device  Bathroom  Continent  Bladder: Continent void   Bowel: Continent movement  Bladder Function  Urine Void (mL):  (Moderate)  Number of Times Voided: 1  Urinary Options: Yes  Urine Color: Yellow  Number of Times Incontinent of Urine: 0  Genitourinary Assessment   Bladder Assessment (WDL):  WDL Except  Bhatt Catheter: Not Applicable  Urine Color: Yellow  Number of Bladder  Accidents: 0  Total Number of Bladder of Accidents in Last 7 Days: 0  Number of Times Incontinent of Urine: 0  Bladder Device: Bathroom  Bladder Scan: Post Void  $ Bladder Scan Results (mL): 82    Skin  Parveen Score   18  Sensory Interventions   Bed Types: Standard/Trauma Mattress  Skin Preventative Measures: Pillows in Use for Support / Positioning  Moisture Interventions         Pain  Pain Rating Scale  0 - No Pain  Pain Location  Shoulder  Pain Location Orientation  Right, Posterior  Pain Interventions   Declines    ADLs    Bathing   Shower, Staff (OT)  Linen Change      Personal Hygiene  Perineal Care (HANDWASHED)  Chlorhexidine Bath      Oral Care  Brushed Teeth  Teeth/Dentures     Shave     Nutrition Percentage Eaten  Lunch, Between 25-50% Consumed  Environmental Precautions  Treaded Slipper Socks on Patient, Bed in Low Position  Patient Turns/Positioning  Sitting Up in Wheelchair  Patient Turns Assistance/Tolerance  Standby Assist  Bed Positions  Bed Controls On, Bed Locked  Head of Bed Elevated  Self regulated      Psychosocial/Neurologic Assessment  Psychosocial Assessment  Psychosocial (WDL):  Within Defined Limits  Neurologic Assessment  Neuro (WDL): Exceptions to WDL  Level of Consciousness: Alert  Orientation Level: Oriented X4  Cognition: Follows commands, Appropriate attention/concentration, Appropriate safety awareness, Appropriate judgement  Speech: Clear  Pupil Assesment: Yes  R Pupil Size (mm): 3  R Pupil Shape / Description: Round  R Pupil Reaction: Brisk  L Pupil Size (mm): 3  L Pupil Shape / Description: Round  L Pupil Reaction: Brisk  Motor Function/Sensation Assessment: Motor strength  RUE Sensation: Full sensation  Muscle Strength Right Arm: Fair Strength against Gravity but No Resistance  LUE Sensation: Full sensation  Muscle Strength Left Arm: Good Strength Against Gravity and Moderate Resistance  RLE Sensation: Full sensation  Muscle Strength Right Leg: Fair Strength against Gravity but  No Resistance  LLE Sensation: Full sensation  Muscle Strength Left Leg: Good Strength Against Gravity and Moderate Resistance  EENT (WDL):  WDL Except    Cardio/Pulmonary Assessment  Edema      Respiratory Breath Sounds  RUL Breath Sounds: Clear  RML Breath Sounds: Diminished  RLL Breath Sounds: Diminished  OPAL Breath Sounds: Clear  LLL Breath Sounds: Diminished  Cardiac Assessment   Cardiac (WDL):  WDL Except (zio patch)

## 2023-07-12 NOTE — PROGRESS NOTES
Rehab Progress Note     Date of Service: 7/12/2023  Chief Complaint: Follow-up stroke    Interval Events (Subjective)    Patient seen and examined today in her room.  She continues to report weakness mostly in her right hand.  She also reports acute on chronic right shoulder pain.  She reports the pain is worse at night and when she reaches into her back pocket.  She worked as a  and so used her upper extremities extensively.  She has a history of carpal tunnel surgery releases bilaterally.  She last moved her bowels yesterday.    Patient has no other new questions, concerns, or complaints today.         Objective:  VITAL SIGNS: /75   Pulse 81   Temp 36.8 °C (98.2 °F) (Oral)   Resp 18   Ht 1.524 m (5')   Wt 79.8 kg (176 lb)   SpO2 93%   BMI 34.37 kg/m²   Gen: alert, no apparent distress  CV: Regular rate, regular rhythm, cardiac monitor in the left upper chest wall  Resp: Clear to auscultation bilaterally  Neuro: Right shoulder abduction 3/5, pain limited, right bicep/tricep 4/5, no finger flexion, does have some digit 5 finger extension  MSK: Tenderness to palpation over the right AC joint, right long head of the bicep tendon insertion, positive Neer's and Saeed sign    Recent Results (from the past 72 hour(s))   POCT glucose device results    Collection Time: 07/09/23 11:18 AM   Result Value Ref Range    POC Glucose, Blood 151 (H) 65 - 99 mg/dL   POCT glucose device results    Collection Time: 07/09/23  5:13 PM   Result Value Ref Range    POC Glucose, Blood 186 (H) 65 - 99 mg/dL   POCT glucose device results    Collection Time: 07/09/23 10:12 PM   Result Value Ref Range    POC Glucose, Blood 174 (H) 65 - 99 mg/dL   POCT glucose device results    Collection Time: 07/10/23  7:08 AM   Result Value Ref Range    POC Glucose, Blood 168 (H) 65 - 99 mg/dL   POCT glucose device results    Collection Time: 07/10/23 11:06 AM   Result Value Ref Range    POC Glucose, Blood 176 (H) 65 - 99 mg/dL   POCT  glucose device results    Collection Time: 07/10/23  5:06 PM   Result Value Ref Range    POC Glucose, Blood 150 (H) 65 - 99 mg/dL   POCT glucose device results    Collection Time: 07/10/23  8:39 PM   Result Value Ref Range    POC Glucose, Blood 170 (H) 65 - 99 mg/dL   POCT glucose device results    Collection Time: 07/11/23  7:31 AM   Result Value Ref Range    POC Glucose, Blood 164 (H) 65 - 99 mg/dL   POCT glucose device results    Collection Time: 07/11/23 11:33 AM   Result Value Ref Range    POC Glucose, Blood 148 (H) 65 - 99 mg/dL   POCT glucose device results    Collection Time: 07/11/23  5:18 PM   Result Value Ref Range    POC Glucose, Blood 118 (H) 65 - 99 mg/dL   POCT glucose device results    Collection Time: 07/11/23  8:38 PM   Result Value Ref Range    POC Glucose, Blood 166 (H) 65 - 99 mg/dL   POCT glucose device results    Collection Time: 07/12/23  7:31 AM   Result Value Ref Range    POC Glucose, Blood 143 (H) 65 - 99 mg/dL       Scheduled Medications   Medication Dose Frequency    metFORMIN  850 mg BID WITH MEALS    lisinopril  5 mg Q DAY    insulin lispro  2-12 Units 4X/DAY ACHS    insulin GLARGINE  18 Units QAM INSULIN    aspirin  81 mg DAILY    atorvastatin  40 mg Q EVENING    clopidogrel  75 mg DAILY    enoxaparin (LOVENOX) injection  40 mg DAILY AT 1800    lidocaine  2 Patch DAILY    nicotine  21 mg Daily-0600    senna-docusate  2 Tablet BID    nystatin  5 mL 4X/DAY    mometasone-formoterol  2 Puff BID (RT)    simethicone  125 mg TID WITH MEALS    loratadine  10 mg DAILY       Current Diet Order   Procedures    Diet Order Diet: Level 7 - Easy to Chew (meds whole with thins, Please add a T- rocker knife to all plates.); Liquid level: Level 0 - Thin; Second Modifier: (optional): Consistent CHO (Diabetic)       Assessment:    This patient is a 67 y.o. female admitted for acute inpatient rehabilitation with Ischemic stroke (HCC).      Unique GREEN M.D./MD., was present and led the  interdisciplinary team conference on 7/10/2023.  I led the IDT conference and agree with the IDT conference documentation and plan of care as noted below.     Nursing:  Diet Current Diet Order   Procedures    Diet Order Diet: Level 7 - Easy to Chew (meds whole with thins, Please add a T- rocker knife to all plates.); Liquid level: Level 0 - Thin; Second Modifier: (optional): Consistent CHO (Diabetic)       Eating ADL Supervision  Set-up of equipment or meal/tube feeding (See note for self-feeding eval details.)   % of Last Meal  Oral Nutrition: *  * Meal *  *, Dinner, Between % Consumed   Sleep No issues   Bowel Last BM: 07/09/23   Bladder Post Void  82   Barriers to Discharge Home: diabetic education      Physical Therapy:  Bed Mobility    Transfers Contact Guard Assist  Supervision for safety, Adaptive equipment (Stand pivot using quad cane from bed to w/c.)   Mobility Contact Guard Assist   Stairs Minimal Assist (CG/Mc)   Barriers to Discharge Home: stairs at home      Occupational Therapy:  Grooming Standby Assist (Seated in w/c)   Bathing Contact Guard Assist   UB Dressing Stand by Assist   LB Dressing Contact Guard Assist   Toileting Moderate Assist   Shower & Tub Transfer Contact Guard Assist   Barriers to Discharge Home: right arm weakness    Respiratory Therapy:  O2 (LPM): 0  O2 Delivery Device: None - Room Air    Case Management:  Continues to work on disposition and DME needs.      Discharge Date/Disposition:    7/17    HH: PT/OT/RN    Equip: shower bench, small based quad cane    Follow-ups: PCP, stroke bridge clinic, Dr. Haines      Problem List/Medical Decision Making and Plan:    Left MCA stroke  Right hemiparesis  PT/OT, 1.5 hr each discipline, 5 days per week    Continue aspirin and Plavix for 10 days, then Plavix alone    Continue atorvastatin    Patient has cardiac monitor in place, to be returned 7/19, after discussion    Outpatient follow-up with stroke [facility] clinic and Dr. Haines,  referrals made    Right shoulder pain, acute on chronic  Exam suggestive of rotator cuff tendinitis/bursitis  Check x-ray  Consider steroid injection  Support hemiplegic arm    Diabetes with hyperglycemia  Continue metformin  Glargine discontinued  Continue sliding scale insulin  Goal to get off insulin prior to discharge  Appreciate hospitalist assistance  Needs glucometer at discharge     Tobacco abuse  Continue nicotine patch and gum  Will need counseling    Hypertension  Continue lisinopril    Upper back pain, resolved  Discontinue Lidoderm patches    Oral thrush, resolved  Completed nystatin     Seasonal allergies  Continue Claritin     Respiratory failure with hypoxia  Likely due to undiagnosed COPD  Continue Dulera  Titrated off oxygen     Abdominal gas, resolved  Completed simethicone    Constipation, resolved  Continue Senokot  Last bowel movement 7/11    DVT prophylaxis  Continue Lovenox    Unique Black M.D.  Physical Medicine and Rehabilitation

## 2023-07-12 NOTE — THERAPY
Physical Therapy   Daily Treatment     Patient Name: Nyasia Schuster  Age:  67 y.o., Sex:  female  Medical Record #: [id number]  Today's Date: 7/12/2023     Precautions  Precautions: Fall Risk  Comments: zio patch    Subjective    Patient agreeable to session. Has no complaints.       Objective       07/12/23 1031   PT Charge Group   PT Gait Training (Units) 2   PT Therapeutic Activities (Units) 2   PT Total Time Spent   PT Individual Total Time Spent (Mins) 60   Vitals   Pulse 83   Patient BP Position Sitting   Blood Pressure  138/66   Pulse Oximetry 95 %   O2 (LPM) 0   Vitals Comments remains stable throughout session.   Stairs Functional Level of Assist   Level of Assist with Stairs Contact Guard Assist   # of Stairs Climbed 12  (and step up to treadmill x 1 w/ LUE support)   Stairs Description Extra time;Hand rails;Supervision for safety;Verbal cueing  (single railing, step to pattern, cues for leading leg)   Transfer Functional Level of Assist   Bed, Chair, Wheelchair Transfer Contact Guard Assist   Bed Chair Wheelchair Transfer Description Adaptive equipment;Increased time;Set-up of equipment;Supervision for safety;Verbal cueing   Bed Mobility    Sit to Stand Supervised   Interdisciplinary Plan of Care Collaboration   Patient Position at End of Therapy Seated;Chair Alarm On;Self Releasing Lap Belt Applied  (dining room)     Lite Gait BWSTT: harness for safety only, no BWS. Donned/doffed sling in standing    Distance Speed Time Incline   537ft .5-1.5mph 5:02    486ft 1.5-1.7 3:27    326 1.5 2:34 2         *PT provides VC and TC throughout to facilitate posture, step through RLE, and gait mechanics.   7/10 RPE reported with each bout.     Assessment    Patient tolerates session well, tends to forward flex and dec step through RLE w/ fatigue. Vitals remains stable throughout. Mild extensor thrust during terminal stance on RLE as well.     Strengths: Able to follow instructions, Alert and oriented, Good insight  into deficits/needs, Independent prior level of function, Manages pain appropriately, Motivated for self care and independence, Pleasant and cooperative, Supportive family, Willingly participates in therapeutic activities  Barriers: Decreased endurance, Fatigue, Hemiparesis, Home accessibility, Impaired activity tolerance, Impaired balance, Limited mobility    Plan  Ambulation w/ SBQC  Activity tolerance  Standing balance  Stairs training w/ LHR  RUE forced use and WB/ reassess FWW   CKC RLE exercise          Passport items to be completed:  Get in/out of bed safely, in/out of a vehicle, safely use mobility device, walk or wheel around home/community, navigate up and down stairs, show how to get up/down from the ground, ensure home is accessible, demonstrate HEP, complete caregiver training       Physical Therapy Problems (Active)       Problem: Balance       Dates: Start:  07/06/23         Goal: STG-Within one week, patient will tolerate Delgadillo Balance Scale assessment.       Dates: Start:  07/06/23               Problem: Mobility       Dates: Start:  07/06/23         Goal: STG-Within one week, patient will ambulate up/down flight of stairs with LUE support and SBA-CGA.       Dates: Start:  07/06/23               Problem: Mobility Transfers       Dates: Start:  07/06/23         Goal: STG-Within one week, patient will perform bed mobility independently.       Dates: Start:  07/06/23               Problem: PT-Long Term Goals       Dates: Start:  07/06/23         Goal: LTG-By discharge, patient will ambulate household distances mod I with LRAD, community distances supervised with LRAD.       Dates: Start:  07/06/23            Goal: LTG-By discharge, patient will transfer one surface to another with LRAD mod I.       Dates: Start:  07/06/23            Goal: LTG-By discharge, patient will ambulate up/down flight of stairs with LUE support and supervision.       Dates: Start:  07/06/23            Goal: LTG-By discharge,  patient will transfer in/out of a car with LRAD and supervision.       Dates: Start:  07/06/23

## 2023-07-12 NOTE — FLOWSHEET NOTE
07/12/23 0611   Events/Summary/Plan   Events/Summary/Plan mdi   Vital Signs   Pulse 83   Respiration 18   Pulse Oximetry 90 %   $ Pulse Oximetry (Spot Check) Yes   Respiratory Assessment   Level of Consciousness Alert   Chest Exam   Work Of Breathing / Effort Within Normal Limits   Breath Sounds   RUL Breath Sounds Clear   RML Breath Sounds Diminished   RLL Breath Sounds Diminished   OPAL Breath Sounds Clear   LLL Breath Sounds Diminished   Oxygen   O2 Delivery Device Room air w/o2 available

## 2023-07-12 NOTE — THERAPY
Physical Therapy   Daily Treatment     Patient Name: Nyasia Schuster  Age:  67 y.o., Sex:  female  Medical Record #: [id number]  Today's Date: 2023     Precautions  Precautions: Fall Risk  Comments: zio patch, RUE sling to prevent subluxation    Subjective    Pt received up in  with half finished bfast visiting with sister, agreeable to participate. Reported that she did not sleep well last night d/t her roommate. Sister left at beginning of session.      Objective       23 0831   PT Charge Group   PT Gait Training (Units) 4   PT Total Time Spent   PT Individual Total Time Spent (Mins) 60   Gait Functional Level of Assist    Gait Level Of Assist Contact Guard Assist   Assistive Device Quad Cane;Other (Comments)  (WBQC and RUE sling for comfort)   Distance (Feet) 130   # of Times Distance was Traveled 4  (see PT note for gait training details)   Deviation Decreased Base Of Support;Bradykinetic;Decreased Heel Strike;Decreased Toe Off  (more frequent R knee hyperextension in stance as pt fatigued)   Transfer Functional Level of Assist   Bed, Chair, Wheelchair Transfer Contact Guard Assist   Bed Chair Wheelchair Transfer Description Adaptive equipment;Increased time;Set-up of equipment;Supervision for safety;Verbal cueing  (stand step WBQC)   Sitting Lower Body Exercises   Sit to Stand   (x15 and x10 seat taps only to wc, no UE support)   Bed Mobility    Sit to Stand Supervised   Interdisciplinary Plan of Care Collaboration   Patient Position at End of Therapy Seated;Chair Alarm On;Call Light within Reach;Tray Table within Reach;Phone within Reach     Gait traininst bout - 3 point step to pattern  2nd bout - provided pt edu re: 3 vs 2 point pattern and associated energy efficiencies, pt able to progress to 2 point step to pattern   Part practice simultaneous RLE and QC advancement x5  Part practice simultaneous RLE and QC advancement plus LLE step through to target dots >> added additional step  through for 2 consecutive steps approx x4-5 min for high rep practice  3rd and 4th bouts - 2 point step through pattern, inconsistent although pt able to self correct sequencing errors    Assessment    Pt tolerated session well focused on gait training with WBQC. Required cueing to inc her JELENA and follow 2 point step through gait pattern. Pt irasema improved sequencing after high rep part practice as described above but would benefit from further training.    Strengths: Able to follow instructions, Alert and oriented, Good insight into deficits/needs, Independent prior level of function, Manages pain appropriately, Motivated for self care and independence, Pleasant and cooperative, Supportive family, Willingly participates in therapeutic activities  Barriers: Decreased endurance, Fatigue, Hemiparesis, Home accessibility, Impaired activity tolerance, Impaired balance, Limited mobility    Plan    Ambulation w/ SBQC  Activity tolerance  Standing balance  Stairs training w/ LHR  RUE forced use and WB/ reassess FWW   CKC RLE exercise    Passport items to be completed:  Get in/out of bed safely, in/out of a vehicle, safely use mobility device, walk or wheel around home/community, navigate up and down stairs, show how to get up/down from the ground, ensure home is accessible, demonstrate HEP, complete caregiver training    Physical Therapy Problems (Active)       Problem: Balance       Dates: Start:  07/06/23         Goal: STG-Within one week, patient will tolerate Delgadillo Balance Scale assessment.       Dates: Start:  07/06/23               Problem: Mobility       Dates: Start:  07/06/23         Goal: STG-Within one week, patient will ambulate up/down flight of stairs with LUE support and SBA-CGA.       Dates: Start:  07/06/23               Problem: Mobility Transfers       Dates: Start:  07/06/23         Goal: STG-Within one week, patient will perform bed mobility independently.       Dates: Start:  07/06/23                Problem: PT-Long Term Goals       Dates: Start:  07/06/23         Goal: LTG-By discharge, patient will ambulate household distances mod I with LRAD, community distances supervised with LRAD.       Dates: Start:  07/06/23            Goal: LTG-By discharge, patient will transfer one surface to another with LRAD mod I.       Dates: Start:  07/06/23            Goal: LTG-By discharge, patient will ambulate up/down flight of stairs with LUE support and supervision.       Dates: Start:  07/06/23            Goal: LTG-By discharge, patient will transfer in/out of a car with LRAD and supervision.       Dates: Start:  07/06/23               no

## 2023-07-12 NOTE — CARE PLAN
The patient is Stable - Low risk of patient condition declining or worsening    Shift Goals  Clinical Goals: safety  Patient Goals: sleep    Progress made toward(s) clinical / shift goals:    Problem: Fall Risk - Rehab  Goal: Patient will remain free from falls  Outcome: Progressing. Patient bed in lowest locked position with bed alarm on and call light within reach. Patient calls appropriately with call light. Patient has not been impulsive over shift.      Problem: Pain - Standard  Goal: Alleviation of pain or a reduction in pain to the patient’s comfort goal  Outcome: Progressing. Patient requested prn tylenol at bedtime for generalized achiness, stated pain is at a comfortable level with tylenol.       Patient BG at bedtime was 166, received 2 U at this time.

## 2023-07-13 ENCOUNTER — APPOINTMENT (OUTPATIENT)
Dept: PHYSICAL THERAPY | Facility: REHABILITATION | Age: 68
DRG: 056 | End: 2023-07-13
Attending: PHYSICAL MEDICINE & REHABILITATION
Payer: MEDICARE

## 2023-07-13 ENCOUNTER — APPOINTMENT (OUTPATIENT)
Dept: SPEECH THERAPY | Facility: REHABILITATION | Age: 68
DRG: 056 | End: 2023-07-13
Attending: PHYSICAL MEDICINE & REHABILITATION
Payer: MEDICARE

## 2023-07-13 ENCOUNTER — APPOINTMENT (OUTPATIENT)
Dept: RADIOLOGY | Facility: REHABILITATION | Age: 68
DRG: 056 | End: 2023-07-13
Attending: PHYSICAL MEDICINE & REHABILITATION
Payer: MEDICARE

## 2023-07-13 ENCOUNTER — APPOINTMENT (OUTPATIENT)
Dept: OCCUPATIONAL THERAPY | Facility: REHABILITATION | Age: 68
DRG: 056 | End: 2023-07-13
Attending: PHYSICAL MEDICINE & REHABILITATION
Payer: MEDICARE

## 2023-07-13 LAB
GLUCOSE BLD STRIP.AUTO-MCNC: 118 MG/DL (ref 65–99)
GLUCOSE BLD STRIP.AUTO-MCNC: 120 MG/DL (ref 65–99)
GLUCOSE BLD STRIP.AUTO-MCNC: 142 MG/DL (ref 65–99)
GLUCOSE BLD STRIP.AUTO-MCNC: 146 MG/DL (ref 65–99)

## 2023-07-13 PROCEDURE — 770010 HCHG ROOM/CARE - REHAB SEMI PRIVAT*

## 2023-07-13 PROCEDURE — 82962 GLUCOSE BLOOD TEST: CPT

## 2023-07-13 PROCEDURE — 99232 SBSQ HOSP IP/OBS MODERATE 35: CPT | Performed by: PHYSICAL MEDICINE & REHABILITATION

## 2023-07-13 PROCEDURE — 73030 X-RAY EXAM OF SHOULDER: CPT | Mod: RT

## 2023-07-13 PROCEDURE — 700102 HCHG RX REV CODE 250 W/ 637 OVERRIDE(OP): Performed by: HOSPITALIST

## 2023-07-13 PROCEDURE — 97112 NEUROMUSCULAR REEDUCATION: CPT

## 2023-07-13 PROCEDURE — A9270 NON-COVERED ITEM OR SERVICE: HCPCS | Performed by: HOSPITALIST

## 2023-07-13 PROCEDURE — 97530 THERAPEUTIC ACTIVITIES: CPT | Mod: CQ

## 2023-07-13 PROCEDURE — 97110 THERAPEUTIC EXERCISES: CPT | Mod: CQ

## 2023-07-13 PROCEDURE — 97116 GAIT TRAINING THERAPY: CPT | Mod: CQ

## 2023-07-13 PROCEDURE — 94760 N-INVAS EAR/PLS OXIMETRY 1: CPT

## 2023-07-13 PROCEDURE — 97112 NEUROMUSCULAR REEDUCATION: CPT | Mod: CQ

## 2023-07-13 PROCEDURE — A9270 NON-COVERED ITEM OR SERVICE: HCPCS | Performed by: PHYSICAL MEDICINE & REHABILITATION

## 2023-07-13 PROCEDURE — 99232 SBSQ HOSP IP/OBS MODERATE 35: CPT | Performed by: HOSPITALIST

## 2023-07-13 PROCEDURE — 94640 AIRWAY INHALATION TREATMENT: CPT

## 2023-07-13 PROCEDURE — 700102 HCHG RX REV CODE 250 W/ 637 OVERRIDE(OP): Performed by: PHYSICAL MEDICINE & REHABILITATION

## 2023-07-13 PROCEDURE — 700111 HCHG RX REV CODE 636 W/ 250 OVERRIDE (IP): Mod: JZ | Performed by: PHYSICAL MEDICINE & REHABILITATION

## 2023-07-13 RX ADMIN — NICOTINE TRANSDERMAL SYSTEM 21 MG: 21 PATCH, EXTENDED RELEASE TRANSDERMAL at 05:10

## 2023-07-13 RX ADMIN — ASPIRIN 81 MG CHEWABLE TABLET 81 MG: 81 TABLET CHEWABLE at 08:32

## 2023-07-13 RX ADMIN — MOMETASONE FUROATE AND FORMOTEROL FUMARATE DIHYDRATE 2 PUFF: 200; 5 AEROSOL RESPIRATORY (INHALATION) at 20:12

## 2023-07-13 RX ADMIN — LORATADINE 10 MG: 10 TABLET ORAL at 08:32

## 2023-07-13 RX ADMIN — ACETAMINOPHEN 650 MG: 325 TABLET ORAL at 20:20

## 2023-07-13 RX ADMIN — LISINOPRIL 5 MG: 5 TABLET ORAL at 05:09

## 2023-07-13 RX ADMIN — MOMETASONE FUROATE AND FORMOTEROL FUMARATE DIHYDRATE 2 PUFF: 200; 5 AEROSOL RESPIRATORY (INHALATION) at 06:10

## 2023-07-13 RX ADMIN — METFORMIN HYDROCHLORIDE 1000 MG: 500 TABLET ORAL at 17:08

## 2023-07-13 RX ADMIN — ATORVASTATIN CALCIUM 40 MG: 40 TABLET, FILM COATED ORAL at 20:13

## 2023-07-13 RX ADMIN — CLOPIDOGREL BISULFATE 75 MG: 75 TABLET ORAL at 08:32

## 2023-07-13 RX ADMIN — ENOXAPARIN SODIUM 40 MG: 100 INJECTION SUBCUTANEOUS at 17:08

## 2023-07-13 RX ADMIN — METFORMIN HYDROCHLORIDE 1000 MG: 500 TABLET ORAL at 08:32

## 2023-07-13 ASSESSMENT — ENCOUNTER SYMPTOMS
FEVER: 0
NERVOUS/ANXIOUS: 0
DIARRHEA: 0
COUGH: 0
BLURRED VISION: 0
DIZZINESS: 0

## 2023-07-13 ASSESSMENT — ACTIVITIES OF DAILY LIVING (ADL)
TOILET_TRANSFER_DESCRIPTION: ADAPTIVE EQUIPMENT;GRAB BAR;SUPERVISION FOR SAFETY
BED_CHAIR_WHEELCHAIR_TRANSFER_DESCRIPTION: ADAPTIVE EQUIPMENT;INITIAL PREPARATION FOR TASK;SUPERVISION FOR SAFETY;VERBAL CUEING

## 2023-07-13 ASSESSMENT — GAIT ASSESSMENTS
DEVIATION: DECREASED BASE OF SUPPORT;BRADYKINETIC;DECREASED HEEL STRIKE;DECREASED TOE OFF
ASSISTIVE DEVICE: QUAD CANE
GAIT LEVEL OF ASSIST: CONTACT GUARD ASSIST

## 2023-07-13 NOTE — PROGRESS NOTES
NURSING DAILY NOTE    Name: Nyasia Schuster  Date of Admission: 7/5/2023  Admitting Diagnosis: Ischemic stroke (HCC)  Attending Physician: Unique Black M.d.  Allergies: Patient has no known allergies.    Safety  Patient Assist  ocga-min  Patient Precautions  oFall Risk  Precaution Comments  ozio patch, RUE sling to prevent subluxation  Bed Transfer Status  oContact Guard Assist  Toilet Transfer Status  oStandby Assist (stand pivot w/c<>toilet with GB)  Assistive Devices  oRails, Wheelchair  Oxygen  oNone - Room Air  Diet/Therapeutic Dining  o  Current Diet Order   Procedures    Diet Order Diet: Level 7 - Easy to Chew (meds whole with thins, Please add a T- rocker knife to all plates.); Liquid level: Level 0 - Thin; Second Modifier: (optional): Consistent CHO (Diabetic)     Pill Administration  owhole  Agitated Behavioral Scale  o14  ABS Level of Severity  Lily Agitation    Fall Risk  Has the patient had a fall this admission?  Lily  Deanne Cheng Fall Risk Scoring  o12, MODERATE RISK  Fall Risk Safety Measures  jessie alarm and chair alarm    Vitals  Temperature: 36.8 °C (98.3 °F)  Temp src: Oral  Pulse: 93  Respiration: 18  Blood Pressure : (!) 152/75  Blood Pressure MAP (Calculated): 101 MM HG  BP Location: Left, Upper Arm  Patient BP Position: Sitting    Oxygen  Pulse Oximetry: 91 %  O2 (LPM): 0  O2 Delivery Device: None - Room Air  Incentive Spirometer Volume: 1500 mL    Bowel and Bladder  Last Bowel Movement  o07/12/23  Stool Type  oType 6: Fluffy pieces with ragged edges, a mushy stool  Bowel Device  oBathroom  Continent  oBladder: Continent void  oBowel: Continent movement  Bladder Function  oUrine Void (mL):  (large)  Number of Times Voided: 1  Urinary Options: Yes  Urine Color: Yellow  Number of Times Incontinent of Urine: 0  Genitourinary Assessment  oBladder Assessment (WDL):  WDL Except  Bhatt Catheter: Not Applicable  Urine Color: Yellow  Number of Bladder Accidents: 0  Total Number of Bladder  of Accidents in Last 7 Days: 0  Number of Times Incontinent of Urine: 0  Bladder Device: Bathroom  Bladder Scan: Post Void  $ Bladder Scan Results (mL): 82    Skin  Parveen Score  o 18  Sensory Interventions  o Bed Types: Standard/Trauma Mattress  Skin Preventative Measures: Pillows in Use for Support / Positioning  Moisture Interventions  o       Pain  Pain Rating Scale  o0 - No Pain  Pain Location  oShoulder  Pain Location Orientation  oRight, Posterior  Pain Interventions  oDeclines    ADLs    Bathing  oShower, Staff (OT)  Linen Change  o   Personal Hygiene  oPerineal Care (HANDWASHED)  Chlorhexidine Bath  o   Oral Care  oBrushed Teeth  Teeth/Dentures  o   Shave  o   Nutrition Percentage Eaten  oDinner, Between % Consumed  Environmental Precautions  Antony in Low Position  Patient Turns/Positioning  oSitting Up in Wheelchair  Patient Turns Assistance/Tolerance  oStandby Assist  Bed Positions  Antony Controls On, Bed Locked  Head of Bed Elevated  oSelf regulated      Psychosocial/Neurologic Assessment  Psychosocial Assessment  oPsychosocial (WDL):  Within Defined Limits  Neurologic Assessment  oNeuro (WDL): Exceptions to WDL  Level of Consciousness: Alert  Orientation Level: Oriented X4  Cognition: Follows commands, Appropriate attention/concentration, Appropriate safety awareness, Appropriate judgement  Speech: Clear  Pupil Assesment: Yes  R Pupil Size (mm): 3  R Pupil Shape / Description: Round  R Pupil Reaction: Brisk  L Pupil Size (mm): 3  L Pupil Shape / Description: Round  L Pupil Reaction: Brisk  Motor Function/Sensation Assessment: Motor strength  RUE Sensation: Full sensation  Muscle Strength Right Arm: Fair Strength against Gravity but No Resistance  LUE Sensation: Full sensation  Muscle Strength Left Arm: Good Strength Against Gravity and Moderate Resistance  RLE Sensation: Full sensation  Muscle Strength Right Leg: Fair Strength against Gravity but No Resistance  LLE Sensation: Full sensation  Muscle  Strength Left Leg: Good Strength Against Gravity and Moderate Resistance  oEENT (WDL):  WDL Except    Cardio/Pulmonary Assessment  Edema  o   Respiratory Breath Sounds  oRUL Breath Sounds: Diminished  RML Breath Sounds: Diminished  RLL Breath Sounds: Diminished  OPAL Breath Sounds: Diminished  LLL Breath Sounds: Diminished  Cardiac Assessment  oCardiac (WDL):  WDL Except

## 2023-07-13 NOTE — THERAPY
Occupational Therapy  Daily Treatment     Patient Name: Nyasia Schuster  Age:  67 y.o., Sex:  female  Medical Record #: [id number]  Today's Date: 7/13/2023     Precautions  Precautions: Fall Risk  Comments: zio patch, RUE sling to prevent subluxation         Subjective    Pt seated in w/c upon arrival. Pt pleasant and cooperative, agreeable to therapy.      Objective       07/13/23 1301   OT Total Time Spent   OT Individual Total Time Spent (Mins) 60   Precautions   Precautions Fall Risk   Comments zio patch, RUE sling to prevent subluxation   Vitals   O2 Delivery Device None - Room Air   Pain   Intervention Declines   Cognition    Level of Consciousness Alert   Sleep/Wake Cycle   Sleep & Rest Awake   Fine Motor / Dexterity    Fine Motor / Dexterity Yes   Fine Motor / Dexterity Interventions Pt removed 5 large pegs from peg board with RUE with functional e-stim. Pt removed 5 large pegs from pegboard placed at level 3 incline. Pt removed 5 large pegs from level 5 incline standing next to mat table with SBA. All pegs were removed with by the RUE using a fine pincer grasp with digits 1 and 2.   Neuro-Muscular Treatments   Neuro-Muscular Treatments Electrical Stimulation   Comments Functional NMES at 25mA applied over wrist extensors for 35 min with good tolerance and muscle activation noted. Used as active assist for digit/wrist extension during pegboard activity. Placed again for 15 min at 25mA over wrist extensors for grasping activity.   Interdisciplinary Plan of Care Collaboration   Patient Position at End of Therapy In Bed;Bed Alarm On;Call Light within Reach;Tray Table within Reach     Pt completed block practice grasping 1 large cone, 1 small cone, 2 rolls of tape, 4 large pegs, lax ball, and 7 kooshes with the RUE using an ulnar palmar grasp. Pt placed all items into bucket on floor in sitting then into bucket on mat table in standing. Functional NMES applied during activity.    Potential trigger finger in  digit 3.    Assessment    Pt required two attempts for grasping a large peg at top right corner of pegboard placed at level 3 incline. Pt using ulnar palmar grasp to hold items as digits 4 and 5 have limited strength and movement. Potential trigger finger in digit 3 hindering grasp as finger would get caught under items, but pt was able to pop out using LUE. Pt using momentum instead of RUE strength to place items into bucket on mat table. Pt hitting RUE on table and bucket. Pt had no awareness of R UE hits while completing the activity.    Strengths: Able to follow instructions, Alert and oriented, Effective communication skills, Good carryover of learning, Independent prior level of function, Good insight into deficits/needs, Making steady progress towards goals, Motivated for self care and independence, Pleasant and cooperative, Supportive family, Willingly participates in therapeutic activities  Barriers: Bowel incontinence, Decreased endurance, Fatigue, Generalized weakness, Hemiplegia, Home accessibility, Impaired activity tolerance, Impaired balance, Limited mobility    Plan    Continue RUE neuro re-ed. Increase digit and wrist strength and ROM.    Passport items to be completed:  Perform bathroom transfers, complete dressing, complete feeding, get ready for the day, prepare a simple meal, participate in household tasks, adapt home for safety needs, demonstrate home exercise program, complete caregiver training     Occupational Therapy Goals (Active)       Problem: Bathing       Dates: Start:  07/06/23         Goal: STG-Within one week, patient will bathe at SBA using DME/AE PRN.       Dates: Start:  07/06/23         Goal Note filed on 07/10/23 1142 by Yajaira Pickard MS,OTR/L       Requires CGA                 Problem: Eating       Dates: Start:  07/06/23         Goal: STG-Within one week, patient will feed self at Mod I level using AE PRN.       Dates: Start:  07/06/23         Goal Note filed on  07/10/23 1142 by Yajaira Pickard MS,OTR/L       Requires set-up assist                 Problem: Functional Transfers       Dates: Start:  07/06/23         Goal: STG-Within one week, patient will transfer to toilet at SBA level using DME PRN.       Dates: Start:  07/06/23         Goal Note filed on 07/10/23 1142 by Yajaira Pickard MS,OTR/L       Requires CGA with use quad cane                 Problem: OT Long Term Goals       Dates: Start:  07/06/23         Goal: LTG-By discharge, patient will complete basic self care tasks at SBA-Mod I level using DME/AE PRN.       Dates: Start:  07/06/23            Goal: LTG-By discharge, patient will perform bathroom transfers at SPV-Mod I level using DME/AE PRN.       Dates: Start:  07/06/23            Goal: LTG-By discharge, patient will complete basic home management at Min A-Mod I level using DME/AE PRN.       Dates: Start:  07/06/23               Problem: Toileting       Dates: Start:  07/06/23         Goal: STG-Within one week, patient will complete toileting tasks at CGA level using DME PRN.       Dates: Start:  07/06/23         Goal Note filed on 07/10/23 1142 by Yajaira Pickard MS,OTR/L       Requires Min A

## 2023-07-13 NOTE — DISCHARGE PLANNING
DPA    Home health sent to [facility].    Dme sent to [facility].

## 2023-07-13 NOTE — FLOWSHEET NOTE
07/13/23 0610   Events/Summary/Plan   Events/Summary/Plan mdi   Vital Signs   Pulse 76   Respiration 18   Pulse Oximetry 94 %   $ Pulse Oximetry (Spot Check) Yes   Respiratory Assessment   Level of Consciousness Alert   Chest Exam   Work Of Breathing / Effort Within Normal Limits   Breath Sounds   RUL Breath Sounds Diminished   RML Breath Sounds Diminished   RLL Breath Sounds Diminished   OPAL Breath Sounds Diminished   Oxygen   O2 Delivery Device Room air w/o2 available  (Sitting up in wheelchair)

## 2023-07-13 NOTE — CARE PLAN
The patient is Stable - Low risk of patient condition declining or worsening    Shift Goals  Clinical Goals: safety  Patient Goals: sleep    Progress made toward(s) clinical / shift goals:    Problem: Fall Risk - Rehab  Goal: Patient will remain free from falls  Outcome: Progressing. Patient bed in lowest locked position with bed alarm on and call light within reach. Patient calls appropriately with call light for needs.      Problem: Pain - Standard  Goal: Alleviation of pain or a reduction in pain to the patient’s comfort goal  Outcome: Progressing. Patient denies having any pain over shift. Has prn tylenol available as needed.

## 2023-07-13 NOTE — PROGRESS NOTES
Rehab Progress Note     Date of Service: 7/13/2023  Chief Complaint: Follow-up stroke    Interval Events (Subjective)    Patient seen and examined today in her room.  She reports poor sleep for the last 3 nights due to interruptions by her roommate.  Shoulder x-ray is pending.  She has some mild hypertension.  We discussed tobacco cessation.  She reports she did quit previously for 5 years but then restarted.  She is unsure if she wants to quit but is interested in information about how to be successful.  She has a sling for her right arm to help with her right shoulder pain.  She last moved her bowels yesterday.    Patient has no other new questions, concerns, or complaints today.           Objective:  VITAL SIGNS: BP (!) 141/72   Pulse 80   Temp 36.6 °C (97.8 °F) (Oral)   Resp 18   Ht 1.524 m (5')   Wt 79.8 kg (176 lb)   SpO2 91%   BMI 34.37 kg/m²   Gen: alert, no apparent distress  CV: Regular rate, regular rhythm  Resp: Clear to auscultation bilaterally  Neuro: Right arm/hand weakness, improved compared to admission    Recent Results (from the past 72 hour(s))   POCT glucose device results    Collection Time: 07/10/23 11:06 AM   Result Value Ref Range    POC Glucose, Blood 176 (H) 65 - 99 mg/dL   POCT glucose device results    Collection Time: 07/10/23  5:06 PM   Result Value Ref Range    POC Glucose, Blood 150 (H) 65 - 99 mg/dL   POCT glucose device results    Collection Time: 07/10/23  8:39 PM   Result Value Ref Range    POC Glucose, Blood 170 (H) 65 - 99 mg/dL   POCT glucose device results    Collection Time: 07/11/23  7:31 AM   Result Value Ref Range    POC Glucose, Blood 164 (H) 65 - 99 mg/dL   POCT glucose device results    Collection Time: 07/11/23 11:33 AM   Result Value Ref Range    POC Glucose, Blood 148 (H) 65 - 99 mg/dL   POCT glucose device results    Collection Time: 07/11/23  5:18 PM   Result Value Ref Range    POC Glucose, Blood 118 (H) 65 - 99 mg/dL   POCT glucose device results     Collection Time: 07/11/23  8:38 PM   Result Value Ref Range    POC Glucose, Blood 166 (H) 65 - 99 mg/dL   POCT glucose device results    Collection Time: 07/12/23  7:31 AM   Result Value Ref Range    POC Glucose, Blood 143 (H) 65 - 99 mg/dL   POCT glucose device results    Collection Time: 07/12/23 11:08 AM   Result Value Ref Range    POC Glucose, Blood 101 (H) 65 - 99 mg/dL   POCT glucose device results    Collection Time: 07/12/23  5:09 PM   Result Value Ref Range    POC Glucose, Blood 108 (H) 65 - 99 mg/dL   POCT glucose device results    Collection Time: 07/12/23  8:08 PM   Result Value Ref Range    POC Glucose, Blood 140 (H) 65 - 99 mg/dL       Scheduled Medications   Medication Dose Frequency    metFORMIN  1,000 mg BID WITH MEALS    lisinopril  5 mg Q DAY    insulin lispro  2-12 Units 4X/DAY ACHS    aspirin  81 mg DAILY    atorvastatin  40 mg Q EVENING    clopidogrel  75 mg DAILY    enoxaparin (LOVENOX) injection  40 mg DAILY AT 1800    nicotine  21 mg Daily-0600    senna-docusate  2 Tablet BID    mometasone-formoterol  2 Puff BID (RT)    loratadine  10 mg DAILY       Current Diet Order   Procedures    Diet Order Diet: Level 7 - Easy to Chew (meds whole with thins, Please add a T- rocker knife to all plates.); Liquid level: Level 0 - Thin; Second Modifier: (optional): Consistent CHO (Diabetic)       Radiology    Imaging personally reviewed and interpreted by me.    DX-SHOULDER 2+ RIGHT   Final Result         1. No acute osseous abnormality.          Assessment:    This patient is a 67 y.o. female admitted for acute inpatient rehabilitation with Ischemic stroke (HCC).      I, Unique Black M.D./MD., was present and led the interdisciplinary team conference on 7/10/2023.  I led the IDT conference and agree with the IDT conference documentation and plan of care as noted below.     Nursing:  Diet Current Diet Order   Procedures    Diet Order Diet: Level 7 - Easy to Chew (meds whole with thins, Please add  a T- rocker knife to all plates.); Liquid level: Level 0 - Thin; Second Modifier: (optional): Consistent CHO (Diabetic)       Eating ADL Supervision  Set-up of equipment or meal/tube feeding (See note for self-feeding eval details.)   % of Last Meal  Oral Nutrition: *  * Meal *  *, Dinner, Between % Consumed   Sleep No issues   Bowel Last BM: 07/09/23   Bladder Post Void  82   Barriers to Discharge Home: diabetic education      Physical Therapy:  Bed Mobility    Transfers Contact Guard Assist  Supervision for safety, Adaptive equipment (Stand pivot using quad cane from bed to w/c.)   Mobility Contact Guard Assist   Stairs Minimal Assist (CG/Mc)   Barriers to Discharge Home: stairs at home      Occupational Therapy:  Grooming Standby Assist (Seated in w/c)   Bathing Contact Guard Assist   UB Dressing Stand by Assist   LB Dressing Contact Guard Assist   Toileting Moderate Assist   Shower & Tub Transfer Contact Guard Assist   Barriers to Discharge Home: right arm weakness    Respiratory Therapy:  O2 (LPM): 0  O2 Delivery Device: None - Room Air    Case Management:  Continues to work on disposition and DME needs.      Discharge Date/Disposition:    7/17    HH: PT/OT/RN    Equip: shower bench, small based quad cane    Follow-ups: PCP, stroke bridge clinic, Dr. Haines      Problem List/Medical Decision Making and Plan:    Left MCA stroke  Right hemiparesis  PT/OT, 1.5 hr each discipline, 5 days per week    Continue aspirin and Plavix for 10 days, then Plavix alone    Continue atorvastatin    Patient has cardiac monitor in place, to be returned 7/19, after discussion    Outpatient follow-up with stroke Bridge clinic and Dr. Haines, referrals made    Right shoulder pain, acute on chronic  Exam suggestive of rotator cuff tendinitis/bursitis  X-rays without acute abnormalities  Support hemiplegic arm    Diabetes with hyperglycemia  Continue metformin and sliding scale insulin  Appreciate hospitalist  assistance  Needs glucometer at discharge     Tobacco abuse  Continue nicotine patch and gum  Counseling provided today    Hypertension  Continue lisinopril  Appreciate hospitalist assistance    Upper back pain, resolved  Discontinue Lidoderm patches    Oral thrush, resolved  Completed nystatin     Seasonal allergies  Continue Claritin    Respiratory failure with hypoxia  Likely due to undiagnosed COPD  Continue Dulera  Titrated off oxygen     Abdominal gas, resolved  Completed simethicone    Constipation, resolved  Continue Senokot as needed  Last bowel movement 7/12    DVT prophylaxis  Continue Lovenox    Unique Black M.D.  Physical Medicine and Rehabilitation

## 2023-07-13 NOTE — PROGRESS NOTES
Hospital Medicine Daily Progress Note      Chief Complaint  Hypertension  Diabetes    Interval Problem Update  Pt happy about her BS doing ok on oral meds.    Review of Systems  Review of Systems   Constitutional:  Negative for fever.   Eyes:  Negative for blurred vision.   Respiratory:  Negative for cough.    Cardiovascular:  Negative for chest pain.   Gastrointestinal:  Negative for diarrhea.   Musculoskeletal:  Negative for joint pain.   Neurological:  Negative for dizziness.   Psychiatric/Behavioral:  The patient is not nervous/anxious.         Physical Exam  Temp:  [36.6 °C (97.8 °F)-36.8 °C (98.3 °F)] 36.6 °C (97.8 °F)  Pulse:  [76-93] 80  Resp:  [18] 18  BP: (134-152)/(64-75) 141/72  SpO2:  [91 %-95 %] 91 %    Physical Exam  Vitals and nursing note reviewed.   Constitutional:       Appearance: She is not diaphoretic.   HENT:      Mouth/Throat:      Pharynx: No posterior oropharyngeal erythema.   Eyes:      Extraocular Movements: Extraocular movements intact.   Neck:      Vascular: No carotid bruit.   Cardiovascular:      Rate and Rhythm: Normal rate and regular rhythm.      Heart sounds: No murmur heard.  Pulmonary:      Effort: Pulmonary effort is normal.      Breath sounds: Normal breath sounds. No stridor.   Abdominal:      General: Bowel sounds are normal.      Palpations: Abdomen is soft.   Musculoskeletal:      Right lower leg: No edema.      Left lower leg: No edema.   Skin:     General: Skin is warm and dry.      Findings: No rash.   Neurological:      Mental Status: She is alert and oriented to person, place, and time.   Psychiatric:         Mood and Affect: Mood normal.         Behavior: Behavior normal.         Fluids    Intake/Output Summary (Last 24 hours) at 7/13/2023 0915  Last data filed at 7/13/2023 0825  Gross per 24 hour   Intake 580 ml   Output --   Net 580 ml         Laboratory                            Assessment/Plan  * Ischemic stroke (HCC)- (present on admission)  Assessment &  Plan  Cont ASA, Plavix  Cont Lipitor  Has ZioPatch    Hypertension  Assessment & Plan  BP ok  Cont Lisinopril  Cont to monitor    Type 2 diabetes mellitus without complication, without long-term current use of insulin (HCC)- (present on admission)  Assessment & Plan  Hba1c: 12.6 (7/2)   (am) --> 118 (noon)  Cont Metformin: 850 mg bid --> 1000 mg bid (7/13 am)  Off Glargine: 18 units qam (last dose 7/12)  Note: home meds include Metformin & Glipizide 2.5 mg qam           has not taken her meds for > 8 years because 2nd to not getting regular medical care  Will try to get back on oral meds before discharge  Cont to monitor    Tobacco abuse- (present on admission)  Assessment & Plan  On Dulera  RT protocol

## 2023-07-13 NOTE — THERAPY
Physical Therapy   Daily Treatment     Patient Name: Nyasia Schuster  Age:  67 y.o., Sex:  female  Medical Record #: [id number]  Today's Date: 7/13/2023     Precautions  Precautions: Fall Risk  Comments: zio patch, RUE sling to prevent subluxation    Subjective    Pt seated in w/c upon arrival, agreeable to both sessions.     Objective       07/13/23 0831 07/13/23 1031   PT Charge Group   PT Gait Training (Units) 2  --    PT Therapeutic Exercise (Units) 1  --    PT Neuromuscular Re-Education / Balance (Units)  --  1   PT Therapeutic Activities (Units) 1 1   Supervising Physical Therapist Annie Skelton   PT Total Time Spent   PT Individual Total Time Spent (Mins) 60 30   Pain 0 - 10 Group   Location Shoulder  --    Therapist Pain Assessment Post Activity Pain Same as Prior to Activity  --    Cognition    Level of Consciousness  --  Alert   Gait Functional Level of Assist    Gait Level Of Assist Contact Guard Assist  (to SBA)  --    Assistive Device Quad Cane  (SBQC, R standard sling)  --    Distance (Feet)   (100x2+120(outdoor))  --    # of Times Distance was Traveled 3  --    Deviation Decreased Base Of Support;Bradykinetic;Decreased Heel Strike;Decreased Toe Off  --    Transfer Functional Level of Assist   Bed, Chair, Wheelchair Transfer Contact Guard Assist  (to SBA)  --    Bed Chair Wheelchair Transfer Description Adaptive equipment;Initial preparation for task;Supervision for safety;Verbal cueing  (SBQC)  --    Toilet Transfers Standby Assist  --    Toilet Transfer Description Adaptive equipment;Grab bar;Supervision for safety  (SBQC)  --    Supine Lower Body Exercise   Comments BLE leg press, heel raises and L/Rsingle leg press 2x15 each w/ 4 bands resistance focusing on knee control  --    Bed Mobility    Supine to Sit Contact Guard Assist  (shuttle)  --    Sit to Supine Standby Assist  (shuttle)  --    Sit to Stand Supervised  --    Scooting Supervised  --    Neuro-Muscular Treatments    Neuro-Muscular Treatments  --  Anterior weight shift;Facilitation;Postural Changes;Postural Facilitation   Comments  --  static standing in // bars w/o UE support w/ SBA. 360 degrees turning both direction. trunk turning to L/R x3. static standing on airex w/ intermittent UE support. stepping up airex pad w/o UE support x10. completed above tasks w/ CGA/SBA w/o sign of LOB   Interdisciplinary Plan of Care Collaboration   IDT Collaboration with    --    Patient Position at End of Therapy Seated;Call Light within Reach  (on toilet) Seated;Call Light within Reach;Tray Table within Reach;Phone within Reach   Collaboration Comments re: DME  --      Verbally went through stairs sequencing w/ LHR only. Pt prefer reciprocal pattern for ascending, step to descending. Initially PTA recommend sidestepping up for RUE support on LHR as well. Pt will have family support after d/c, will discuss during FT tomorrow.    Assessment    Pt tolerated both sessions well, still required extra time for sequencing but is progressing in ambulation distance and require less assistance during mobility. FT is schedule for tomorrow and pt is looking forward to be d/c Monday.    Strengths: Able to follow instructions, Alert and oriented, Good insight into deficits/needs, Independent prior level of function, Manages pain appropriately, Motivated for self care and independence, Pleasant and cooperative, Supportive family, Willingly participates in therapeutic activities  Barriers: Decreased endurance, Fatigue, Hemiparesis, Home accessibility, Impaired activity tolerance, Impaired balance, Limited mobility    Plan    FT tomorrow- ambulation w/ SBQC, R sling; stairs; car transfer.    Passport items to be completed:  Get in/out of bed safely, in/out of a vehicle, safely use mobility device, walk or wheel around home/community, navigate up and down stairs, show how to get up/down from the ground, ensure home is accessible, demonstrate HEP,  complete caregiver training    Physical Therapy Problems (Active)       Problem: Balance       Dates: Start:  07/06/23         Goal: STG-Within one week, patient will tolerate [place] Balance Scale assessment.       Dates: Start:  07/06/23               Problem: Mobility       Dates: Start:  07/06/23         Goal: STG-Within one week, patient will ambulate up/down flight of stairs with LUE support and SBA-CGA.       Dates: Start:  07/06/23               Problem: Mobility Transfers       Dates: Start:  07/06/23         Goal: STG-Within one week, patient will perform bed mobility independently.       Dates: Start:  07/06/23               Problem: PT-Long Term Goals       Dates: Start:  07/06/23         Goal: LTG-By discharge, patient will ambulate household distances mod I with LRAD, community distances supervised with LRAD.       Dates: Start:  07/06/23            Goal: LTG-By discharge, patient will transfer one surface to another with LRAD mod I.       Dates: Start:  07/06/23            Goal: LTG-By discharge, patient will ambulate up/down flight of stairs with LUE support and supervision.       Dates: Start:  07/06/23            Goal: LTG-By discharge, patient will transfer in/out of a car with LRAD and supervision.       Dates: Start:  07/06/23

## 2023-07-13 NOTE — CARE PLAN
Problem: Fall Risk - Rehab  Goal: Patient will remain free from falls  Note: Patient remains free from falls this shift. Patient was educated on using the call light to prevent falls. Patients bed is in the lowest position. The patients belongings are placed in near proximity to the patient.       Problem: Pain - Standard  Goal: Alleviation of pain or a reduction in pain to the patient’s comfort goal  Flowsheets (Taken 7/13/2023 0800)  Pain Rating Scale (NPRS): 0   The patient is Stable - Low risk of patient condition declining or worsening

## 2023-07-14 ENCOUNTER — APPOINTMENT (OUTPATIENT)
Dept: PHYSICAL THERAPY | Facility: REHABILITATION | Age: 68
DRG: 056 | End: 2023-07-14
Attending: PHYSICAL MEDICINE & REHABILITATION
Payer: MEDICARE

## 2023-07-14 ENCOUNTER — APPOINTMENT (OUTPATIENT)
Dept: OCCUPATIONAL THERAPY | Facility: REHABILITATION | Age: 68
DRG: 056 | End: 2023-07-14
Attending: PHYSICAL MEDICINE & REHABILITATION
Payer: MEDICARE

## 2023-07-14 LAB
GLUCOSE BLD STRIP.AUTO-MCNC: 116 MG/DL (ref 65–99)
GLUCOSE BLD STRIP.AUTO-MCNC: 139 MG/DL (ref 65–99)
GLUCOSE BLD STRIP.AUTO-MCNC: 159 MG/DL (ref 65–99)
GLUCOSE BLD STRIP.AUTO-MCNC: 170 MG/DL (ref 65–99)

## 2023-07-14 PROCEDURE — 99232 SBSQ HOSP IP/OBS MODERATE 35: CPT | Performed by: HOSPITALIST

## 2023-07-14 PROCEDURE — 97530 THERAPEUTIC ACTIVITIES: CPT | Mod: CQ

## 2023-07-14 PROCEDURE — 99232 SBSQ HOSP IP/OBS MODERATE 35: CPT | Performed by: PHYSICAL MEDICINE & REHABILITATION

## 2023-07-14 PROCEDURE — 97110 THERAPEUTIC EXERCISES: CPT

## 2023-07-14 PROCEDURE — 97112 NEUROMUSCULAR REEDUCATION: CPT

## 2023-07-14 PROCEDURE — 82962 GLUCOSE BLOOD TEST: CPT | Mod: 91

## 2023-07-14 PROCEDURE — 97535 SELF CARE MNGMENT TRAINING: CPT

## 2023-07-14 PROCEDURE — 700102 HCHG RX REV CODE 250 W/ 637 OVERRIDE(OP): Performed by: PHYSICAL MEDICINE & REHABILITATION

## 2023-07-14 PROCEDURE — 97116 GAIT TRAINING THERAPY: CPT | Mod: CQ

## 2023-07-14 PROCEDURE — A9270 NON-COVERED ITEM OR SERVICE: HCPCS | Performed by: PHYSICAL MEDICINE & REHABILITATION

## 2023-07-14 PROCEDURE — A9270 NON-COVERED ITEM OR SERVICE: HCPCS | Performed by: HOSPITALIST

## 2023-07-14 PROCEDURE — 700102 HCHG RX REV CODE 250 W/ 637 OVERRIDE(OP): Performed by: HOSPITALIST

## 2023-07-14 PROCEDURE — 94640 AIRWAY INHALATION TREATMENT: CPT

## 2023-07-14 PROCEDURE — 770010 HCHG ROOM/CARE - REHAB SEMI PRIVAT*

## 2023-07-14 PROCEDURE — 97530 THERAPEUTIC ACTIVITIES: CPT

## 2023-07-14 RX ADMIN — LORATADINE 10 MG: 10 TABLET ORAL at 08:00

## 2023-07-14 RX ADMIN — INSULIN LISPRO 2 UNITS: 100 INJECTION, SOLUTION INTRAVENOUS; SUBCUTANEOUS at 21:19

## 2023-07-14 RX ADMIN — MOMETASONE FUROATE AND FORMOTEROL FUMARATE DIHYDRATE 2 PUFF: 200; 5 AEROSOL RESPIRATORY (INHALATION) at 21:15

## 2023-07-14 RX ADMIN — INSULIN LISPRO 2 UNITS: 100 INJECTION, SOLUTION INTRAVENOUS; SUBCUTANEOUS at 07:56

## 2023-07-14 RX ADMIN — METFORMIN HYDROCHLORIDE 1000 MG: 500 TABLET ORAL at 17:18

## 2023-07-14 RX ADMIN — CLOPIDOGREL BISULFATE 75 MG: 75 TABLET ORAL at 07:58

## 2023-07-14 RX ADMIN — NICOTINE TRANSDERMAL SYSTEM 21 MG: 21 PATCH, EXTENDED RELEASE TRANSDERMAL at 05:40

## 2023-07-14 RX ADMIN — ATORVASTATIN CALCIUM 40 MG: 40 TABLET, FILM COATED ORAL at 21:21

## 2023-07-14 RX ADMIN — LISINOPRIL 5 MG: 5 TABLET ORAL at 05:40

## 2023-07-14 RX ADMIN — MOMETASONE FUROATE AND FORMOTEROL FUMARATE DIHYDRATE 2 PUFF: 200; 5 AEROSOL RESPIRATORY (INHALATION) at 08:19

## 2023-07-14 RX ADMIN — METFORMIN HYDROCHLORIDE 1000 MG: 500 TABLET ORAL at 07:58

## 2023-07-14 ASSESSMENT — ENCOUNTER SYMPTOMS
DIARRHEA: 0
NAUSEA: 0
SHORTNESS OF BREATH: 0
ABDOMINAL PAIN: 0
CHILLS: 0
VOMITING: 0
NERVOUS/ANXIOUS: 0
FEVER: 0

## 2023-07-14 ASSESSMENT — GAIT ASSESSMENTS
DEVIATION: DECREASED BASE OF SUPPORT;DECREASED HEEL STRIKE;DECREASED TOE OFF
GAIT LEVEL OF ASSIST: CONTACT GUARD ASSIST
ASSISTIVE DEVICE: QUAD CANE
DEVIATION: DECREASED BASE OF SUPPORT;DECREASED HEEL STRIKE;DECREASED TOE OFF
DISTANCE (FEET): 110
ASSISTIVE DEVICE: OTHER (COMMENTS)
GAIT LEVEL OF ASSIST: CONTACT GUARD ASSIST

## 2023-07-14 ASSESSMENT — ACTIVITIES OF DAILY LIVING (ADL)
BED_CHAIR_WHEELCHAIR_TRANSFER_DESCRIPTION: SUPERVISION FOR SAFETY
BED_CHAIR_WHEELCHAIR_TRANSFER_DESCRIPTION: ADAPTIVE EQUIPMENT;INCREASED TIME;SUPERVISION FOR SAFETY

## 2023-07-14 NOTE — THERAPY
"Occupational Therapy  Daily Treatment     Patient Name: Nyasia Schuster  Age:  67 y.o., Sex:  female  Medical Record #: [id number]  Today's Date: 7/14/2023     Precautions  Precautions: Fall Risk  Comments: zio patch, RUE sling to prevent subluxation         Subjective    \"That makes sense\" pt reported about the mirror therapy      Objective       07/14/23 1231   OT Charge Group   OT Neuromuscular Re-education / Balance (Units) 2   OT Therapy Activity (Units) 2   OT Total Time Spent   OT Individual Total Time Spent (Mins) 60   Cognition    Level of Consciousness Alert   Neuro-Muscular Treatments   Neuro-Muscular Treatments Biofeedback;Verbal Cuing  (see note for details)   Interdisciplinary Plan of Care Collaboration   IDT Collaboration with  Family / Caregiver   Patient Position at End of Therapy Seated;Chair Alarm On;Self Releasing Lap Belt Applied;Call Light within Reach;Tray Table within Reach;Phone within Reach;Family / Friend in Room   Collaboration Comments sister present during start of session     Neuro Re-ed:  Mirror therapy: educated pt on reasoning behind it and pt trialed mirror therapy- handout issued to pt and mirror box left in pts room to continue. Pt reported having a mirror that she can use at home    - educated pt on guided imagery for RUE     - educated pt on the importance of using RUE for functional tasks and having L hand guide R hand as needed     - pt demonstrated holding spoon with R hand with assist from L hand and simulated scooping food and then bringing to mouth.       Assessment    Pt tolerated session well. Increased time taken to discuss d/c planning. Pt reported making significant gains since being in rehab and feeling good about d/c in 3 days. Pt reported having good support from family. Pt limited by R heather and previous R rotator cuff injury with limited mobility at shoulder previously.      Strengths: Able to follow instructions, Alert and oriented, Effective communication " skills, Good carryover of learning, Independent prior level of function, Good insight into deficits/needs, Making steady progress towards goals, Motivated for self care and independence, Pleasant and cooperative, Supportive family, Willingly participates in therapeutic activities  Barriers: Bowel incontinence, Decreased endurance, Fatigue, Generalized weakness, Hemiplegia, Home accessibility, Impaired activity tolerance, Impaired balance, Limited mobility    Plan    RUE neuro re-ed - cont FMC, R shoulder pain - use sling as needed    Occupational Therapy Goals (Active)       Problem: Bathing       Dates: Start:  07/06/23         Goal: STG-Within one week, patient will bathe at SBA using DME/AE PRN.       Dates: Start:  07/06/23         Goal Note filed on 07/10/23 1142 by Yajaira Pickard, MS,OTR/L       Requires CGA                 Problem: Eating       Dates: Start:  07/06/23         Goal: STG-Within one week, patient will feed self at Mod I level using AE PRN.       Dates: Start:  07/06/23         Goal Note filed on 07/10/23 1142 by Yajaira Pickard, MS,OTR/L       Requires set-up assist                 Problem: Functional Transfers       Dates: Start:  07/06/23         Goal: STG-Within one week, patient will transfer to toilet at SBA level using DME PRN.       Dates: Start:  07/06/23         Goal Note filed on 07/10/23 1142 by Yajaira Pickard, MS,OTR/L       Requires CGA with use quad cane                 Problem: OT Long Term Goals       Dates: Start:  07/06/23         Goal: LTG-By discharge, patient will complete basic self care tasks at SBA-Mod I level using DME/AE PRN.       Dates: Start:  07/06/23            Goal: LTG-By discharge, patient will perform bathroom transfers at SPV-Mod I level using DME/AE PRN.       Dates: Start:  07/06/23            Goal: LTG-By discharge, patient will complete basic home management at Min A-Mod I level using DME/AE PRN.       Dates: Start:  07/06/23                Problem: Toileting       Dates: Start:  07/06/23         Goal: STG-Within one week, patient will complete toileting tasks at CGA level using DME PRN.       Dates: Start:  07/06/23         Goal Note filed on 07/10/23 1142 by Yajaira Pickard MS,OTR/L       Requires Min A

## 2023-07-14 NOTE — THERAPY
Physical Therapy   Daily Treatment     Patient Name: Nyasia Schuster  Age:  67 y.o., Sex:  female  Medical Record #: [id number]  Today's Date: 7/14/2023     Precautions  Precautions: Fall Risk  Comments: zio patch, RUE sling to prevent subluxation    Subjective    Pt seated in w/c upon arrival, sister was suppose to be here for FT but was being held up from work.     Objective       07/14/23 0901   PT Charge Group   PT Gait Training (Units) 1   PT Therapeutic Activities (Units) 1   Supervising Physical Therapist Annie Skelton   PT Total Time Spent   PT Individual Total Time Spent (Mins) 30   Cognition    Level of Consciousness Alert   Gait Functional Level of Assist    Gait Level Of Assist Contact Guard Assist   Assistive Device Other (Comments)  (no AD, gait belt only)   Distance (Feet)   (150+50 indoor+outdoor)   # of Times Distance was Traveled 2   Deviation Decreased Base Of Support;Decreased Heel Strike;Decreased Toe Off   Transfer Functional Level of Assist   Bed, Chair, Wheelchair Transfer Contact Guard Assist   Bed Chair Wheelchair Transfer Description Supervision for safety  (no AD)   Bed Mobility    Sit to Stand Supervised   Interdisciplinary Plan of Care Collaboration   Patient Position at End of Therapy Seated;Call Light within Reach;Tray Table within Reach;Phone within Reach     Delivered fall packet, completed floor recovery w/ CGA and verbal cues.    Assessment    Pt tolerated session well, was able to trial gait w/o AD w/ CGA, 1 minor LOB laterally but was able to recover w/ CGA. R shoulder was not bothering her today and she completed session w/o R sling.    Strengths: Able to follow instructions, Alert and oriented, Good insight into deficits/needs, Independent prior level of function, Manages pain appropriately, Motivated for self care and independence, Pleasant and cooperative, Supportive family, Willingly participates in therapeutic activities  Barriers: Decreased endurance, Fatigue, Hemiparesis,  Home accessibility, Impaired activity tolerance, Impaired balance, Limited mobility    Plan    FT- ambulation w/ SBQC, R sling; stairs; car transfer.     Passport items to be completed:  in/out of a vehicle, complete caregiver training    Physical Therapy Problems (Active)       Problem: Balance       Dates: Start:  07/06/23         Goal: STG-Within one week, patient will tolerate [place] Balance Scale assessment.       Dates: Start:  07/06/23               Problem: Mobility       Dates: Start:  07/06/23         Goal: STG-Within one week, patient will ambulate up/down flight of stairs with LUE support and SBA-CGA.       Dates: Start:  07/06/23               Problem: Mobility Transfers       Dates: Start:  07/06/23         Goal: STG-Within one week, patient will perform bed mobility independently.       Dates: Start:  07/06/23               Problem: PT-Long Term Goals       Dates: Start:  07/06/23         Goal: LTG-By discharge, patient will ambulate household distances mod I with LRAD, community distances supervised with LRAD.       Dates: Start:  07/06/23            Goal: LTG-By discharge, patient will transfer one surface to another with LRAD mod I.       Dates: Start:  07/06/23            Goal: LTG-By discharge, patient will ambulate up/down flight of stairs with LUE support and supervision.       Dates: Start:  07/06/23            Goal: LTG-By discharge, patient will transfer in/out of a car with LRAD and supervision.       Dates: Start:  07/06/23

## 2023-07-14 NOTE — CARE PLAN
The patient is Stable - Low risk of patient condition declining or worsening    Problem: Fall Risk - Rehab  Goal: Patient will remain free from falls  Outcome: Progressing  Note: Deanne Cheng Fall risk Assessment Score: 8  Low fall risk interventions   - Call light within reach   - Yellow  socks   - Belongings within reach   - Bed in the lowest position   Patient demonstrates good safety technique this shift.  Asks for assistance when needed and does not attempt self transfer.  Able to verbalize needs.       Problem: Bladder / Voiding  Goal: Patient will establish and maintain regular urinary output  Outcome: Progressing  Note: Patient is continent of bladder this shift.  Will continue to monitor. Patient voiding adequate amounts of clear, yellow urine. Denies dysuria and flank pain: afebrile.

## 2023-07-14 NOTE — CARE PLAN
The patient is Stable - Low risk of patient condition declining or worsening    Shift Goals  Clinical Goals: safety  Patient Goals: sleep    Progress made toward(s) clinical / shift goals:    Problem: Fall Risk - Rehab  Goal: Patient will remain free from falls  Outcome: Progressing. Patient bed in lowest locked position with bed alarm on and call light within reach. Patient calls appropriately with call light.      Problem: Pain - Standard  Goal: Alleviation of pain or a reduction in pain to the patient’s comfort goal  Outcome: Progressing. Patient denies pain over shift, has tylenol available as needed.

## 2023-07-14 NOTE — PROGRESS NOTES
Hospital Medicine Daily Progress Note      Chief Complaint  Hypertension  Diabetes    Interval Problem Update  No complaints.  Doing ok.    Review of Systems  Review of Systems   Constitutional:  Negative for chills and fever.   Respiratory:  Negative for shortness of breath.    Cardiovascular:  Negative for chest pain.   Gastrointestinal:  Negative for abdominal pain, diarrhea, nausea and vomiting.   Psychiatric/Behavioral:  The patient is not nervous/anxious.         Physical Exam  Temp:  [36.7 °C (98.1 °F)-37.1 °C (98.7 °F)] 37.1 °C (98.7 °F)  Pulse:  [85-94] 87  Resp:  [17-18] 17  BP: (116-139)/(70-73) 126/73  SpO2:  [92 %-96 %] 96 %    Physical Exam  Vitals and nursing note reviewed.   Constitutional:       Appearance: Normal appearance.   HENT:      Head: Atraumatic.   Eyes:      Conjunctiva/sclera: Conjunctivae normal.      Pupils: Pupils are equal, round, and reactive to light.   Cardiovascular:      Rate and Rhythm: Normal rate and regular rhythm.   Pulmonary:      Effort: Pulmonary effort is normal.      Breath sounds: Normal breath sounds.   Abdominal:      General: Bowel sounds are normal.      Palpations: Abdomen is soft.   Musculoskeletal:      Cervical back: Normal range of motion and neck supple.      Right lower leg: No edema.      Left lower leg: No edema.   Skin:     General: Skin is warm and dry.   Neurological:      Mental Status: She is alert and oriented to person, place, and time.   Psychiatric:         Mood and Affect: Mood normal.         Behavior: Behavior normal.         Fluids    Intake/Output Summary (Last 24 hours) at 7/14/2023 0942  Last data filed at 7/13/2023 1240  Gross per 24 hour   Intake 480 ml   Output --   Net 480 ml         Laboratory                            Assessment/Plan  * Ischemic stroke (HCC)- (present on admission)  Assessment & Plan  Cont ASA, Plavix  Cont Lipitor  Has ZioPatch    Hypertension  Assessment & Plan  BP ok  Cont Lisinopril  Cont to monitor    Type 2  diabetes mellitus without complication, without long-term current use of insulin (HCC)- (present on admission)  Assessment & Plan  Hba1c: 12.6 (7/2)  BS recently: 118-159  Cont Metformin: 850 mg bid --> 1000 mg bid (7/13 am)  Off Glargine: 18 units qam (last dose 7/12)  Note: home meds include Metformin & Glipizide 2.5 mg qam           has not taken her meds for > 8 years because 2nd to not getting regular medical care  Will try to get back on oral meds before discharge  Cont to monitor    Tobacco abuse- (present on admission)  Assessment & Plan  On Dulera  RT protocol

## 2023-07-14 NOTE — PROGRESS NOTES
NURSING DAILY NOTE    Name: Nyasia Schuster  Date of Admission: 7/5/2023  Admitting Diagnosis: Ischemic stroke (HCC)  Attending Physician: Unique Black M.d.  Allergies: Patient has no known allergies.    Safety  Patient Assist  ocga  Patient Precautions  oFall Risk  Precaution Comments  ozio patch, RUE sling to prevent subluxation  Bed Transfer Status  oContact Guard Assist (to SBA)  Toilet Transfer Status  oStandby Assist  Assistive Devices  oCane - quad  Oxygen  oNone - Room Air  Diet/Therapeutic Dining  o  Current Diet Order   Procedures    Diet Order Diet: Level 7 - Easy to Chew (meds whole with thins, Please add a T- rocker knife to all plates.); Liquid level: Level 0 - Thin; Second Modifier: (optional): Consistent CHO (Diabetic)     Pill Administration  owhole  Agitated Behavioral Scale  o14  ABS Level of Severity  Lily Agitation    Fall Risk  Has the patient had a fall this admission?  Lily  Deanne Cheng Fall Risk Scoring  o12, MODERATE RISK  Fall Risk Safety Measures  jessie alarm and chair alarm    Vitals  Temperature: 36.7 °C (98.1 °F)  Temp src: Oral  Pulse: 94  Respiration: 17  Blood Pressure : 126/70  Blood Pressure MAP (Calculated): 89 MM HG  BP Location: Left, Upper Arm  Patient BP Position: Sitting    Oxygen  Pulse Oximetry: 95 %  O2 (LPM): 0  O2 Delivery Device: None - Room Air  Incentive Spirometer Volume: 1500 mL    Bowel and Bladder  Last Bowel Movement  o07/13/23  Stool Type  oNot observed  Bowel Device  oBathroom  Continent  oBladder: Continent void  oBowel: Continent movement  Bladder Function  oUrine Void (mL):  (MOderate)  Number of Times Voided: 1  Urinary Options: Yes  Urine Color: Yellow  Number of Times Incontinent of Urine: 0  Genitourinary Assessment  oBladder Assessment (WDL):  WDL Except  Bhatt Catheter: Not Applicable  Urine Color: Yellow  Number of Bladder Accidents: 0  Total Number of Bladder of Accidents in Last 7 Days: 0  Number of Times Incontinent of Urine:  0  Bladder Device: Bathroom  Bladder Scan: Post Void  $ Bladder Scan Results (mL): 82    Skin  Parveen Score  o 18  Sensory Interventions  o Bed Types: Standard/Trauma Mattress  Skin Preventative Measures: Pillows in Use for Support / Positioning  Moisture Interventions  o       Pain  Pain Rating Scale  o3 - Sometimes distracts me  Pain Location  oShoulder  Pain Location Orientation  oRight, Posterior  Pain Interventions  oMedication (see MAR)    ADLs    Bathing  oShower, Staff (OT)  Linen Change  o   Personal Hygiene  oPerineal Care (HANDWASHED)  Chlorhexidine Bath  o   Oral Care  oBrushed Teeth  Teeth/Dentures  o   Shave  o   Nutrition Percentage Eaten  oDinner, Between % Consumed  Environmental Precautions  oTreaded Slipper Socks on Patient, Bed in Low Position  Patient Turns/Positioning  oPatient Turns Self from Side to Side  Patient Turns Assistance/Tolerance  oStandby Assist  Bed Positions  Antony Controls On, Bed Locked  Head of Bed Elevated  oSelf regulated      Psychosocial/Neurologic Assessment  Psychosocial Assessment  oPsychosocial (WDL):  Within Defined Limits  Neurologic Assessment  oNeuro (WDL): Exceptions to WDL  Level of Consciousness: Alert  Orientation Level: Oriented X4  Cognition: Follows commands, Appropriate attention/concentration, Appropriate safety awareness, Appropriate judgement  Speech: Clear  Pupil Assesment: Yes  R Pupil Size (mm): 3  R Pupil Shape / Description: Round  R Pupil Reaction: Brisk  L Pupil Size (mm): 3  L Pupil Shape / Description: Round  L Pupil Reaction: Brisk  Motor Function/Sensation Assessment: Motor strength  RUE Sensation: Full sensation  Muscle Strength Right Arm: Fair Strength against Gravity but No Resistance  LUE Sensation: Full sensation  Muscle Strength Left Arm: Good Strength Against Gravity and Moderate Resistance  RLE Sensation: Full sensation  Muscle Strength Right Leg: Fair Strength against Gravity but No Resistance  LLE Sensation: Full  sensation  Muscle Strength Left Leg: Good Strength Against Gravity and Moderate Resistance  oEENT (WDL):  WDL Except    Cardio/Pulmonary Assessment  Edema  o   Respiratory Breath Sounds  oRUL Breath Sounds: Diminished  RML Breath Sounds: Diminished  RLL Breath Sounds: Diminished  OPAL Breath Sounds: Diminished  LLL Breath Sounds: Diminished  Cardiac Assessment  oCardiac (WDL):  WDL Except

## 2023-07-14 NOTE — DISCHARGE PLANNING
Case management  I met with pt confirming dc for Monday 7/17; she states her sister will provide transportation home; referral sent to [facility]; quad cane delivered to pt; all [facility] locations are not accepting new patients;  appt.scheduled with new PCP  Friday 7/28 @ 9:25am  with Phan Joseph MD/ [facility] LOC[facility].

## 2023-07-14 NOTE — THERAPY
Occupational Therapy  Daily Treatment     Patient Name: Nyasia Schuster  Age:  67 y.o., Sex:  female  Medical Record #: [id number]  Today's Date: 7/14/2023     Precautions  Precautions: Fall Risk  Comments: zio patch, RUE sling to prevent subluxation         Subjective    Pt seated in w/c eating breakfast upon arrival, pleasant and cooperative, agreeable to therapy       Objective       07/14/23 0831   OT Charge Group   OT Self Care / ADL (Units) 1   OT Neuromuscular Re-education / Balance (Units) 1   OT Total Time Spent   OT Individual Total Time Spent (Mins) 30   Functional Level of Assist   Grooming Supervision;Standing  (wash hands at sink)   Toileting Supervision   Toilet Transfers Standby Assist  (ambulate bed<>toilet with SPC)   Tub / Shower Transfers Standby Assist  (tub shower transfer, step in/out with and without GB at SPC level)   Neuro-Muscular Treatments   Comments WB through BUE on forearms and hands extended in standing. modified push-ups in standing facing mat x 5   Interdisciplinary Plan of Care Collaboration   Patient Position at End of Therapy Seated;Call Light within Reach;Tray Table within Reach;Self Releasing Lap Belt Applied     Functional mobility at SPC: room<>main gym with SBA    Assessment    Pt cont to make steady progress with functional mobility at SPC level and bathroom transfers at SBA    Strengths: Able to follow instructions, Alert and oriented, Effective communication skills, Good carryover of learning, Independent prior level of function, Good insight into deficits/needs, Making steady progress towards goals, Motivated for self care and independence, Pleasant and cooperative, Supportive family, Willingly participates in therapeutic activities  Barriers: Bowel incontinence, Decreased endurance, Fatigue, Generalized weakness, Hemiplegia, Home accessibility, Impaired activity tolerance, Impaired balance, Limited mobility    Plan    TESSA neuro re-ed - cont FMC, R shoulder pain - use  sling as needed    Occupational Therapy Goals (Active)       Problem: Bathing       Dates: Start:  07/06/23         Goal: STG-Within one week, patient will bathe at SBA using DME/AE PRN.       Dates: Start:  07/06/23         Goal Note filed on 07/10/23 1142 by Yajaira Pickard MS,OTR/L       Requires CGA                 Problem: Eating       Dates: Start:  07/06/23         Goal: STG-Within one week, patient will feed self at Mod I level using AE PRN.       Dates: Start:  07/06/23         Goal Note filed on 07/10/23 1142 by Yajaira Pickard MS,OTR/L       Requires set-up assist                 Problem: Functional Transfers       Dates: Start:  07/06/23         Goal: STG-Within one week, patient will transfer to toilet at SBA level using DME PRN.       Dates: Start:  07/06/23         Goal Note filed on 07/10/23 1142 by Yajaira Pickard MS,OTR/L       Requires CGA with use quad cane                 Problem: OT Long Term Goals       Dates: Start:  07/06/23         Goal: LTG-By discharge, patient will complete basic self care tasks at SBA-Mod I level using DME/AE PRN.       Dates: Start:  07/06/23            Goal: LTG-By discharge, patient will perform bathroom transfers at SPV-Mod I level using DME/AE PRN.       Dates: Start:  07/06/23            Goal: LTG-By discharge, patient will complete basic home management at Min A-Mod I level using DME/AE PRN.       Dates: Start:  07/06/23               Problem: Toileting       Dates: Start:  07/06/23         Goal: STG-Within one week, patient will complete toileting tasks at CGA level using DME PRN.       Dates: Start:  07/06/23         Goal Note filed on 07/10/23 1142 by Yajaira Pickard MS,OTR/L       Requires Min A

## 2023-07-14 NOTE — PROGRESS NOTES
Rehab Progress Note     Date of Service: 7/14/2023  Chief Complaint: Follow-up stroke    Interval Events (Subjective)    Patient seen and examined today in her room.  She reports better sleep last night/was moved to another room when she does not remain.  Unfortunately she lost her left front tooth yesterday when she was eating something.  She reports she likely will need dentures at some point.  We discussed her discharge medications and need to have them generic she does not have pharmacy coverage.  She also needs a glucometer and PE.  She reports she is able to walk with physical therapy today without an assistive device and even went outside and walked around the building.    Patient has no other new questions, concerns, or complaints today.           Objective:  VITAL SIGNS: /73   Pulse 87   Temp 37.1 °C (98.7 °F) (Oral)   Resp 17   Ht 1.524 m (5')   Wt 79.8 kg (176 lb)   SpO2 96%   BMI 34.37 kg/m²   Gen: alert, no apparent distress  HEENT: Poor dentition  CV: Regular rate, regular rhythm, cardiac monitor in left upper chest wall  Resp: Clear to auscultation bilaterally  Neuro: Right arm hemiparesis      Recent Results (from the past 72 hour(s))   POCT glucose device results    Collection Time: 07/11/23 11:33 AM   Result Value Ref Range    POC Glucose, Blood 148 (H) 65 - 99 mg/dL   POCT glucose device results    Collection Time: 07/11/23  5:18 PM   Result Value Ref Range    POC Glucose, Blood 118 (H) 65 - 99 mg/dL   POCT glucose device results    Collection Time: 07/11/23  8:38 PM   Result Value Ref Range    POC Glucose, Blood 166 (H) 65 - 99 mg/dL   POCT glucose device results    Collection Time: 07/12/23  7:31 AM   Result Value Ref Range    POC Glucose, Blood 143 (H) 65 - 99 mg/dL   POCT glucose device results    Collection Time: 07/12/23 11:08 AM   Result Value Ref Range    POC Glucose, Blood 101 (H) 65 - 99 mg/dL   POCT glucose device results    Collection Time: 07/12/23  5:09 PM   Result Value  Ref Range    POC Glucose, Blood 108 (H) 65 - 99 mg/dL   POCT glucose device results    Collection Time: 07/12/23  8:08 PM   Result Value Ref Range    POC Glucose, Blood 140 (H) 65 - 99 mg/dL   POCT glucose device results    Collection Time: 07/13/23  7:46 AM   Result Value Ref Range    POC Glucose, Blood 120 (H) 65 - 99 mg/dL   POCT glucose device results    Collection Time: 07/13/23 11:45 AM   Result Value Ref Range    POC Glucose, Blood 118 (H) 65 - 99 mg/dL   POCT glucose device results    Collection Time: 07/13/23  5:07 PM   Result Value Ref Range    POC Glucose, Blood 142 (H) 65 - 99 mg/dL   POCT glucose device results    Collection Time: 07/13/23  8:16 PM   Result Value Ref Range    POC Glucose, Blood 146 (H) 65 - 99 mg/dL   POCT glucose device results    Collection Time: 07/14/23  7:14 AM   Result Value Ref Range    POC Glucose, Blood 159 (H) 65 - 99 mg/dL       Scheduled Medications   Medication Dose Frequency    metFORMIN  1,000 mg BID WITH MEALS    lisinopril  5 mg Q DAY    insulin lispro  2-12 Units 4X/DAY ACHS    atorvastatin  40 mg Q EVENING    clopidogrel  75 mg DAILY    enoxaparin (LOVENOX) injection  40 mg DAILY AT 1800    nicotine  21 mg Daily-0600    senna-docusate  2 Tablet BID    mometasone-formoterol  2 Puff BID (RT)    loratadine  10 mg DAILY       Current Diet Order   Procedures    Diet Order Diet: Level 7 - Easy to Chew (meds whole with thins, Please add a T- rocker knife to all plates.); Liquid level: Level 0 - Thin; Second Modifier: (optional): Consistent CHO (Diabetic)       Radiology    Imaging personally reviewed and interpreted by me.    DX-SHOULDER 2+ RIGHT   Final Result         1. No acute osseous abnormality.          Assessment:    This patient is a 67 y.o. female admitted for acute inpatient rehabilitation with Ischemic stroke (HCC).      I, Unique Black M.D./MD., was present and led the interdisciplinary team conference on 7/10/2023.  I led the IDT conference and agree  with the IDT conference documentation and plan of care as noted below.     Nursing:  Diet Current Diet Order   Procedures    Diet Order Diet: Level 7 - Easy to Chew (meds whole with thins, Please add a T- rocker knife to all plates.); Liquid level: Level 0 - Thin; Second Modifier: (optional): Consistent CHO (Diabetic)       Eating ADL Supervision  Set-up of equipment or meal/tube feeding (See note for self-feeding eval details.)   % of Last Meal  Oral Nutrition: *  * Meal *  *, Dinner, Between % Consumed   Sleep No issues   Bowel Last BM: 07/09/23   Bladder Post Void  82   Barriers to Discharge Home: diabetic education      Physical Therapy:  Bed Mobility    Transfers Contact Guard Assist  Supervision for safety, Adaptive equipment (Stand pivot using quad cane from bed to w/c.)   Mobility Contact Guard Assist   Stairs Minimal Assist (CG/Mc)   Barriers to Discharge Home: stairs at home      Occupational Therapy:  Grooming Standby Assist (Seated in w/c)   Bathing Contact Guard Assist   UB Dressing Stand by Assist   LB Dressing Contact Guard Assist   Toileting Moderate Assist   Shower & Tub Transfer Contact Guard Assist   Barriers to Discharge Home: right arm weakness    Respiratory Therapy:  O2 (LPM): 0  O2 Delivery Device: None - Room Air    Case Management:  Continues to work on disposition and DME needs.      Discharge Date/Disposition:    7/17    HH: PT/OT/RN    Equip: shower bench, small based quad cane    Follow-ups: PCP -patient does not have wounds and needs 1, stroke bridge clinic, Dr. Haines    Rx: [facility] as patient does not have prescription coverage      Problem List/Medical Decision Making and Plan:    Left MCA stroke  Right hemiparesis  PT/OT, 1.5 hr each discipline, 5 days per week    Completed aspirin and Plavix for 10 days    Continue Plavix and atorvastatin    Patient has cardiac monitor in place, to be returned 7/19, after discharge    Outpatient follow-up with stroke [facility] clinic and  Dr. Haines, referrals made    Right shoulder pain, acute on chronic  Exam suggestive of rotator cuff tendinitis/bursitis  X-rays without acute abnormalities  Support hemiplegic arm    Diabetes with hyperglycemia  Continue metformin  Continue sliding scale insulin only as an inpatient  Appreciate hospitalist assistance  Needs glucometer at discharge     Tobacco abuse  Continue nicotine patch and gum  Counseling provided 7/13    Hypertension  Continue lisinopril  Appreciate hospitalist assistance    Upper back pain, resolved  Discontinue Lidoderm patches    Oral thrush, resolved  Completed nystatin     Seasonal allergies  Continue Claritin    Respiratory failure with hypoxia  Likely due to undiagnosed COPD  Continue Dulera  Titrated off oxygen     Abdominal gas, resolved  Completed simethicone    Constipation, resolved  Continue Senokot as needed  Last bowel movement 7/13    DVT prophylaxis  Discontinue Lovenox as patient is walking long distances    Unique Black M.D.  Physical Medicine and Rehabilitation

## 2023-07-14 NOTE — THERAPY
Physical Therapy   Daily Treatment     Patient Name: Nyasia Schuster  Age:  67 y.o., Sex:  female  Medical Record #: [id number]  Today's Date: 7/14/2023     Precautions  Precautions: Fall Risk  Comments: zio patch, RUE sling to prevent subluxation    Subjective    Patient seated in w/c and agreeable to therapy, reporting that her front tooth fell out last night while eating green beans.     Objective       07/14/23 0701   PT Charge Group   PT Gait Training (Units) 1   PT Therapeutic Exercise (Units) 1   PT Neuromuscular Re-Education / Balance (Units) 1   PT Therapeutic Activities (Units) 1   PT Total Time Spent   PT Individual Total Time Spent (Mins) 60   Precautions   Precautions Fall Risk   Comments zio patch, RUE sling to prevent subluxation   Gait Functional Level of Assist    Gait Level Of Assist Contact Guard Assist  (to SBA)   Assistive Device Quad Cane  (SBQC, R standard sling)   Distance (Feet) 110   # of Times Distance was Traveled 1   Deviation Decreased Base Of Support;Decreased Heel Strike;Decreased Toe Off  (increased double leg stance time)   Transfer Functional Level of Assist   Bed, Chair, Wheelchair Transfer Contact Guard Assist  (to SBA)   Bed Chair Wheelchair Transfer Description Adaptive equipment;Increased time;Supervision for safety  (stand step transfer with SBQC vs. no AD)   Bed Mobility    Sit to Stand Supervised   Interdisciplinary Plan of Care Collaboration   IDT Collaboration with  Nursing;Physical Therapist Assistant (PTA)   Patient Position at End of Therapy Seated;Self Releasing Lap Belt Applied;Call Light within Reach;Tray Table within Reach;Phone within Reach   Collaboration Comments CLOF, POC     Gait training as described above as well as 110 ftx1 with SPC and 100 ftx1 with no AD, CGA-SBA. No LOBs or buckling noted.    Seated RUE scapular exercises with pink theraband, multimodal cues for positioning and technique: shoulder ER 2x10, isometric shoulder ER 2x10, scapular row  2x10.    Modified plank on edge of therapy mat with SBA-CGA, performing partial push up to tolerance 2x5. Static plank on forearms on small physioball x20 seconds however discontinued due to anterior R shoulder pain.    BLE deadlift with BUEs holding 5# dumbbell 2x10 with cues for technique.    Assessment    Patient tolerated session well, focus of session on gait training and functional strengthening exercises. Demonstrating good mobility with SPC and no AD, however patient preferring using SBQC at this time. Discussed continuing to progress assistive device with home therapy; patient in agreement.    Strengths: Able to follow instructions, Alert and oriented, Good insight into deficits/needs, Independent prior level of function, Manages pain appropriately, Motivated for self care and independence, Pleasant and cooperative, Supportive family, Willingly participates in therapeutic activities  Barriers: Decreased endurance, Fatigue, Hemiparesis, Home accessibility, Impaired activity tolerance, Impaired balance, Limited mobility    Plan    FT- ambulation w/ SBQC, R sling; stairs; car transfer.     Passport items to be completed:  Get in/out of bed safely, in/out of a vehicle, safely use mobility device, walk or wheel around home/community, navigate up and down stairs, show how to get up/down from the ground, ensure home is accessible, demonstrate HEP, complete caregiver training       Physical Therapy Problems (Active)       Problem: Balance       Dates: Start:  07/06/23         Goal: STG-Within one week, patient will tolerate Delgadillo Balance Scale assessment.       Dates: Start:  07/06/23               Problem: Mobility       Dates: Start:  07/06/23         Goal: STG-Within one week, patient will ambulate up/down flight of stairs with LUE support and SBA-CGA.       Dates: Start:  07/06/23               Problem: Mobility Transfers       Dates: Start:  07/06/23         Goal: STG-Within one week, patient will perform bed  mobility independently.       Dates: Start:  07/06/23               Problem: PT-Long Term Goals       Dates: Start:  07/06/23         Goal: LTG-By discharge, patient will ambulate household distances mod I with LRAD, community distances supervised with LRAD.       Dates: Start:  07/06/23            Goal: LTG-By discharge, patient will transfer one surface to another with LRAD mod I.       Dates: Start:  07/06/23            Goal: LTG-By discharge, patient will ambulate up/down flight of stairs with LUE support and supervision.       Dates: Start:  07/06/23            Goal: LTG-By discharge, patient will transfer in/out of a car with LRAD and supervision.       Dates: Start:  07/06/23

## 2023-07-15 ENCOUNTER — APPOINTMENT (OUTPATIENT)
Dept: PHYSICAL THERAPY | Facility: REHABILITATION | Age: 68
DRG: 056 | End: 2023-07-15
Attending: PHYSICAL MEDICINE & REHABILITATION
Payer: MEDICARE

## 2023-07-15 LAB
ANION GAP SERPL CALC-SCNC: 13 MMOL/L (ref 7–16)
BUN SERPL-MCNC: 12 MG/DL (ref 8–22)
CALCIUM SERPL-MCNC: 9.4 MG/DL (ref 8.5–10.5)
CHLORIDE SERPL-SCNC: 103 MMOL/L (ref 96–112)
CO2 SERPL-SCNC: 23 MMOL/L (ref 20–33)
CREAT SERPL-MCNC: 0.48 MG/DL (ref 0.5–1.4)
GFR SERPLBLD CREATININE-BSD FMLA CKD-EPI: 103 ML/MIN/1.73 M 2
GLUCOSE BLD STRIP.AUTO-MCNC: 110 MG/DL (ref 65–99)
GLUCOSE BLD STRIP.AUTO-MCNC: 143 MG/DL (ref 65–99)
GLUCOSE BLD STRIP.AUTO-MCNC: 153 MG/DL (ref 65–99)
GLUCOSE BLD STRIP.AUTO-MCNC: 153 MG/DL (ref 65–99)
GLUCOSE SERPL-MCNC: 121 MG/DL (ref 65–99)
MAGNESIUM SERPL-MCNC: 1.6 MG/DL (ref 1.5–2.5)
PHOSPHATE SERPL-MCNC: 3.6 MG/DL (ref 2.5–4.5)
POTASSIUM SERPL-SCNC: 4.3 MMOL/L (ref 3.6–5.5)
SODIUM SERPL-SCNC: 139 MMOL/L (ref 135–145)

## 2023-07-15 PROCEDURE — 99232 SBSQ HOSP IP/OBS MODERATE 35: CPT | Performed by: HOSPITALIST

## 2023-07-15 PROCEDURE — 36415 COLL VENOUS BLD VENIPUNCTURE: CPT

## 2023-07-15 PROCEDURE — 97530 THERAPEUTIC ACTIVITIES: CPT

## 2023-07-15 PROCEDURE — 82962 GLUCOSE BLOOD TEST: CPT | Mod: 91

## 2023-07-15 PROCEDURE — 94640 AIRWAY INHALATION TREATMENT: CPT

## 2023-07-15 PROCEDURE — A9270 NON-COVERED ITEM OR SERVICE: HCPCS | Performed by: HOSPITALIST

## 2023-07-15 PROCEDURE — 770010 HCHG ROOM/CARE - REHAB SEMI PRIVAT*

## 2023-07-15 PROCEDURE — 99232 SBSQ HOSP IP/OBS MODERATE 35: CPT | Performed by: PHYSICAL MEDICINE & REHABILITATION

## 2023-07-15 PROCEDURE — 700102 HCHG RX REV CODE 250 W/ 637 OVERRIDE(OP): Performed by: PHYSICAL MEDICINE & REHABILITATION

## 2023-07-15 PROCEDURE — 83735 ASSAY OF MAGNESIUM: CPT

## 2023-07-15 PROCEDURE — 80048 BASIC METABOLIC PNL TOTAL CA: CPT

## 2023-07-15 PROCEDURE — 700102 HCHG RX REV CODE 250 W/ 637 OVERRIDE(OP): Performed by: HOSPITALIST

## 2023-07-15 PROCEDURE — 94760 N-INVAS EAR/PLS OXIMETRY 1: CPT

## 2023-07-15 PROCEDURE — 97116 GAIT TRAINING THERAPY: CPT

## 2023-07-15 PROCEDURE — A9270 NON-COVERED ITEM OR SERVICE: HCPCS | Performed by: PHYSICAL MEDICINE & REHABILITATION

## 2023-07-15 PROCEDURE — 84100 ASSAY OF PHOSPHORUS: CPT

## 2023-07-15 RX ORDER — METFORMIN HYDROCHLORIDE 500 MG/1
500 TABLET, EXTENDED RELEASE ORAL
Status: DISCONTINUED | OUTPATIENT
Start: 2023-07-15 | End: 2023-07-16

## 2023-07-15 RX ORDER — BISACODYL 10 MG
10 SUPPOSITORY, RECTAL RECTAL
Status: DISCONTINUED | OUTPATIENT
Start: 2023-07-15 | End: 2023-07-17 | Stop reason: HOSPADM

## 2023-07-15 RX ORDER — POLYETHYLENE GLYCOL 3350 17 G/17G
1 POWDER, FOR SOLUTION ORAL
Status: DISCONTINUED | OUTPATIENT
Start: 2023-07-15 | End: 2023-07-17 | Stop reason: HOSPADM

## 2023-07-15 RX ORDER — LISINOPRIL 5 MG/1
5 TABLET ORAL ONCE
Status: COMPLETED | OUTPATIENT
Start: 2023-07-15 | End: 2023-07-15

## 2023-07-15 RX ORDER — AMOXICILLIN 250 MG
2 CAPSULE ORAL 2 TIMES DAILY PRN
Status: DISCONTINUED | OUTPATIENT
Start: 2023-07-15 | End: 2023-07-17 | Stop reason: HOSPADM

## 2023-07-15 RX ORDER — LISINOPRIL 5 MG/1
10 TABLET ORAL
Status: DISCONTINUED | OUTPATIENT
Start: 2023-07-16 | End: 2023-07-17 | Stop reason: HOSPADM

## 2023-07-15 RX ADMIN — LORATADINE 10 MG: 10 TABLET ORAL at 08:58

## 2023-07-15 RX ADMIN — METFORMIN HYDROCHLORIDE 1000 MG: 500 TABLET ORAL at 08:58

## 2023-07-15 RX ADMIN — NICOTINE TRANSDERMAL SYSTEM 21 MG: 21 PATCH, EXTENDED RELEASE TRANSDERMAL at 05:07

## 2023-07-15 RX ADMIN — INSULIN LISPRO 2 UNITS: 100 INJECTION, SOLUTION INTRAVENOUS; SUBCUTANEOUS at 17:50

## 2023-07-15 RX ADMIN — METFORMIN HYDROCHLORIDE 500 MG: 500 TABLET, EXTENDED RELEASE ORAL at 17:50

## 2023-07-15 RX ADMIN — ALUMINUM HYDROXIDE, MAGNESIUM HYDROXIDE, AND DIMETHICONE 20 ML: 400; 400; 40 SUSPENSION ORAL at 09:52

## 2023-07-15 RX ADMIN — LISINOPRIL 5 MG: 5 TABLET ORAL at 12:04

## 2023-07-15 RX ADMIN — INSULIN LISPRO 2 UNITS: 100 INJECTION, SOLUTION INTRAVENOUS; SUBCUTANEOUS at 08:05

## 2023-07-15 RX ADMIN — CLOPIDOGREL BISULFATE 75 MG: 75 TABLET ORAL at 08:59

## 2023-07-15 RX ADMIN — MOMETASONE FUROATE AND FORMOTEROL FUMARATE DIHYDRATE 2 PUFF: 200; 5 AEROSOL RESPIRATORY (INHALATION) at 06:34

## 2023-07-15 RX ADMIN — MOMETASONE FUROATE AND FORMOTEROL FUMARATE DIHYDRATE 2 PUFF: 200; 5 AEROSOL RESPIRATORY (INHALATION) at 20:38

## 2023-07-15 RX ADMIN — LISINOPRIL 5 MG: 5 TABLET ORAL at 05:09

## 2023-07-15 RX ADMIN — ATORVASTATIN CALCIUM 40 MG: 40 TABLET, FILM COATED ORAL at 20:38

## 2023-07-15 ASSESSMENT — ACTIVITIES OF DAILY LIVING (ADL): BED_CHAIR_WHEELCHAIR_TRANSFER_DESCRIPTION: SUPERVISION FOR SAFETY;VERBAL CUEING;SET-UP OF EQUIPMENT

## 2023-07-15 ASSESSMENT — ENCOUNTER SYMPTOMS
SHORTNESS OF BREATH: 0
VOMITING: 0
FEVER: 0
DIZZINESS: 0
BLURRED VISION: 0
HEADACHES: 0
NAUSEA: 0
PALPITATIONS: 0
HALLUCINATIONS: 0

## 2023-07-15 ASSESSMENT — GAIT ASSESSMENTS
GAIT LEVEL OF ASSIST: CONTACT GUARD ASSIST
DISTANCE (FEET): 50

## 2023-07-15 ASSESSMENT — PAIN DESCRIPTION - PAIN TYPE: TYPE: ACUTE PAIN

## 2023-07-15 NOTE — THERAPY
Physical Therapy   Daily Treatment     Patient Name: Nyasia Schuster  Age:  67 y.o., Sex:  female  Medical Record #: [id number]  Today's Date: 7/15/2023     Precautions  Precautions: (P) Fall Risk  Comments: zio patch, RUE sling to prevent subluxation    Subjective    Patient feels confident with discharge planned for Monday     Objective       07/15/23 1431   PT Charge Group   PT Gait Training (Units) 1   PT Therapeutic Activities (Units) 1   PT Total Time Spent   PT Individual Total Time Spent (Mins) 30   Precautions   Precautions Fall Risk   Gait Functional Level of Assist    Gait Level Of Assist Contact Guard Assist   Assistive Device None   Distance (Feet) 50   # of Times Distance was Traveled 2   Transfer Functional Level of Assist   Bed, Chair, Wheelchair Transfer Standby Assist  (car transfer x 4 with PT/sister SBA with verbal cues for safety)   Bed Chair Wheelchair Transfer Description Supervision for safety;Verbal cueing;Set-up of equipment   Interdisciplinary Plan of Care Collaboration   IDT Collaboration with  Family / Caregiver   Collaboration Comments sister assist with car transfer, observed ambulation         Assessment    Able to perform SPT into car 4x with PT/sister providing SBA  Able to perform ambulation 50ft x 2 no AD gait belt CGA/SBA with verbal cues to widen JELENA    Strengths: Able to follow instructions, Alert and oriented, Good insight into deficits/needs, Independent prior level of function, Manages pain appropriately, Motivated for self care and independence, Pleasant and cooperative, Supportive family, Willingly participates in therapeutic activities  Barriers: Decreased endurance, Fatigue, Hemiparesis, Home accessibility, Impaired activity tolerance, Impaired balance, Limited mobility    Plan    Prepare for discharge home on Monday    Passport items to be completed:  ALL COMPLETE    Physical Therapy Problems (Active)       Problem: Balance       Dates: Start:  07/06/23         Goal:  STG-Within one week, patient will tolerate Delgadillo Balance Scale assessment.       Dates: Start:  07/06/23               Problem: Mobility       Dates: Start:  07/06/23         Goal: STG-Within one week, patient will ambulate up/down flight of stairs with LUE support and SBA-CGA.       Dates: Start:  07/06/23               Problem: Mobility Transfers       Dates: Start:  07/06/23         Goal: STG-Within one week, patient will perform bed mobility independently.       Dates: Start:  07/06/23               Problem: PT-Long Term Goals       Dates: Start:  07/06/23         Goal: LTG-By discharge, patient will ambulate household distances mod I with LRAD, community distances supervised with LRAD.       Dates: Start:  07/06/23            Goal: LTG-By discharge, patient will transfer one surface to another with LRAD mod I.       Dates: Start:  07/06/23            Goal: LTG-By discharge, patient will ambulate up/down flight of stairs with LUE support and supervision.       Dates: Start:  07/06/23            Goal: LTG-By discharge, patient will transfer in/out of a car with LRAD and supervision.       Dates: Start:  07/06/23

## 2023-07-15 NOTE — PROGRESS NOTES
Rehab Progress Note     Date of Service: 7/15/2023  Chief Complaint: Follow-up stroke    Interval Events (Subjective)    Patient seen and examined today in her room.  Reports continued improvement in her right arm strength.  She has noted some loose stools and is wondering if it is the short acting metformin.  Hospitalist has changed her to long-acting.  She has been on metformin before but it was about 7 years ago.  Patient's sister is present.  Patient is looking forward to her upcoming discharge.    Patient has no other new questions, concerns, or complaints today.     Objective:  VITAL SIGNS: BP (!) 146/71   Pulse 70   Temp 36.7 °C (98.1 °F) (Oral)   Resp 16   Ht 1.524 m (5')   Wt 79.8 kg (176 lb)   SpO2 92%   BMI 34.37 kg/m²   Gen: alert, no apparent distress  CV: Regular rate, regular rhythm  Resp: Clear to auscultation bilaterally  Neuro: Mild right arm/hand hemiparesis      Recent Results (from the past 72 hour(s))   POCT glucose device results    Collection Time: 07/12/23 11:08 AM   Result Value Ref Range    POC Glucose, Blood 101 (H) 65 - 99 mg/dL   POCT glucose device results    Collection Time: 07/12/23  5:09 PM   Result Value Ref Range    POC Glucose, Blood 108 (H) 65 - 99 mg/dL   POCT glucose device results    Collection Time: 07/12/23  8:08 PM   Result Value Ref Range    POC Glucose, Blood 140 (H) 65 - 99 mg/dL   POCT glucose device results    Collection Time: 07/13/23  7:46 AM   Result Value Ref Range    POC Glucose, Blood 120 (H) 65 - 99 mg/dL   POCT glucose device results    Collection Time: 07/13/23 11:45 AM   Result Value Ref Range    POC Glucose, Blood 118 (H) 65 - 99 mg/dL   POCT glucose device results    Collection Time: 07/13/23  5:07 PM   Result Value Ref Range    POC Glucose, Blood 142 (H) 65 - 99 mg/dL   POCT glucose device results    Collection Time: 07/13/23  8:16 PM   Result Value Ref Range    POC Glucose, Blood 146 (H) 65 - 99 mg/dL   POCT glucose device results    Collection  Time: 07/14/23  7:14 AM   Result Value Ref Range    POC Glucose, Blood 159 (H) 65 - 99 mg/dL   POCT glucose device results    Collection Time: 07/14/23 11:10 AM   Result Value Ref Range    POC Glucose, Blood 116 (H) 65 - 99 mg/dL   POCT glucose device results    Collection Time: 07/14/23  4:43 PM   Result Value Ref Range    POC Glucose, Blood 139 (H) 65 - 99 mg/dL   POCT glucose device results    Collection Time: 07/14/23  9:18 PM   Result Value Ref Range    POC Glucose, Blood 170 (H) 65 - 99 mg/dL   Basic Metabolic Panel    Collection Time: 07/15/23  6:16 AM   Result Value Ref Range    Sodium 139 135 - 145 mmol/L    Potassium 4.3 3.6 - 5.5 mmol/L    Chloride 103 96 - 112 mmol/L    Co2 23 20 - 33 mmol/L    Glucose 121 (H) 65 - 99 mg/dL    Bun 12 8 - 22 mg/dL    Creatinine 0.48 (L) 0.50 - 1.40 mg/dL    Calcium 9.4 8.5 - 10.5 mg/dL    Anion Gap 13.0 7.0 - 16.0   MAGNESIUM    Collection Time: 07/15/23  6:16 AM   Result Value Ref Range    Magnesium 1.6 1.5 - 2.5 mg/dL   PHOSPHORUS    Collection Time: 07/15/23  6:16 AM   Result Value Ref Range    Phosphorus 3.6 2.5 - 4.5 mg/dL   ESTIMATED GFR    Collection Time: 07/15/23  6:16 AM   Result Value Ref Range    GFR (CKD-EPI) 103 >60 mL/min/1.73 m 2   POCT glucose device results    Collection Time: 07/15/23  7:41 AM   Result Value Ref Range    POC Glucose, Blood 153 (H) 65 - 99 mg/dL       Scheduled Medications   Medication Dose Frequency    metFORMIN  1,000 mg BID WITH MEALS    lisinopril  5 mg Q DAY    insulin lispro  2-12 Units 4X/DAY ACHS    atorvastatin  40 mg Q EVENING    clopidogrel  75 mg DAILY    nicotine  21 mg Daily-0600    senna-docusate  2 Tablet BID    mometasone-formoterol  2 Puff BID (RT)    loratadine  10 mg DAILY       Current Diet Order   Procedures    Diet Order Diet: Level 7 - Easy to Chew (meds whole with thins, Please add a T- rocker knife to all plates.); Liquid level: Level 0 - Thin; Second Modifier: (optional): Consistent CHO (Diabetic)        Radiology    Imaging personally reviewed and interpreted by me.    DX-SHOULDER 2+ RIGHT   Final Result         1. No acute osseous abnormality.          Assessment:    This patient is a 67 y.o. female admitted for acute inpatient rehabilitation with Ischemic stroke (HCC).      I, Unique Black M.D./MD., was present and led the interdisciplinary team conference on 7/10/2023.  I led the IDT conference and agree with the IDT conference documentation and plan of care as noted below.     Nursing:  Diet Current Diet Order   Procedures    Diet Order Diet: Level 7 - Easy to Chew (meds whole with thins, Please add a T- rocker knife to all plates.); Liquid level: Level 0 - Thin; Second Modifier: (optional): Consistent CHO (Diabetic)       Eating ADL Supervision  Set-up of equipment or meal/tube feeding (See note for self-feeding eval details.)   % of Last Meal  Oral Nutrition: *  * Meal *  *, Dinner, Between % Consumed   Sleep No issues   Bowel Last BM: 07/09/23   Bladder Post Void  82   Barriers to Discharge Home: diabetic education      Physical Therapy:  Bed Mobility    Transfers Contact Guard Assist  Supervision for safety, Adaptive equipment (Stand pivot using quad cane from bed to w/c.)   Mobility Contact Guard Assist   Stairs Minimal Assist (CG/Mc)   Barriers to Discharge Home: stairs at home      Occupational Therapy:  Grooming Standby Assist (Seated in w/c)   Bathing Contact Guard Assist   UB Dressing Stand by Assist   LB Dressing Contact Guard Assist   Toileting Moderate Assist   Shower & Tub Transfer Contact Guard Assist   Barriers to Discharge Home: right arm weakness    Respiratory Therapy:  O2 (LPM): 0  O2 Delivery Device: None - Room Air    Case Management:  Continues to work on disposition and DME needs.      Discharge Date/Disposition:    7/17    HH: PT/OT/RN    Equip: shower bench, small based quad cane    Follow-ups: PCP -patient does not have wounds and needs 1, stroke bridge clinic,  Dr. Haines    Rx: [facility] as patient does not have prescription coverage      Problem List/Medical Decision Making and Plan:    Left MCA stroke  Right hemiparesis  PT/OT, 1.5 hr each discipline, 5 days per week    Completed aspirin and Plavix for 10 days    Continue Plavix and atorvastatin    Patient has cardiac monitor in place, to be returned 7/19, after discharge    Outpatient follow-up with stroke [facility] clinic and Dr. Haines, referrals made    Right shoulder pain, acute on chronic  Exam suggestive of rotator cuff tendinitis/bursitis  X-rays without acute abnormalities  Support hemiplegic arm    Diabetes with hyperglycemia  Metformin short acting changed to long-acting due to diarrhea  Continue sliding scale insulin only as an inpatient  Appreciate hospitalist assistance  Needs glucometer at discharge     Tobacco abuse  Continue nicotine patch and gum  Counseling provided 7/13    Hypertension  Continue lisinopril  Appreciate hospitalist assistance    Upper back pain, resolved  Discontinue Lidoderm patches    Oral thrush, resolved  Completed nystatin     Seasonal allergies  Continue Claritin    Respiratory failure with hypoxia  Likely due to undiagnosed COPD  Continue Dulera  Titrated off oxygen     Abdominal gas, resolved  Completed simethicone    Constipation, resolved  Loose stools, continues  Bowel medications discontinued  Short acting metformin changed to long-acting    DVT prophylaxis  Discontinue Lovenox as patient is walking long distances    Unique Black M.D.  Physical Medicine and Rehabilitation

## 2023-07-15 NOTE — PROGRESS NOTES
NURSING DAILY NOTE    Name: Nyasia Schuster   Date of Admission: 7/5/2023   Admitting Diagnosis: Ischemic stroke (HCC)  Attending Physician: Unique Black M.d.  Allergies: Patient has no known allergies.    Safety  Patient Assist  CGA  Patient Precautions  Fall Risk  Precaution Comments  zio patch, RUE sling to prevent subluxation  Bed Transfer Status  Contact Guard Assist  Toilet Transfer Status   Standby Assist (ambulate bed<>toilet with SPC)  Assistive Devices  Cane - single point  Oxygen  None - Room Air  Diet/Therapeutic Dining  Current Diet Order   Procedures    Diet Order Diet: Level 7 - Easy to Chew (meds whole with thins, Please add a T- rocker knife to all plates.); Liquid level: Level 0 - Thin; Second Modifier: (optional): Consistent CHO (Diabetic)     Pill Administration  whole  Agitated Behavioral Scale  14  ABS Level of Severity  No Agitation    Fall Risk  Has the patient had a fall this admission?   No  Deanne Cheng Fall Risk Scoring  12, MODERATE RISK  Fall Risk Safety Measures  bed alarm, chair alarm, and poor balance    Vitals  Temperature: 37.3 °C (99.2 °F)  Temp src: Oral  Pulse: 92  Respiration: 20  Blood Pressure : (!) 142/81  Blood Pressure MAP (Calculated): 101 MM HG  BP Location: Upper Arm  Patient BP Position: Sitting     Oxygen  Pulse Oximetry: 95 %  O2 (LPM): 0  O2 Delivery Device: None - Room Air  Incentive Spirometer Volume: 1500 mL    Bowel and Bladder  Last Bowel Movement  07/14/23  Stool Type  Not observed  Bowel Device  Bathroom  Continent  Bladder: Continent void   Bowel: Continent movement  Bladder Function  Urine Void (mL):  (MOderate)  Number of Times Voided: 1  Urinary Options: Yes  Urine Color: Yellow  Number of Times Incontinent of Urine: 0  Genitourinary Assessment   Bladder Assessment (WDL):  WDL Except  Bhatt Catheter: Not Applicable  Urine Color: Yellow  Number of Bladder Accidents: 0  Total Number of  Bladder of Accidents in Last 7 Days: 0  Number of Times Incontinent of Urine: 0  Bladder Device: Bathroom  Bladder Scan: Post Void  $ Bladder Scan Results (mL): 82    Skin  Parveen Score   18  Sensory Interventions   Bed Types: Standard/Trauma Mattress  Skin Preventative Measures: Pillows in Use for Support / Positioning  Moisture Interventions         Pain  Pain Rating Scale  0 - No Pain  Pain Location  Shoulder  Pain Location Orientation  Right, Posterior  Pain Interventions   Declines    ADLs    Bathing   Shower, Staff (OT)  Linen Change      Personal Hygiene  Perineal Care (HANDWASHED)  Chlorhexidine Bath      Oral Care  Brushed Teeth  Teeth/Dentures     Shave     Nutrition Percentage Eaten  Dinner, Between % Consumed  Environmental Precautions  Treaded Slipper Socks on Patient, Bed in Low Position  Patient Turns/Positioning  Patient Turns Self from Side to Side  Patient Turns Assistance/Tolerance  Standby Assist, Tolerates Well, General Weakness  Bed Positions  Bed Controls On, Bed Locked  Head of Bed Elevated  Self regulated      Psychosocial/Neurologic Assessment  Psychosocial Assessment  Psychosocial (WDL):  Within Defined Limits  Neurologic Assessment  Neuro (WDL): Exceptions to WDL  Level of Consciousness: Alert  Orientation Level: Oriented X4  Cognition: Follows commands, Appropriate attention/concentration, Appropriate safety awareness, Appropriate judgement  Speech: Clear  Pupil Assesment: Yes  R Pupil Size (mm): 3  R Pupil Shape / Description: Round  R Pupil Reaction: Brisk  L Pupil Size (mm): 3  L Pupil Shape / Description: Round  L Pupil Reaction: Brisk  Motor Function/Sensation Assessment: Motor strength  RUE Sensation: Full sensation  Muscle Strength Right Arm: Fair Strength against Gravity but No Resistance  LUE Sensation: Full sensation  Muscle Strength Left Arm: Good Strength Against Gravity and Moderate Resistance  RLE Sensation: Full sensation  Muscle Strength Right Leg: Good Strength  Against Gravity and Moderate Resistance  LLE Sensation: Full sensation  Muscle Strength Left Leg: Good Strength Against Gravity and Moderate Resistance  EENT (WDL):  WDL Except    Cardio/Pulmonary Assessment  Edema      Respiratory Breath Sounds  RUL Breath Sounds: Clear  RML Breath Sounds: Clear  RLL Breath Sounds: Diminished  OPAL Breath Sounds: Clear  LLL Breath Sounds: Diminished  Cardiac Assessment   Cardiac (WDL):  WDL Except

## 2023-07-15 NOTE — PROGRESS NOTES
NURSING DAILY NOTE    Name: Nyasia Schuster   Date of Admission: 7/5/2023   Admitting Diagnosis: Ischemic stroke (HCC)  Attending Physician: Unique Black M.d.  Allergies: Patient has no known allergies.    Safety  Patient Assist  CGA  Patient Precautions  Fall Risk  Precaution Comments  zio patch, RUE sling to prevent subluxation  Bed Transfer Status  Contact Guard Assist  Toilet Transfer Status   Standby Assist (ambulate bed<>toilet with SPC)  Assistive Devices  Wheelchair  Oxygen  None - Room Air  Diet/Therapeutic Dining  Current Diet Order   Procedures    Diet Order Diet: Level 7 - Easy to Chew (meds whole with thins, Please add a T- rocker knife to all plates.); Liquid level: Level 0 - Thin; Second Modifier: (optional): Consistent CHO (Diabetic)     Pill Administration  whole  Agitated Behavioral Scale  14  ABS Level of Severity  No Agitation    Fall Risk  Has the patient had a fall this admission?   No  Deanne Cheng Fall Risk Scoring  8, LOW RISK  Fall Risk Safety Measures  bed alarm, chair alarm, and poor balance    Vitals  Temperature: 36.8 °C (98.3 °F)  Temp src: Oral  Pulse: 98  Respiration: 19  Blood Pressure : (!) 144/77  Blood Pressure MAP (Calculated): 99 MM HG  BP Location: Right, Upper Arm  Patient BP Position: Supine     Oxygen  Pulse Oximetry: 95 %  O2 (LPM): 0  O2 Delivery Device: None - Room Air  Incentive Spirometer Volume: 1500 mL    Bowel and Bladder  Last Bowel Movement  07/14/23  Stool Type  Not observed  Bowel Device  Bathroom  Continent  Bladder: Continent void   Bowel: Continent movement  Bladder Function  Urine Void (mL):  (MOderate)  Number of Times Voided: 1  Urinary Options: Yes  Urine Color: Yellow  Number of Times Incontinent of Urine: 0  Genitourinary Assessment   Bladder Assessment (WDL):  WDL Except  Bhatt Catheter: Not Applicable  Urine Color: Yellow  Number of Bladder Accidents: 0  Total Number of Bladder of  Accidents in Last 7 Days: 0  Number of Times Incontinent of Urine: 0  Bladder Device: Bathroom  Bladder Scan: Post Void  $ Bladder Scan Results (mL): 82    Skin  Parveen Score   18  Sensory Interventions   Bed Types: Standard/Trauma Mattress  Skin Preventative Measures: Pillows in Use for Support / Positioning, Seat Cushion in Use on Chair when Out of Bed  Moisture Interventions         Pain  Pain Rating Scale  0 - No Pain  Pain Location  Shoulder  Pain Location Orientation  Right, Posterior  Pain Interventions   Declines    ADLs    Bathing   Shower, Staff (OT)  Linen Change      Personal Hygiene  Perineal Care (HANDWASHED)  Chlorhexidine Bath      Oral Care  Brushed Teeth  Teeth/Dentures     Shave     Nutrition Percentage Eaten  *  * Meal *  *, Lunch, Between % Consumed  Environmental Precautions  Treaded Slipper Socks on Patient, Bed in Low Position  Patient Turns/Positioning  Sitting Up in Wheelchair  Patient Turns Assistance/Tolerance  Standby Assist, Tolerates Well, General Weakness  Bed Positions  Bed Controls On, Bed Locked  Head of Bed Elevated  Self regulated      Psychosocial/Neurologic Assessment  Psychosocial Assessment  Psychosocial (WDL):  Within Defined Limits  Neurologic Assessment  Neuro (WDL): Exceptions to WDL  Level of Consciousness: Alert  Orientation Level: Oriented X4  Cognition: Follows commands, Appropriate attention/concentration, Appropriate safety awareness, Appropriate judgement  Speech: Clear  Pupil Assesment: Yes  R Pupil Size (mm): 3  R Pupil Shape / Description: Round  R Pupil Reaction: Brisk  L Pupil Size (mm): 3  L Pupil Shape / Description: Round  L Pupil Reaction: Brisk  Motor Function/Sensation Assessment: Motor strength  RUE Sensation: Full sensation  Muscle Strength Right Arm: Fair Strength against Gravity but No Resistance  LUE Sensation: Full sensation  Muscle Strength Left Arm: Good Strength Against Gravity and Moderate Resistance  RLE Sensation: Full sensation  Muscle  Strength Right Leg: Good Strength Against Gravity and Moderate Resistance  LLE Sensation: Full sensation  Muscle Strength Left Leg: Good Strength Against Gravity and Moderate Resistance  EENT (WDL):  WDL Except    Cardio/Pulmonary Assessment  Edema      Respiratory Breath Sounds  RUL Breath Sounds: Clear  RML Breath Sounds: Clear  RLL Breath Sounds: Diminished  OPAL Breath Sounds: Clear  LLL Breath Sounds: Diminished  Cardiac Assessment   Cardiac (WDL):  WDL Except

## 2023-07-15 NOTE — CARE PLAN
"The patient is Stable - Low risk of patient condition declining or worsening      Problem: Fall Risk - Rehab  Goal: Patient will remain free from falls  Outcome: Progressing   Note: Deanne Cheng Fall risk Assessment Score: 12    Moderate fall risk Interventions  - Bed and strip alarm   - Yellow sign by the door   - Yellow wrist band \"Fall risk\"  - Room near to the nurse station  - Do not leave patient unattended in the bathroom  - Fall risk education provided          Problem: Skin Integrity  Goal: Patient's skin integrity will be maintained or improve  Outcome: Progressing   Note: Patient's skin remains intact and free from new or accidental injury this shift.  Will continue to monitor.     "

## 2023-07-15 NOTE — CARE PLAN
Problem: Knowledge Deficit - Standard  Goal: Patient and family/care givers will demonstrate understanding of plan of care, disease process/condition, diagnostic tests and medications  Outcome: Progressing  Discussed metformin side effects and  lower dosage. She verbalized understanding

## 2023-07-15 NOTE — PROGRESS NOTES
Hospital Medicine Daily Progress Note      Chief Complaint  Hypertension  Diabetes    Interval Problem Update  Pt having some GI upset and may be from the Metformin -- will change to XR to see if this helps.    Review of Systems  Review of Systems   Constitutional:  Negative for fever.   Eyes:  Negative for blurred vision.   Respiratory:  Negative for shortness of breath.    Cardiovascular:  Negative for palpitations.   Gastrointestinal:  Negative for nausea and vomiting.        Has upset stomach recently   Neurological:  Negative for dizziness and headaches.   Psychiatric/Behavioral:  Negative for hallucinations.         Physical Exam  Temp:  [36.7 °C (98.1 °F)-37.3 °C (99.2 °F)] 36.7 °C (98.1 °F)  Pulse:  [70-98] 70  Resp:  [16-20] 16  BP: (128-146)/(66-81) 146/71  SpO2:  [90 %-95 %] 92 %    Physical Exam  Vitals and nursing note reviewed.   Constitutional:       General: She is not in acute distress.  HENT:      Mouth/Throat:      Mouth: Mucous membranes are moist.      Pharynx: Oropharynx is clear.   Eyes:      General: No scleral icterus.  Cardiovascular:      Rate and Rhythm: Normal rate and regular rhythm.   Pulmonary:      Effort: Pulmonary effort is normal.      Breath sounds: No wheezing or rales.   Abdominal:      General: Bowel sounds are normal.      Palpations: Abdomen is soft.   Musculoskeletal:      Cervical back: No rigidity.      Right lower leg: No edema.      Left lower leg: No edema.   Skin:     General: Skin is warm and dry.   Neurological:      Mental Status: She is alert and oriented to person, place, and time.   Psychiatric:         Mood and Affect: Mood normal.         Behavior: Behavior normal.         Fluids    Intake/Output Summary (Last 24 hours) at 7/15/2023 0950  Last data filed at 7/15/2023 0800  Gross per 24 hour   Intake 720 ml   Output --   Net 720 ml         Laboratory        Recent Labs     07/15/23  0616   SODIUM 139   POTASSIUM 4.3   CHLORIDE 103   CO2 23   GLUCOSE 121*   BUN  12   CREATININE 0.48*   CALCIUM 9.4                     Assessment/Plan  * Ischemic stroke (HCC)- (present on admission)  Assessment & Plan  Cont ASA, Plavix  Cont Lipitor  Has ZioPatch    Hypertension  Assessment & Plan  BP ok but occ rises up a little  Cont Lisinopril --> will increase dose a little  Cont to monitor    Type 2 diabetes mellitus without complication, without long-term current use of insulin (HCC)- (present on admission)  Assessment & Plan  Hba1c: 12.6 (7/2)  BS recently: 116-170  Cont Metformin: 850 mg bid --> 1000 mg bid (7/13 am) --> will change to XR 1000 mg qhs (7/15)  Pt may be having GI upset from the Metformin  Note: home meds include Metformin & Glipizide 2.5 mg qam           has not taken her meds for > 8 years because 2nd to not getting regular medical care  Cont to monitor    Tobacco abuse- (present on admission)  Assessment & Plan  On Dulera  RT protocol

## 2023-07-15 NOTE — FLOWSHEET NOTE
07/15/23 0635   Events/Summary/Plan   Events/Summary/Plan mdi   Vital Signs   Pulse 91   Respiration 18   Pulse Oximetry 90 %   $ Pulse Oximetry (Spot Check) Yes   Respiratory Assessment   Level of Consciousness Alert   Chest Exam   Work Of Breathing / Effort Within Normal Limits   Breath Sounds   RUL Breath Sounds Clear   RML Breath Sounds Clear   RLL Breath Sounds Diminished   OPAL Breath Sounds Clear   LLL Breath Sounds Diminished   Oxygen   O2 Delivery Device Room air w/o2 available

## 2023-07-16 ENCOUNTER — APPOINTMENT (OUTPATIENT)
Dept: OCCUPATIONAL THERAPY | Facility: REHABILITATION | Age: 68
DRG: 056 | End: 2023-07-16
Attending: PHYSICAL MEDICINE & REHABILITATION
Payer: MEDICARE

## 2023-07-16 ENCOUNTER — APPOINTMENT (OUTPATIENT)
Dept: PHYSICAL THERAPY | Facility: REHABILITATION | Age: 68
DRG: 056 | End: 2023-07-16
Attending: PHYSICAL MEDICINE & REHABILITATION
Payer: MEDICARE

## 2023-07-16 PROBLEM — R33.9 URINARY RETENTION: Status: RESOLVED | Noted: 2023-07-05 | Resolved: 2023-07-16

## 2023-07-16 PROBLEM — B37.0 ORAL THRUSH: Status: RESOLVED | Noted: 2023-07-05 | Resolved: 2023-07-16

## 2023-07-16 PROBLEM — J96.01 ACUTE RESPIRATORY FAILURE WITH HYPOXIA (HCC): Status: RESOLVED | Noted: 2023-07-05 | Resolved: 2023-07-16

## 2023-07-16 LAB
GLUCOSE BLD STRIP.AUTO-MCNC: 133 MG/DL (ref 65–99)
GLUCOSE BLD STRIP.AUTO-MCNC: 147 MG/DL (ref 65–99)
GLUCOSE BLD STRIP.AUTO-MCNC: 153 MG/DL (ref 65–99)
GLUCOSE BLD STRIP.AUTO-MCNC: 221 MG/DL (ref 65–99)

## 2023-07-16 PROCEDURE — 700102 HCHG RX REV CODE 250 W/ 637 OVERRIDE(OP): Performed by: HOSPITALIST

## 2023-07-16 PROCEDURE — 97116 GAIT TRAINING THERAPY: CPT

## 2023-07-16 PROCEDURE — 99232 SBSQ HOSP IP/OBS MODERATE 35: CPT | Performed by: HOSPITALIST

## 2023-07-16 PROCEDURE — 770010 HCHG ROOM/CARE - REHAB SEMI PRIVAT*

## 2023-07-16 PROCEDURE — A9270 NON-COVERED ITEM OR SERVICE: HCPCS | Performed by: HOSPITALIST

## 2023-07-16 PROCEDURE — 97535 SELF CARE MNGMENT TRAINING: CPT

## 2023-07-16 PROCEDURE — 97110 THERAPEUTIC EXERCISES: CPT

## 2023-07-16 PROCEDURE — 97530 THERAPEUTIC ACTIVITIES: CPT

## 2023-07-16 PROCEDURE — A9270 NON-COVERED ITEM OR SERVICE: HCPCS | Performed by: PHYSICAL MEDICINE & REHABILITATION

## 2023-07-16 PROCEDURE — 94640 AIRWAY INHALATION TREATMENT: CPT

## 2023-07-16 PROCEDURE — 99232 SBSQ HOSP IP/OBS MODERATE 35: CPT | Performed by: PHYSICAL MEDICINE & REHABILITATION

## 2023-07-16 PROCEDURE — 94760 N-INVAS EAR/PLS OXIMETRY 1: CPT

## 2023-07-16 PROCEDURE — 82962 GLUCOSE BLOOD TEST: CPT

## 2023-07-16 PROCEDURE — 700102 HCHG RX REV CODE 250 W/ 637 OVERRIDE(OP): Performed by: PHYSICAL MEDICINE & REHABILITATION

## 2023-07-16 RX ORDER — CLOPIDOGREL BISULFATE 75 MG/1
75 TABLET ORAL DAILY
Qty: 30 TABLET | Refills: 0 | Status: SHIPPED | OUTPATIENT
Start: 2023-07-16 | End: 2023-07-28 | Stop reason: SDUPTHER

## 2023-07-16 RX ORDER — METFORMIN HYDROCHLORIDE 500 MG/1
1000 TABLET, EXTENDED RELEASE ORAL
Qty: 60 TABLET | Refills: 0 | Status: SHIPPED | OUTPATIENT
Start: 2023-07-16 | End: 2023-07-28 | Stop reason: SDUPTHER

## 2023-07-16 RX ORDER — BUDESONIDE AND FORMOTEROL FUMARATE DIHYDRATE 160; 4.5 UG/1; UG/1
2 AEROSOL RESPIRATORY (INHALATION) 2 TIMES DAILY
Qty: 10.2 G | Refills: 0 | Status: SHIPPED | OUTPATIENT
Start: 2023-07-16 | End: 2023-08-23

## 2023-07-16 RX ORDER — SIMVASTATIN 40 MG
40 TABLET ORAL NIGHTLY
Qty: 30 TABLET | Refills: 0 | Status: SHIPPED | OUTPATIENT
Start: 2023-07-16 | End: 2023-07-28 | Stop reason: SDUPTHER

## 2023-07-16 RX ORDER — LISINOPRIL 10 MG/1
10 TABLET ORAL DAILY
Qty: 30 TABLET | Refills: 0 | Status: SHIPPED | OUTPATIENT
Start: 2023-07-17 | End: 2023-07-28 | Stop reason: SDUPTHER

## 2023-07-16 RX ORDER — METFORMIN HYDROCHLORIDE 500 MG/1
1000 TABLET, EXTENDED RELEASE ORAL
Status: DISCONTINUED | OUTPATIENT
Start: 2023-07-16 | End: 2023-07-17 | Stop reason: HOSPADM

## 2023-07-16 RX ADMIN — MOMETASONE FUROATE AND FORMOTEROL FUMARATE DIHYDRATE 2 PUFF: 200; 5 AEROSOL RESPIRATORY (INHALATION) at 08:43

## 2023-07-16 RX ADMIN — METFORMIN HYDROCHLORIDE 1000 MG: 500 TABLET, EXTENDED RELEASE ORAL at 18:01

## 2023-07-16 RX ADMIN — NICOTINE TRANSDERMAL SYSTEM 21 MG: 21 PATCH, EXTENDED RELEASE TRANSDERMAL at 06:11

## 2023-07-16 RX ADMIN — ATORVASTATIN CALCIUM 40 MG: 40 TABLET, FILM COATED ORAL at 21:00

## 2023-07-16 RX ADMIN — MOMETASONE FUROATE AND FORMOTEROL FUMARATE DIHYDRATE 2 PUFF: 200; 5 AEROSOL RESPIRATORY (INHALATION) at 21:00

## 2023-07-16 RX ADMIN — CLOPIDOGREL BISULFATE 75 MG: 75 TABLET ORAL at 09:37

## 2023-07-16 RX ADMIN — LISINOPRIL 10 MG: 5 TABLET ORAL at 06:12

## 2023-07-16 RX ADMIN — INSULIN LISPRO 2 UNITS: 100 INJECTION, SOLUTION INTRAVENOUS; SUBCUTANEOUS at 09:31

## 2023-07-16 RX ADMIN — LORATADINE 10 MG: 10 TABLET ORAL at 09:37

## 2023-07-16 RX ADMIN — INSULIN LISPRO 4 UNITS: 100 INJECTION, SOLUTION INTRAVENOUS; SUBCUTANEOUS at 21:03

## 2023-07-16 ASSESSMENT — BALANCE ASSESSMENTS
LONG VERSION TOTAL SCORE (MAX 56): 47
PLACE ALTERNATE FOOT ON STEP OR STOOL WHILE STANDING UNSUPPORTED: 2
STANDING UNSUPPORTED WITH FEET TOGETHER: 4
LONG VERSION TOTAL SCORE (MAX 56): 47
PICK UP OBJECT FROM THE FLOOR FROM A STANDING POSITION: 3
MEDICARE IMPAIRMENT PERCENTAGE: 16
STANDING UNSUPPORTED WITH EYES CLOSED: 4
STANDING TO SITTING: 4
LOOK OVER LEFT AND RIGHT SHOULDERS WHILE STANDING: 3
SITTING UNSUPPORTED: 4
REACHING FORWARD WITH OUTSTRETCHED ARM WHILE STANDING: 2
STANDING UNSUPPORTED: 4
STANDING ON ONE LEG: 4
TRANSFERS: 4
SITTING TO STANDING: 4
STANDING UNSUPPORTED ONE FOOT IN FRONT: 2
TURN 360 DEGREES: 3

## 2023-07-16 ASSESSMENT — GAIT ASSESSMENTS
DEVIATION: DECREASED BASE OF SUPPORT;STEP TO
ASSISTIVE DEVICE: QUAD CANE
DISTANCE (FEET): 250
GAIT LEVEL OF ASSIST: MODIFIED INDEPENDENT

## 2023-07-16 ASSESSMENT — BRIEF INTERVIEW FOR MENTAL STATUS (BIMS)
WHAT DAY OF THE WEEK IS IT: CORRECT
INITIAL REPETITION OF BED BLUE SOCK - FIRST ATTEMPT: 3
WHAT YEAR IS IT: CORRECT
BIMS SUMMARY SCORE: 12
ASKED TO RECALL BLUE: YES, NO CUE REQUIRED
ASKED TO RECALL BED: YES, AFTER CUEING (A PIECE OF FURNITURE")"
WHAT MONTH IS IT: ACCURATE WITHIN 5 DAYS
ASKED TO RECALL SOCK: NO, COULD NOT RECALL

## 2023-07-16 ASSESSMENT — ENCOUNTER SYMPTOMS
FEVER: 0
DIARRHEA: 0
DIZZINESS: 0
COUGH: 0
BLURRED VISION: 0
NERVOUS/ANXIOUS: 0

## 2023-07-16 ASSESSMENT — ACTIVITIES OF DAILY LIVING (ADL)
TOILET_TRANSFER_LEVEL_OF_ASSIST: ABLE TO COMPLETE TOILET TRANSFER WITHOUT ASSIST
TOILETING_LEVEL_OF_ASSIST: ABLE TO COMPLETE TOILETING WITHOUT ASSIST
SHOWER_TRANSFER_LEVEL_OF_ASSIST: REQUIRES SUPERVISION WITH SHOWER TRANSFER
BED_CHAIR_WHEELCHAIR_TRANSFER_DESCRIPTION: ADAPTIVE EQUIPMENT;INCREASED TIME

## 2023-07-16 ASSESSMENT — FIBROSIS 4 INDEX: FIB4 SCORE: 2

## 2023-07-16 ASSESSMENT — PAIN DESCRIPTION - PAIN TYPE: TYPE: ACUTE PAIN

## 2023-07-16 NOTE — CARE PLAN
Problem: Balance  Goal: STG-Within one week, patient will tolerate [place] Balance Scale assessment.  Outcome: Met     Problem: Mobility  Goal: STG-Within one week, patient will ambulate up/down flight of stairs with LUE support and SBA-CGA.  Outcome: Met     Problem: Mobility Transfers  Goal: STG-Within one week, patient will perform bed mobility independently.  Outcome: Met     Problem: PT-Long Term Goals  Goal: LTG-By discharge, patient will ambulate household distances mod I with LRAD, community distances supervised with LRAD.  Outcome: Met  Goal: LTG-By discharge, patient will transfer one surface to another with LRAD mod I.  Outcome: Met  Goal: LTG-By discharge, patient will ambulate up/down flight of stairs with LUE support and supervision.  Outcome: Met  Goal: LTG-By discharge, patient will transfer in/out of a car with LRAD and supervision.  Outcome: Met

## 2023-07-16 NOTE — PROGRESS NOTES
Hospital Medicine Daily Progress Note      Chief Complaint  Hypertension  Diabetes    Interval Problem Update  Pt tolerating Metformin XR so far.  Will continue to monitor.    Review of Systems  Review of Systems   Constitutional:  Negative for fever.   Eyes:  Negative for blurred vision.   Respiratory:  Negative for cough.    Cardiovascular:  Negative for chest pain.   Gastrointestinal:  Negative for diarrhea.   Musculoskeletal:  Negative for joint pain.   Neurological:  Negative for dizziness.   Psychiatric/Behavioral:  The patient is not nervous/anxious.         Physical Exam  Temp:  [36.6 °C (97.8 °F)-37.2 °C (99 °F)] 37.1 °C (98.7 °F)  Pulse:  [] 94  Resp:  [16-20] 16  BP: (129-137)/(62-73) 136/62  SpO2:  [92 %-95 %] 94 %    Physical Exam  Vitals and nursing note reviewed.   Constitutional:       Appearance: She is not diaphoretic.   HENT:      Mouth/Throat:      Pharynx: No oropharyngeal exudate or posterior oropharyngeal erythema.   Eyes:      Extraocular Movements: Extraocular movements intact.   Neck:      Vascular: No carotid bruit.   Cardiovascular:      Rate and Rhythm: Normal rate and regular rhythm.   Pulmonary:      Effort: Pulmonary effort is normal.      Breath sounds: No wheezing or rales.   Abdominal:      General: There is no distension.      Palpations: Abdomen is soft.      Tenderness: There is no abdominal tenderness.   Musculoskeletal:      Right lower leg: No edema.      Left lower leg: No edema.   Skin:     General: Skin is warm and dry.   Neurological:      Mental Status: She is alert and oriented to person, place, and time.   Psychiatric:         Mood and Affect: Mood normal.         Behavior: Behavior normal.         Fluids    Intake/Output Summary (Last 24 hours) at 7/16/2023 0943  Last data filed at 7/16/2023 0826  Gross per 24 hour   Intake 840 ml   Output --   Net 840 ml         Laboratory        Recent Labs     07/15/23  0616   SODIUM 139   POTASSIUM 4.3   CHLORIDE 103   CO2  23   GLUCOSE 121*   BUN 12   CREATININE 0.48*   CALCIUM 9.4                     Assessment/Plan  * Ischemic stroke (HCC)- (present on admission)  Assessment & Plan  Cont ASA, Plavix  Cont Lipitor  Has ZioPatch    Hypertension  Assessment & Plan  BP better after med dose increased   Cont Lisinopril --> dose increased rescently  Cont to monitor    Type 2 diabetes mellitus without complication, without long-term current use of insulin (HCC)- (present on admission)  Assessment & Plan  Hba1c: 12.6 (7/2)  BS recently: 110-170  Cont Metformin: 850 mg bid --> 1000 mg bid (7/13 am) --> XR 1000 mg qhs (7/15)  Pt may be having GI upset from the Metformin  Note: home meds include Metformin & Glipizide 2.5 mg qam           has not taken her meds for > 8 years because 2nd to not getting regular medical care  Cont to monitor    Tobacco abuse- (present on admission)  Assessment & Plan  On Dulera  RT protocol       98.6

## 2023-07-16 NOTE — DISCHARGE INSTRUCTIONS
Occupational Therapy Discharge Instructions for Nyasia Schuster    7/16/2023    Level of Assist Required for Eating: Able to Complete Eating without Assist  Level of Assist Required for Grooming: Able to Complete Grooming without Assist  Level of Assist Required for Dressing: Able to Complete Dressing without Assist  Level of Assist Required for Toileting: Able to Complete Toileting without Assist  Level of Assist Required for Toilet Transfer: Able to Complete Toilet Transfer without Assist  Equipment for Toilet Transfer: Grab Bars by Toilet  Level of Assist Required for Bathing: Requires Supervision with Bathing  Equipment for Bathing: Shower Chair, Grab Bars in Tub / Shower, Hand Held Shower Head  Level of Assist Required for Shower Transfer: Requires Supervision with Shower Transfer  Equipment for Shower Transfer: Grab Bars in Tub / Shower, Shower Chair  Level of Assist Required for Home Mgmt: Requires Supervision with Home Management  Level of Assist Required for Meal Prep: Requires Physical Assist with Meal Preparation  Driving: May not Drive, Please Contact Physician for Further Information  Home Exercise Program: Refer to Home Exercise Program Handout for Details

## 2023-07-16 NOTE — PROGRESS NOTES
NURSING DAILY NOTE    Name: Nyasia Schuster   Date of Admission: 7/5/2023   Admitting Diagnosis: Ischemic stroke (HCC)  Attending Physician: Unique Black M.d.  Allergies: Patient has no known allergies.    Safety  Patient Assist  SBA  Patient Precautions  Fall Risk  Precaution Comments  zio patch, RUE sling to prevent subluxation  Bed Transfer Status  Standby Assist (car transfer x 4 with PT/sister SBA with verbal cues for safety)  Toilet Transfer Status   Standby Assist (ambulate bed<>toilet with SPC)  Assistive Devices  Wheelchair, Rails  Oxygen  None - Room Air  Diet/Therapeutic Dining  Current Diet Order   Procedures    Diet Order Diet: Level 7 - Easy to Chew (meds whole with thins, Please add a T- rocker knife to all plates.); Liquid level: Level 0 - Thin; Second Modifier: (optional): Consistent CHO (Diabetic)     Pill Administration  whole  Agitated Behavioral Scale  14  ABS Level of Severity  No Agitation    Fall Risk  Has the patient had a fall this admission?   No  Deanne Cheng Fall Risk Scoring  12, MODERATE RISK  Fall Risk Safety Measures  bed alarm and chair alarm    Vitals  Temperature: 37.2 °C (99 °F)  Temp src: Oral  Pulse: 96  Respiration: 18  Blood Pressure : 129/73  Blood Pressure MAP (Calculated): 92 MM HG  BP Location: Left, Upper Arm  Patient BP Position: Sitting     Oxygen  Pulse Oximetry: 95 %  O2 (LPM): 0  O2 Delivery Device: None - Room Air  Incentive Spirometer Volume: 1500 mL    Bowel and Bladder  Last Bowel Movement  07/15/23  Stool Type  Type 7: Watery, no solid pieces-entirely liquid  Bowel Device  Bathroom  Continent  Bladder: Continent void   Bowel: Continent movement  Bladder Function  Urine Void (mL):  (MOderate)  Number of Times Voided: 1  Urinary Options: Yes  Urine Color: Unable To Evaluate  Number of Times Incontinent of Urine: 0  Genitourinary Assessment   Bladder Assessment (WDL):  WDL Except  Bhatt  Catheter: Not Applicable  Urine Color: Unable To Evaluate  Number of Bladder Accidents: 0  Total Number of Bladder of Accidents in Last 7 Days: 0  Number of Times Incontinent of Urine: 0  Bladder Device: Bathroom  Bladder Scan: Post Void  $ Bladder Scan Results (mL): 82    Skin  Parveen Score   18  Sensory Interventions   Bed Types: Standard/Trauma Mattress  Skin Preventative Measures: Pillows in Use for Support / Positioning  Moisture Interventions         Pain  Pain Rating Scale  0 - No Pain  Pain Location  Shoulder  Pain Location Orientation  Right, Posterior  Pain Interventions   Declines    ADLs    Bathing   Shower, * * With Assistance from, Staff  Linen Change      Personal Hygiene  Perineal Care (HANDWASHED)  Chlorhexidine Bath      Oral Care  Brushed Teeth  Teeth/Dentures     Shave     Nutrition Percentage Eaten  *  * Meal *  *, Dinner, Between % Consumed  Environmental Precautions  Personal Belongings, Wastebasket, [place] etc. in Easy Reach, Treaded Slipper Socks on Patient, Bed in Low Position, Transferred to Stronger Side  Patient Turns/Positioning  Sitting Up in Wheelchair  Patient Turns Assistance/Tolerance  Tolerates Well  Bed Positions  Bed Controls On, Bed Locked  Head of Bed Elevated  Self regulated      Psychosocial/Neurologic Assessment  Psychosocial Assessment  Psychosocial (WDL):  Within Defined Limits  Neurologic Assessment  Neuro (WDL): Exceptions to WDL  Level of Consciousness: Alert  Orientation Level: Oriented X4  Cognition: Follows commands, Appropriate attention/concentration, Appropriate safety awareness, Appropriate judgement  Speech: Clear  Pupil Assesment: Yes  R Pupil Size (mm): 3  R Pupil Shape / Description: Round  R Pupil Reaction: Brisk  L Pupil Size (mm): 3  L Pupil Shape / Description: Round  L Pupil Reaction: Brisk  Motor Function/Sensation Assessment: Motor strength  RUE Sensation: Full sensation  Muscle Strength Right Arm: Fair Strength against Gravity but No  Resistance  LUE Sensation: Full sensation  Muscle Strength Left Arm: Good Strength Against Gravity and Moderate Resistance  RLE Sensation: Full sensation  Muscle Strength Right Leg: Good Strength Against Gravity and Moderate Resistance  LLE Sensation: Full sensation  Muscle Strength Left Leg: Good Strength Against Gravity and Moderate Resistance  EENT (WDL):  WDL Except    Cardio/Pulmonary Assessment  Edema      Respiratory Breath Sounds  RUL Breath Sounds: Clear  RML Breath Sounds: Clear  RLL Breath Sounds: Diminished  OPAL Breath Sounds: Clear  LLL Breath Sounds: Diminished  Cardiac Assessment   Cardiac (WDL):  WDL Except (ramya)

## 2023-07-16 NOTE — THERAPY
Physical Therapy   Daily Treatment     Patient Name: Nyasia Schuster  Age:  67 y.o., Sex:  female  Medical Record #: [id number]  Today's Date: 7/16/2023     Precautions  Precautions: Fall Risk  Comments: zio patch, RUE sling to prevent subluxation    Subjective    I am ready to go home.     Objective       07/16/23 0701   PT Charge Group   PT Gait Training (Units) 2   PT Therapeutic Exercise (Units) 1   PT Therapeutic Activities (Units) 1   PT Total Time Spent   PT Individual Total Time Spent (Mins) 60   Precautions   Precautions Fall Risk   Pain   Intervention Declines   Cognition    Cognition / Consciousness WDL   Level of Consciousness Alert   Comments pleasant to work with   ABS (Agitated Behavior Scale)   Agitated Behavior Scale Performed No   Sleep/Wake Cycle   Sleep & Rest Awake   Gait Functional Level of Assist    Gait Level Of Assist Modified Independent   Assistive Device Quad Cane   Distance (Feet) 250   # of Times Distance was Traveled 1   Deviation Decreased Base Of Support;Step To   Wheelchair Functional Level of Assist   Wheelchair Assist Modified Independent   Distance Wheelchair (Feet or Distance) 50   Wheelchair Description Extra time   Stairs Functional Level of Assist   Level of Assist with Stairs Supervised   # of Stairs Climbed 20   Stairs Description Hand rails;Extra time;Supervision for safety   Transfer Functional Level of Assist   Bed, Chair, Wheelchair Transfer Modified Independent   Bed Chair Wheelchair Transfer Description Adaptive equipment;Increased time   Sitting Lower Body Exercises   Sit to Stand 2 sets of 10   Nustep Resistance Level 2  (10 min, B LE, L UE, steady conversational pace)   Bed Mobility    Supine to Sit Modified Independent   Sit to Supine Modified Independent   Sit to Stand Modified Independent   Scooting Modified Independent   Rolling Modified Independent   Neuro-Muscular Treatments   Neuro-Muscular Treatments Postural Facilitation;Sequencing   Comments erect  posture, retract R shoulder blade   Outcome Measures   Outcome Measures Utilized [place] Balance Scale   [place]illo Balance Scale   Sitting Unsupported (Score 0-4) 4   Change Of Positon: Sitting To Standing (Score 0-4) 4   Change Of Positon: Standing To Sitting (Score 0-4) 4   Transfers (Score 0-4) 4   Standing Unsupported (Score 0-4) 4   Standing With Eyes Closed (Score 0-4) 4   Standing With Feet Together (Score 0-4) 4   Tandem Standing (Score 0-4) 2   Standing On One Leg (Score 0-4) 4   Turning Trunk (Feet Fixed) (Score 0-4) 3   Retrieving Objects From Floor (Score 0-4) 3   Turning 360 Degrees (Score 0-4) 3   Stool Stepping (Score 0-4) 2   Reaching Forward While Standing (Score 0-4) 2   [place] Balance Total Score (0-56) 47   Interdisciplinary Plan of Care Collaboration   IDT Collaboration with  Physical Therapist   Patient Position at End of Therapy Seated;Self Releasing Lap Belt Applied;Chair Alarm On  (transition to breakfast)   Collaboration Comments current level of function   Roll Left and Right   Assistance Needed Independent   CARE Score - Roll Left and Right 6   Sit to Lying   Assistance Needed Independent   CARE Score - Sit to Lying 6   Lying to Sitting on Side of Bed   Assistance Needed Independent   CARE Score - Lying to Sitting on Side of Bed 6   Sit to Stand   Assistance Needed Independent;Adaptive equipment   CARE Score - Sit to Stand 6   Chair/Bed-to-Chair Transfer   Assistance Needed Adaptive equipment   CARE Score - Chair/Bed-to-Chair Transfer 6   Toilet Transfer   Assistance Needed Adaptive equipment   CARE Score - Toilet Transfer 6   Car Transfer   Assistance Needed Set-up / clean-up;Supervision   CARE Score - Car Transfer 4   Walk 10 Feet   Assistance Needed Independent;Adaptive equipment   CARE Score - Walk 10 Feet 6   Walk 50 Feet with Two Turns   Assistance Needed Independent;Adaptive equipment   CARE Score - Walk 50 Feet with Two Turns 6   Walk 150 Feet   Assistance Needed Independent;Adaptive  equipment   CARE Score - Walk 150 Feet 6   Walking 10 Feet on Uneven Surfaces   Assistance Needed Supervision;Adaptive equipment   CARE Score - Walking 10 Feet on Uneven Surfaces 4   1 Step (Curb)   Assistance Needed Supervision;Adaptive equipment   CARE Score - 1 Step (Curb) 4   4 Steps   Assistance Needed Supervision;Adaptive equipment   CARE Score - 4 Steps 4   12 Steps   Assistance Needed Supervision;Adaptive equipment   CARE Score - 12 Steps 4   Picking Up Object   Assistance Needed Adaptive equipment   CARE Score - Picking Up Object 6   Wheel 50 Feet with Two Turns   Reason if not Attempted Activity not applicable   CARE Score - Wheel 50 Feet with Two Turns 9   Type of Wheelchair/Scooter Manual   Wheel 150 Feet   Reason if not Attempted Activity not applicable   CARE Score - Wheel 150 Feet 9   Type of Wheelchair/Scooter Manual   P.T. Discharge Summary   Discharge Location Home   Patient Discharging with Assist of Family   Level of Supervision Required Upon Discharge Intermittent Supervision   Recommended Equipment for Discharge Quad Cane;Reacher   Recommeded Services Upon Discharge Home Health Physical Therapy   Long Term Goals Met 4   Long Term Goals Not Met 0   Criteria for Termination of Services Maximum Function Achieved for Inpatient Rehabilitation   Discharge Instructions to Patient   Level of Assist Required for Ambulation Supervision on Stairs   Distance Patient May Ambulate community distances   Device Recommended for Ambulation Quad Cane   Device Recommended for Transfers Quad Cane   Home Exercise Program Refer to Home Exercise Program Handout for Details     Outdoor gait completed x 2 laps around back patio, over cobblestones and bridge- good safety awareness of uneven surfaces.    Assessment    IRF-Mickey completed with patient. Able to complete household gait Rebecca with quad cane. Good safety awareness demonstrated with transfers and stair training. Mild shuffled gait on R LE, but patient able to  self-correct with VC. No pain reported and patient with no further questions regarding DC plan. Agreeable to HH therapies. All goals met. Patient very motivated for independence.    Strengths: Able to follow instructions, Alert and oriented, Good insight into deficits/needs, Independent prior level of function, Manages pain appropriately, Motivated for self care and independence, Pleasant and cooperative, Supportive family, Willingly participates in therapeutic activities  Barriers: Decreased endurance, Fatigue, Hemiparesis, Home accessibility, Impaired activity tolerance, Impaired balance, Limited mobility    Plan    Prepare for discharge home on Monday    Passport items to be completed: COMPLETED    Physical Therapy Problems (Active)       There are no active problems.

## 2023-07-16 NOTE — FLOWSHEET NOTE
07/16/23 0843   Events/Summary/Plan   Events/Summary/Plan spot check done mdi given pt sitting up doing well   Vital Signs   Pulse 94   Respiration 16   Pulse Oximetry 94 %   $ Pulse Oximetry (Spot Check) Yes   Respiratory Assessment   Respiratory Pattern Within Normal Limits   Level of Consciousness Alert   Chest Exam   Work Of Breathing / Effort Within Normal Limits   Breath Sounds   RUL Breath Sounds Clear   RML Breath Sounds Clear   RLL Breath Sounds Diminished   OPAL Breath Sounds Clear   LLL Breath Sounds Diminished   Oxygen   O2 (LPM) 0   FiO2% 21 %   O2 Delivery Device Room air w/o2 available

## 2023-07-16 NOTE — PROGRESS NOTES
NURSING DAILY NOTE    Name: Nyasia Schuster   Date of Admission: 7/5/2023   Admitting Diagnosis: Ischemic stroke (HCC)  Attending Physician: Unique Black M.d.  Allergies: Patient has no known allergies.    Safety  Patient Assist  sb cg a  Patient Precautions  Fall Risk  Precaution Comments  zio patch, RUE sling to prevent subluxation  Bed Transfer Status  Standby Assist (car transfer x 4 with PT/sister SBA with verbal cues for safety)  Toilet Transfer Status   Standby Assist (ambulate bed<>toilet with SPC)  Assistive Devices  Wheelchair, Rails  Oxygen  None - Room Air  Diet/Therapeutic Dining  Current Diet Order   Procedures    Diet Order Diet: Level 7 - Easy to Chew (meds whole with thins, Please add a T- rocker knife to all plates.); Liquid level: Level 0 - Thin; Second Modifier: (optional): Consistent CHO (Diabetic)     Pill Administration  whole  Agitated Behavioral Scale  14  ABS Level of Severity  No Agitation    Fall Risk  Has the patient had a fall this admission?   No  Deanne Cheng Fall Risk Scoring  12, MODERATE RISK  Fall Risk Safety Measures  bed alarm and chair alarm    Vitals  Temperature: 36.6 °C (97.8 °F)  Temp src: Oral  Pulse: 100  Respiration: 17  Blood Pressure : 137/73  Blood Pressure MAP (Calculated): 94 MM HG  BP Location: Left, Upper Arm  Patient BP Position: Sitting     Oxygen  Pulse Oximetry: 92 %  O2 (LPM): 0  O2 Delivery Device: None - Room Air  Incentive Spirometer Volume: 1500 mL    Bowel and Bladder  Last Bowel Movement  07/15/23  Stool Type  Type 7: Watery, no solid pieces-entirely liquid  Bowel Device  Bathroom  Continent  Bladder: Continent void   Bowel: Continent movement  Bladder Function  Urine Void (mL):  (MOderate)  Number of Times Voided: 1  Urinary Options: Yes  Urine Color: Unable To Evaluate  Number of Times Incontinent of Urine: 0  Genitourinary Assessment   Bladder Assessment (WDL):  WDL Except  Bhatt  Catheter: Not Applicable  Urine Color: Unable To Evaluate  Number of Bladder Accidents: 0  Total Number of Bladder of Accidents in Last 7 Days: 0  Number of Times Incontinent of Urine: 0  Bladder Device: Bathroom  Bladder Scan: Post Void  $ Bladder Scan Results (mL): 82    Skin  Parveen Score   18  Sensory Interventions   Bed Types: Standard/Trauma Mattress  Skin Preventative Measures: Pillows in Use for Support / Positioning  Moisture Interventions         Pain  Pain Rating Scale  0 - No Pain  Pain Location  Shoulder  Pain Location Orientation  Right, Posterior  Pain Interventions   Declines    ADLs    Bathing   Shower, Staff (OT)  Linen Change      Personal Hygiene  Perineal Care (HANDWASHED)  Chlorhexidine Bath      Oral Care  Brushed Teeth  Teeth/Dentures     Shave     Nutrition Percentage Eaten  *  * Meal *  *, Dinner, Between % Consumed  Environmental Precautions  Treaded Slipper Socks on Patient, Bed in Low Position  Patient Turns/Positioning  Patient Turns Self from Side to Side  Patient Turns Assistance/Tolerance  Standby Assist, Tolerates Well, General Weakness  Bed Positions  Bed Controls On, Bed Locked  Head of Bed Elevated  Self regulated      Psychosocial/Neurologic Assessment  Psychosocial Assessment  Psychosocial (WDL):  Within Defined Limits  Neurologic Assessment  Neuro (WDL): Exceptions to WDL  Level of Consciousness: Alert  Orientation Level: Oriented X4  Cognition: Follows commands, Appropriate attention/concentration, Appropriate safety awareness, Appropriate judgement  Speech: Clear  Pupil Assesment: Yes  R Pupil Size (mm): 3  R Pupil Shape / Description: Round  R Pupil Reaction: Brisk  L Pupil Size (mm): 3  L Pupil Shape / Description: Round  L Pupil Reaction: Brisk  Motor Function/Sensation Assessment: Motor strength  RUE Sensation: Full sensation  Muscle Strength Right Arm: Fair Strength against Gravity but No Resistance  LUE Sensation: Full sensation  Muscle Strength Left Arm: Good  Strength Against Gravity and Moderate Resistance  RLE Sensation: Full sensation  Muscle Strength Right Leg: Good Strength Against Gravity and Moderate Resistance  LLE Sensation: Full sensation  Muscle Strength Left Leg: Good Strength Against Gravity and Moderate Resistance  EENT (WDL):  WDL Except    Cardio/Pulmonary Assessment  Edema      Respiratory Breath Sounds  RUL Breath Sounds: Clear  RML Breath Sounds: Clear  RLL Breath Sounds: Diminished  OPAL Breath Sounds: Clear  LLL Breath Sounds: Diminished  Cardiac Assessment   Cardiac (WDL):  WDL Except

## 2023-07-16 NOTE — CARE PLAN
The patient is Stable - Low risk of patient condition declining or worsening    Shift Goals  Clinical Goals: Safety  Patient Goals: Sleep    Progress made toward(s) clinical / shift goals:    Problem: Knowledge Deficit - Standard  Goal: Patient and family/care givers will demonstrate understanding of plan of care, disease process/condition, diagnostic tests and medications  Outcome: Progressing   Patient educated on the POC and medications administered. All questions and concerns addressed at this time  Problem: Fall Risk - Rehab  Goal: Patient will remain free from falls  Outcome: Progressing   Bed is locked and in low position. Call light and belongings within reach    Patient is not progressing towards the following goals:

## 2023-07-16 NOTE — CARE PLAN
Problem: Eating  Goal: STG-Within one week, patient will feed self at Mod I level using AE PRN.  Outcome: Met     Problem: Bathing  Goal: STG-Within one week, patient will bathe at SBA using DME/AE PRN.  Outcome: Met     Problem: Toileting  Goal: STG-Within one week, patient will complete toileting tasks at CGA level using DME PRN.  Outcome: Met     Problem: Functional Transfers  Goal: STG-Within one week, patient will transfer to toilet at SBA level using DME PRN.  Outcome: Met     Problem: OT Long Term Goals  Goal: LTG-By discharge, patient will complete basic self care tasks at SBA-Mod I level using DME/AE PRN.  Outcome: Met  Goal: LTG-By discharge, patient will perform bathroom transfers at SPV-Mod I level using DME/AE PRN.  Outcome: Met  Goal: LTG-By discharge, patient will complete basic home management at Min A-Mod I level using DME/AE PRN.  Outcome: Met

## 2023-07-16 NOTE — PROGRESS NOTES
Rehab Progress Note     Date of Service: 7/16/2023  Chief Complaint: Follow-up stroke    Interval Events (Subjective)    Patient seen and examined today in her room.  She reports her bowels feel better without diarrhea.  They were soft this morning.  We discussed her discharge medications and need to send them to [facility] for the next pricing.  She is looking forward to her discharge tomorrow.    Patient has no other new questions, concerns, or complaints today.       Objective:  VITAL SIGNS: /62   Pulse 94   Temp 37.1 °C (98.7 °F) (Oral)   Resp 16   Ht 1.524 m (5')   Wt 79.8 kg (176 lb)   SpO2 94%   BMI 34.37 kg/m²   Gen: alert, no apparent distress  CV: Regular rate, regular rhythm  Resp: Clear to auscultation bilaterally  Neuro: Right arm/hand hemiparesis      Recent Results (from the past 72 hour(s))   POCT glucose device results    Collection Time: 07/13/23 11:45 AM   Result Value Ref Range    POC Glucose, Blood 118 (H) 65 - 99 mg/dL   POCT glucose device results    Collection Time: 07/13/23  5:07 PM   Result Value Ref Range    POC Glucose, Blood 142 (H) 65 - 99 mg/dL   POCT glucose device results    Collection Time: 07/13/23  8:16 PM   Result Value Ref Range    POC Glucose, Blood 146 (H) 65 - 99 mg/dL   POCT glucose device results    Collection Time: 07/14/23  7:14 AM   Result Value Ref Range    POC Glucose, Blood 159 (H) 65 - 99 mg/dL   POCT glucose device results    Collection Time: 07/14/23 11:10 AM   Result Value Ref Range    POC Glucose, Blood 116 (H) 65 - 99 mg/dL   POCT glucose device results    Collection Time: 07/14/23  4:43 PM   Result Value Ref Range    POC Glucose, Blood 139 (H) 65 - 99 mg/dL   POCT glucose device results    Collection Time: 07/14/23  9:18 PM   Result Value Ref Range    POC Glucose, Blood 170 (H) 65 - 99 mg/dL   Basic Metabolic Panel    Collection Time: 07/15/23  6:16 AM   Result Value Ref Range    Sodium 139 135 - 145 mmol/L    Potassium 4.3 3.6 - 5.5 mmol/L     Chloride 103 96 - 112 mmol/L    Co2 23 20 - 33 mmol/L    Glucose 121 (H) 65 - 99 mg/dL    Bun 12 8 - 22 mg/dL    Creatinine 0.48 (L) 0.50 - 1.40 mg/dL    Calcium 9.4 8.5 - 10.5 mg/dL    Anion Gap 13.0 7.0 - 16.0   MAGNESIUM    Collection Time: 07/15/23  6:16 AM   Result Value Ref Range    Magnesium 1.6 1.5 - 2.5 mg/dL   PHOSPHORUS    Collection Time: 07/15/23  6:16 AM   Result Value Ref Range    Phosphorus 3.6 2.5 - 4.5 mg/dL   ESTIMATED GFR    Collection Time: 07/15/23  6:16 AM   Result Value Ref Range    GFR (CKD-EPI) 103 >60 mL/min/1.73 m 2   POCT glucose device results    Collection Time: 07/15/23  7:41 AM   Result Value Ref Range    POC Glucose, Blood 153 (H) 65 - 99 mg/dL   POCT glucose device results    Collection Time: 07/15/23 11:29 AM   Result Value Ref Range    POC Glucose, Blood 110 (H) 65 - 99 mg/dL   POCT glucose device results    Collection Time: 07/15/23  5:31 PM   Result Value Ref Range    POC Glucose, Blood 153 (H) 65 - 99 mg/dL   POCT glucose device results    Collection Time: 07/15/23  8:40 PM   Result Value Ref Range    POC Glucose, Blood 143 (H) 65 - 99 mg/dL   POCT glucose device results    Collection Time: 07/16/23  8:17 AM   Result Value Ref Range    POC Glucose, Blood 153 (H) 65 - 99 mg/dL       Scheduled Medications   Medication Dose Frequency    metFORMIN ER  1,000 mg PM MEAL    lisinopril  10 mg Q DAY    insulin lispro  2-12 Units 4X/DAY ACHS    atorvastatin  40 mg Q EVENING    clopidogrel  75 mg DAILY    nicotine  21 mg Daily-0600    mometasone-formoterol  2 Puff BID (RT)    loratadine  10 mg DAILY       Current Diet Order   Procedures    Diet Order Diet: Level 7 - Easy to Chew (meds whole with thins, Please add a T- rocker knife to all plates.); Liquid level: Level 0 - Thin; Second Modifier: (optional): Consistent CHO (Diabetic)       Assessment:    This patient is a 67 y.o. female admitted for acute inpatient rehabilitation with Ischemic stroke (HCC).      I, Unique Black,  M.D./MD., was present and led the interdisciplinary team conference on 7/10/2023.  I led the IDT conference and agree with the IDT conference documentation and plan of care as noted below.     Nursing:  Diet Current Diet Order   Procedures    Diet Order Diet: Level 7 - Easy to Chew (meds whole with thins, Please add a T- rocker knife to all plates.); Liquid level: Level 0 - Thin; Second Modifier: (optional): Consistent CHO (Diabetic)       Eating ADL Supervision  Set-up of equipment or meal/tube feeding (See note for self-feeding eval details.)   % of Last Meal  Oral Nutrition: *  * Meal *  *, Dinner, Between % Consumed   Sleep No issues   Bowel Last BM: 07/09/23   Bladder Post Void  82   Barriers to Discharge Home: diabetic education      Physical Therapy:  Bed Mobility    Transfers Contact Guard Assist  Supervision for safety, Adaptive equipment (Stand pivot using quad cane from bed to w/c.)   Mobility Contact Guard Assist   Stairs Minimal Assist (CG/Mc)   Barriers to Discharge Home: stairs at home      Occupational Therapy:  Grooming Standby Assist (Seated in w/c)   Bathing Contact Guard Assist   UB Dressing Stand by Assist   LB Dressing Contact Guard Assist   Toileting Moderate Assist   Shower & Tub Transfer Contact Guard Assist   Barriers to Discharge Home: right arm weakness    Respiratory Therapy:  O2 (LPM): 0  O2 Delivery Device: None - Room Air    Case Management:  Continues to work on disposition and DME needs.      Discharge Date/Disposition:    7/17    HH: PT/OT/RN    Equip: shower bench, small based quad cane    Follow-ups: PCP -patient does not have wounds and needs 1, stroke bridge clinic, Dr. Haines    Rx: [facility] as patient does not have prescription coverage      Problem List/Medical Decision Making and Plan:    Left MCA stroke  Right hemiparesis  PT/OT, 1.5 hr each discipline, 5 days per week    Completed aspirin and Plavix for 10 days    Continue Plavix and atorvastatin    Patient has  cardiac monitor in place, to be returned 7/19, after discharge    Outpatient follow-up with stroke Bridge clinic and Dr. Haines, referrals made    Right shoulder pain, acute on chronic  Exam suggestive of rotator cuff tendinitis/bursitis  X-rays without acute abnormalities  Support hemiplegic arm    Diabetes with hyperglycemia  Continue metformin  Continue sliding scale insulin only as an inpatient  Appreciate hospitalist assistance  Needs glucometer at discharge     Tobacco abuse  Continue nicotine patch and gum  Counseling provided 7/13    Hypertension  Continue lisinopril  Appreciate hospitalist assistance    Upper back pain, resolved  Discontinue Lidoderm patches    Oral thrush, resolved  Completed nystatin     Seasonal allergies  Continue Claritin    Respiratory failure with hypoxia  Likely due to undiagnosed COPD  Continue Dulera  Titrated off oxygen     Abdominal gas, resolved  Completed simethicone    Constipation, resolved  Loose stools, improved  Bowel medications discontinued  Last bowel movement 7/15    DVT prophylaxis  Discontinue Lovenox as patient is walking long distances    Unique Black M.D.  Physical Medicine and Rehabilitation

## 2023-07-16 NOTE — THERAPY
Occupational Therapy   Discharge Summary     Patient Name: Nyasia Schuster  Age:  67 y.o., Sex:  female  Medical Record #: [id number]  Today's Date: 7/16/2023     Precautions  Precautions: Fall Risk  Comments: zio patch, RUE sling to prevent subluxation         Subjective    Pt seated in w/c upon arrival. Pt pleasant and cooperative, agreeable to therapy.     Objective       07/16/23 1331   OT Total Time Spent   OT Individual Total Time Spent (Mins) 60   Precautions   Precautions Fall Risk   Comments zio patch, RUE sling to prevent subluxation   Vitals   O2 Delivery Device Room air w/o2 available   Pain   Intervention Declines   Cognition    Level of Consciousness Alert   Sleep/Wake Cycle   Sleep & Rest Awake;Out of bed   Functional Level of Assist   Bathing Modified Independent   Bathing Description Grab bar;Hand held shower;Tub bench;Set up for wound protection;Supervision for safety   Upper Body Dressing Modified Independent   Upper Body Dressing Description Increased time;Supervision for safety  (using heather techniques)   Lower Body Dressing Modified Independent   Lower Body Dressing Description Increased time;Supervision for safety   Bed, Chair, Wheelchair Transfer Modified Independent   Bed Chair Wheelchair Transfer Description Adaptive equipment;Increased time  (stand pivot from ambulation to w/c using quad cane)   Tub / Shower Transfers Modified Independent   Tub Shower Transfer Description   (Ambulated to fold down bench using quad cane.)   Fine Motor / Dexterity    Fine Motor / Dexterity Yes   Fine Motor / Dexterity Interventions Pt picked up 5 lare pegs with RUE with board at level 3 incline sitting in w/c. Placed 5 large pegs into board flat at table level with RUE. Pt then placed 4 yellow and 1 red cloths pin onto metal agueda using the RUE while seated in w/c.   Interdisciplinary Plan of Care Collaboration   Patient Position at End of Therapy Seated;Chair Alarm On;Self Releasing Lap Belt Applied;Call  "Light within Reach;Tray Table within Reach   Eating   Assistance Needed Independent   Physical Assistance Level No physical assistance   CARE Score - Eating 6   Oral Hygiene   Assistance Needed Independent   Physical Assistance Level No physical assistance   CARE Score - Oral Hygiene 6   Toileting Hygiene   Assistance Needed Independent   Physical Assistance Level No physical assistance   CARE Score - Toileting Hygiene 6   Shower/Bathe Self   Assistance Needed Independent   Physical Assistance Level No physical assistance   CARE Score - Shower/Bathe Self 6   Upper Body Dressing   Assistance Needed Independent   Physical Assistance Level No physical assistance   CARE Score - Upper Body Dressing 6   Lower Body Dressing   Assistance Needed Independent   Physical Assistance Level No physical assistance   CARE Score - Lower Body Dressing 6   Putting On/Taking Off Footwear   Assistance Needed Independent   Physical Assistance Level No physical assistance   CARE Score - Putting On/Taking Off Footwear 6   Toilet Transfer   Assistance Needed Independent;Adaptive equipment   Physical Assistance Level No physical assistance   Comment Using quad cane and GB's   CARE Score - Toilet Transfer 6   Cognitive Pattern Assessment   Cognitive Pattern Assessment Used BIMS   Brief Interview for Mental Status (BIMS)   Repetition of Three Words (First Attempt) 3   Temporal Orientation: Year Correct   Temporal Orientation: Month Accurate within 5 days   Temporal Orientation: Day Correct   Recall: \"Sock\" No, could not recall   Recall: \"Blue\" Yes, no cue required   Recall: \"Bed\" Yes, after cueing (\"a piece of furniture\")   BIMS Summary Score 12   Confusion Assessment Method (CAM)   Is there evidence of an acute change in mental status from the patient's baseline? No   Inattention Behavior not present   Disorganized thinking Behavior not present   Altered level of consciousness Behavior not present   Discharge Summary    Discharge Location  " Home   Patient Discharging with Assist of Family   (Sister)   Level of Supervision Required Intermittent Supervision   Recommended Equipment for Discharge Quad Cane;Shower Chair;Grab Bars by Toilet;Grab Bars in Tub / Shower;Hand Held Shower Head   Recommended Services Upon Discharge Follow-Up Home Health Occupational Therapy Recommended   Long Term Goals Met 3 - pt met goals for ADL's, bathroom transfers, and home mgmt tasks at supervision to Mod I level   Criteria for Termination of Services Maximum Function Achieved for Inpatient Rehabilitation   Discharge Instructions to Patient   Level of Assist Required for Eating Able to Complete Eating without Assist   Level of Assist Required for Grooming Able to Complete Grooming without Assist   Level of Assist Required for Dressing Able to Complete Dressing without Assist   Level of Assist Required for Toileting Able to Complete Toileting without Assist   Level of Assist Required for Toilet Transfer Able to Complete Toilet Transfer without Assist   Equipment for Toilet Transfer Grab Bars by Toilet   Level of Assist Required for Bathing Requires Supervision with Bathing   Equipment for Bathing Shower Chair;Grab Bars in Tub / Shower;Hand Held Shower Head   Level of Assist Required for Shower Transfer Able to Supervision with Shower Transfer without Assist   Equipment for Shower Transfer Grab Bars in Tub / Shower;Shower Chair   Level of Assist Required for Home Mgmt Requires Supervision with Home Management   Level of Assist Required for Meal Prep Requires Physical Assist with Meal Preparation   Driving May not Drive, Please Contact Physician for Further Information   Home Exercise Program Refer to Home Exercise Program Handout for Details     Functional mobility- pt ambulated from bed to fold down bench using quad cane and supervision. Pt able to retrieve clean clothes, carry to bathroom, and bring dirty clothes back using quad cane.     The following OT passport items were  reviewed: perform bathroom transfers, complete dressing, complete feeding, get ready for the day, prepare a simple meal, participate in household tasks, adapt home for safety needs, demonstrate home exercise program, and complete caregiver training     Assessment    Pt required 1 cue to sit while LBD, but was still successful in completing ADLs. Pt was able to place 5 large pegs back into the peg board using the RUE compared to only getting 1 last week. Pt feels confident with returning home and is happy with the progress she has made.    Strengths: Able to follow instructions, Alert and oriented, Effective communication skills, Good carryover of learning, Independent prior level of function, Good insight into deficits/needs, Making steady progress towards goals, Motivated for self care and independence, Pleasant and cooperative, Supportive family, Willingly participates in therapeutic activities  Barriers: Bowel incontinence, Decreased endurance, Fatigue, Generalized weakness, Hemiplegia, Home accessibility, Impaired activity tolerance, Impaired balance, Limited mobility    Plan    D/c tomorrow.    Passport items to be completed:  Perform bathroom transfers, complete dressing, complete feeding, get ready for the day, prepare a simple meal, participate in household tasks, adapt home for safety needs, demonstrate home exercise program, complete caregiver training     Occupational Therapy Goals (Active)       There are no active problems.

## 2023-07-16 NOTE — THERAPY
Occupational Therapy  Daily Treatment     Patient Name: Nyasia Schuster  Age:  67 y.o., Sex:  female  Medical Record #: [id number]  Today's Date: 7/16/2023     Precautions  Precautions: (P) Fall Risk  Comments: (P) zio patch, RUE sling to prevent subluxation         Subjective    Pt seated in w/c upon arrival. Pt pleasant and cooperative, agreeable to therapy.     Objective       07/16/23 1001   OT Total Time Spent   OT Individual Total Time Spent (Mins) 30   Precautions   Precautions Fall Risk   Comments zio patch, RUE sling to prevent subluxation   Pain   Intervention Declines   Cognition    Level of Consciousness Alert   Sleep/Wake Cycle   Sleep & Rest Awake   Functional Level of Assist   Bed, Chair, Wheelchair Transfer Modified Independent   Bed Chair Wheelchair Transfer Description Adaptive equipment;Increased time  (stand pivot from ambulation to w/c using quad cane)   IADL Treatments   IADL Treatments Kitchen mobility education   Kitchen Mobility Education Pt put clean dishes away into cupboards in high and low positions using quad cane and SBA. Pt put away pots, pans, utensils, bowls, and plates with no LOB.   Interdisciplinary Plan of Care Collaboration   Patient Position at End of Therapy Seated;Chair Alarm On;Self Releasing Lap Belt Applied;Call Light within Reach;Tray Table within Reach     Functional mobility- Pt ambulated from room <> ADL kitchen using quad cane with SBA.     Assessment    Pt completed ambulation and kitchen mobility activity with one sitting rest break before ambulating back to room. Pt incorporated the use of the RUE by opening drawers and sliding dishes across the counter to get to the correct cupboard. Pt feels confident in returning home.    Strengths: Able to follow instructions, Alert and oriented, Effective communication skills, Good carryover of learning, Independent prior level of function, Good insight into deficits/needs, Making steady progress towards goals, Motivated for  self care and independence, Pleasant and cooperative, Supportive family, Willingly participates in therapeutic activities  Barriers: Bowel incontinence, Decreased endurance, Fatigue, Generalized weakness, Hemiplegia, Home accessibility, Impaired activity tolerance, Impaired balance, Limited mobility    Plan    Shower this afternoon.    Passport items to be completed:  Perform bathroom transfers, complete dressing, complete feeding, get ready for the day, prepare a simple meal, participate in household tasks, adapt home for safety needs, demonstrate home exercise program, complete caregiver training     Occupational Therapy Goals (Active)       Problem: Bathing       Dates: Start:  07/06/23         Goal: STG-Within one week, patient will bathe at SBA using DME/AE PRN.       Dates: Start:  07/06/23         Goal Note filed on 07/10/23 1142 by Yajaira Pickard MS,OTR/L       Requires CGA                 Problem: Eating       Dates: Start:  07/06/23         Goal: STG-Within one week, patient will feed self at Mod I level using AE PRN.       Dates: Start:  07/06/23         Goal Note filed on 07/10/23 1142 by Yajaira Pickard MS,OTR/L       Requires set-up assist                 Problem: Functional Transfers       Dates: Start:  07/06/23         Goal: STG-Within one week, patient will transfer to toilet at SBA level using DME PRN.       Dates: Start:  07/06/23         Goal Note filed on 07/10/23 1142 by Yajaira Pickard MS,OTR/L       Requires CGA with use quad cane                 Problem: OT Long Term Goals       Dates: Start:  07/06/23         Goal: LTG-By discharge, patient will complete basic self care tasks at SBA-Mod I level using DME/AE PRN.       Dates: Start:  07/06/23            Goal: LTG-By discharge, patient will perform bathroom transfers at SPV-Mod I level using DME/AE PRN.       Dates: Start:  07/06/23            Goal: LTG-By discharge, patient will complete basic home management at Min A-Mod I  level using DME/AE PRN.       Dates: Start:  07/06/23               Problem: Toileting       Dates: Start:  07/06/23         Goal: STG-Within one week, patient will complete toileting tasks at CGA level using DME PRN.       Dates: Start:  07/06/23         Goal Note filed on 07/10/23 1142 by Yajaira Pickard MS,OTR/L       Requires Min A

## 2023-07-17 VITALS
SYSTOLIC BLOOD PRESSURE: 134 MMHG | BODY MASS INDEX: 34.36 KG/M2 | HEART RATE: 81 BPM | OXYGEN SATURATION: 91 % | DIASTOLIC BLOOD PRESSURE: 74 MMHG | TEMPERATURE: 97.7 F | WEIGHT: 175 LBS | HEIGHT: 60 IN | RESPIRATION RATE: 18 BRPM

## 2023-07-17 LAB — GLUCOSE BLD STRIP.AUTO-MCNC: 152 MG/DL (ref 65–99)

## 2023-07-17 PROCEDURE — 94640 AIRWAY INHALATION TREATMENT: CPT

## 2023-07-17 PROCEDURE — A9270 NON-COVERED ITEM OR SERVICE: HCPCS | Performed by: PHYSICAL MEDICINE & REHABILITATION

## 2023-07-17 PROCEDURE — 700102 HCHG RX REV CODE 250 W/ 637 OVERRIDE(OP): Performed by: HOSPITALIST

## 2023-07-17 PROCEDURE — 700102 HCHG RX REV CODE 250 W/ 637 OVERRIDE(OP): Performed by: PHYSICAL MEDICINE & REHABILITATION

## 2023-07-17 PROCEDURE — 82962 GLUCOSE BLOOD TEST: CPT

## 2023-07-17 PROCEDURE — A9270 NON-COVERED ITEM OR SERVICE: HCPCS | Performed by: HOSPITALIST

## 2023-07-17 PROCEDURE — 99239 HOSP IP/OBS DSCHRG MGMT >30: CPT | Performed by: PHYSICAL MEDICINE & REHABILITATION

## 2023-07-17 PROCEDURE — 99232 SBSQ HOSP IP/OBS MODERATE 35: CPT | Performed by: HOSPITALIST

## 2023-07-17 RX ADMIN — MOMETASONE FUROATE AND FORMOTEROL FUMARATE DIHYDRATE 2 PUFF: 200; 5 AEROSOL RESPIRATORY (INHALATION) at 09:18

## 2023-07-17 RX ADMIN — INSULIN LISPRO 2 UNITS: 100 INJECTION, SOLUTION INTRAVENOUS; SUBCUTANEOUS at 07:38

## 2023-07-17 RX ADMIN — CLOPIDOGREL BISULFATE 75 MG: 75 TABLET ORAL at 08:27

## 2023-07-17 RX ADMIN — LISINOPRIL 10 MG: 5 TABLET ORAL at 05:39

## 2023-07-17 RX ADMIN — LORATADINE 10 MG: 10 TABLET ORAL at 08:27

## 2023-07-17 RX ADMIN — NICOTINE TRANSDERMAL SYSTEM 21 MG: 21 PATCH, EXTENDED RELEASE TRANSDERMAL at 05:39

## 2023-07-17 ASSESSMENT — ENCOUNTER SYMPTOMS
VOMITING: 0
FEVER: 0
NERVOUS/ANXIOUS: 0
NAUSEA: 0
ABDOMINAL PAIN: 0
DIARRHEA: 0
SHORTNESS OF BREATH: 0
CHILLS: 0

## 2023-07-17 NOTE — PROGRESS NOTES
Hospital Medicine Daily Progress Note      Chief Complaint  Hypertension  Diabetes    Interval Problem Update  Pt tolerating Metformin XR so far.  Pt going home today.    Review of Systems  Review of Systems   Constitutional:  Negative for chills and fever.   Respiratory:  Negative for shortness of breath.    Cardiovascular:  Negative for chest pain.   Gastrointestinal:  Negative for abdominal pain, diarrhea, nausea and vomiting.   Psychiatric/Behavioral:  The patient is not nervous/anxious.         Physical Exam  Temp:  [36.5 °C (97.7 °F)-37.1 °C (98.8 °F)] 36.5 °C (97.7 °F)  Pulse:  [81-93] 81  Resp:  [18-20] 18  BP: (126-150)/(66-81) 134/74  SpO2:  [91 %-93 %] 91 %    Physical Exam  Vitals and nursing note reviewed.   Constitutional:       Appearance: Normal appearance.   HENT:      Head: Atraumatic.   Eyes:      Conjunctiva/sclera: Conjunctivae normal.      Pupils: Pupils are equal, round, and reactive to light.   Cardiovascular:      Rate and Rhythm: Normal rate and regular rhythm.      Heart sounds: No murmur heard.  Pulmonary:      Effort: Pulmonary effort is normal.      Breath sounds: No stridor. No wheezing or rales.   Abdominal:      General: There is no distension.      Palpations: Abdomen is soft.      Tenderness: There is no abdominal tenderness.   Musculoskeletal:      Cervical back: Normal range of motion and neck supple.      Right lower leg: No edema.      Left lower leg: No edema.   Skin:     General: Skin is warm and dry.      Findings: No rash.   Neurological:      Mental Status: She is alert and oriented to person, place, and time.   Psychiatric:         Mood and Affect: Mood normal.         Behavior: Behavior normal.         Fluids    Intake/Output Summary (Last 24 hours) at 7/17/2023 0954  Last data filed at 7/17/2023 0900  Gross per 24 hour   Intake 1380 ml   Output --   Net 1380 ml         Laboratory        Recent Labs     07/15/23  0616   SODIUM 139   POTASSIUM 4.3   CHLORIDE 103   CO2 23    GLUCOSE 121*   BUN 12   CREATININE 0.48*   CALCIUM 9.4                     Assessment/Plan  * Ischemic stroke (HCC)- (present on admission)  Assessment & Plan  Cont ASA, Plavix  Cont Lipitor  Has ZioPatch    Hypertension- (present on admission)  Assessment & Plan  BP ok    Cont Lisinopril --> dose increased rescently  Cont to monitor    Type 2 diabetes mellitus without complication, without long-term current use of insulin (HCC)- (present on admission)  Assessment & Plan  Hba1c: 12.6 (7/2)  BS recently: 110-170 (hit 221 x 1)  Cont Metformin: 850 mg bid --> 1000 mg bid (7/13 am) --> XR 1000 mg qhs (7/15)  Pt tolerating Metformin XR recently  Pt may be having GI upset from the Metformin  Note: home meds include Metformin & Glipizide 2.5 mg qam           has not taken her meds for > 8 years because 2nd to not getting regular medical care  Cont to monitor    Tobacco abuse- (present on admission)  Assessment & Plan  On Dulera  RT protocol

## 2023-07-17 NOTE — PROGRESS NOTES
NURSING DAILY NOTE    Name: Nyasia Schuster   Date of Admission: 7/5/2023   Admitting Diagnosis: Ischemic stroke (HCC)  Attending Physician: Unique Black M.d.  Allergies: Patient has no known allergies.    Safety  Patient Assist  SBA  Patient Precautions  Fall Risk  Precaution Comments  zio patch, RUE sling to prevent subluxation  Bed Transfer Status  Modified Independent  Toilet Transfer Status   Standby Assist (ambulate bed<>toilet with SPC)  Assistive Devices  Rails, Wheelchair  Oxygen  None - Room Air  Diet/Therapeutic Dining  Current Diet Order   Procedures    Diet Order Diet: Level 7 - Easy to Chew (meds whole with thins, Please add a T- rocker knife to all plates.); Liquid level: Level 0 - Thin; Second Modifier: (optional): Consistent CHO (Diabetic)     Pill Administration  whole  Agitated Behavioral Scale  14  ABS Level of Severity  No Agitation    Fall Risk  Has the patient had a fall this admission?   No  Deanne Cheng Fall Risk Scoring  12, MODERATE RISK  Fall Risk Safety Measures  bed alarm and chair alarm    Vitals  Temperature: 36.8 °C (98.2 °F)  Temp src: Oral  Pulse: 93  Respiration: 20  Blood Pressure : (!) 150/81  Blood Pressure MAP (Calculated): 104 MM HG  BP Location: Left, Upper Arm  Patient BP Position: Sitting     Oxygen  Pulse Oximetry: 93 %  O2 (LPM): 0  FiO2%: 21 %  O2 Delivery Device: None - Room Air  Incentive Spirometer Volume: 1500 mL    Bowel and Bladder  Last Bowel Movement  07/15/23  Stool Type  Type 7: Watery, no solid pieces-entirely liquid  Bowel Device  Bathroom  Continent  Bladder: Continent void   Bowel: Continent movement  Bladder Function  Urine Void (mL):  (MOderate)  Number of Times Voided: 1  Urinary Options: Yes  Urine Color: Yellow  Number of Times Incontinent of Urine: 0  Genitourinary Assessment   Bladder Assessment (WDL):  WDL Except  Bhatt Catheter: Not Applicable  Urine Color: Yellow  Number of  Bladder Accidents: 0  Total Number of Bladder of Accidents in Last 7 Days: 0  Number of Times Incontinent of Urine: 0  Bladder Device: Bathroom  Bladder Scan: Post Void  $ Bladder Scan Results (mL): 82    Skin  Parveen Score   18  Sensory Interventions   Bed Types: Standard/Trauma Mattress  Skin Preventative Measures: Pillows in Use for Support / Positioning  Moisture Interventions         Pain  Pain Rating Scale  0 - No Pain  Pain Location  Shoulder  Pain Location Orientation  Right, Posterior  Pain Interventions   Declines    ADLs    Bathing   Shower, * * With Assistance from, Staff  Linen Change      Personal Hygiene  Perineal Care (HANDWASHED)  Chlorhexidine Bath      Oral Care  Brushed Teeth  Teeth/Dentures     Shave     Nutrition Percentage Eaten  *  * Meal *  *, Dinner, Between % Consumed  Environmental Precautions  Personal Belongings, Wastebasket, Call Bell etc. in Easy Reach, Treaded Slipper Socks on Patient, Bed in Low Position, Transferred to Stronger Side  Patient Turns/Positioning  Sitting Up in Wheelchair  Patient Turns Assistance/Tolerance  Assistance of One  Bed Positions  Bed Controls On, Bed Locked  Head of Bed Elevated  Self regulated      Psychosocial/Neurologic Assessment  Psychosocial Assessment  Psychosocial (WDL):  Within Defined Limits  Neurologic Assessment  Neuro (WDL): Exceptions to WDL  Level of Consciousness: Alert  Orientation Level: Oriented X4  Cognition: Follows commands, Appropriate attention/concentration, Appropriate safety awareness, Appropriate judgement  Speech: Clear  Pupil Assesment: Yes  R Pupil Size (mm): 3  R Pupil Shape / Description: Round  R Pupil Reaction: Brisk  L Pupil Size (mm): 3  L Pupil Shape / Description: Round  L Pupil Reaction: Brisk  Motor Function/Sensation Assessment: Motor strength  RUE Sensation: Full sensation  Muscle Strength Right Arm: Fair Strength against Gravity but No Resistance  LUE Sensation: Full sensation  Muscle Strength Left Arm: Good  Strength Against Gravity and Moderate Resistance  RLE Sensation: Full sensation  Muscle Strength Right Leg: Good Strength Against Gravity and Moderate Resistance  LLE Sensation: Full sensation  Muscle Strength Left Leg: Good Strength Against Gravity and Moderate Resistance  EENT (WDL):  WDL Except    Cardio/Pulmonary Assessment  Edema      Respiratory Breath Sounds  RUL Breath Sounds: Clear  RML Breath Sounds: Clear  RLL Breath Sounds: Diminished  OPAL Breath Sounds: Clear  LLL Breath Sounds: Diminished  Cardiac Assessment   Cardiac (WDL):  WDL Except (monicaopajose)

## 2023-07-17 NOTE — PROGRESS NOTES
Patient discharged to home per order.  Discharge instructions reviewed with patient and sister; they verbalize understanding and signed copies placed in chart.  Patient has all belongings; signed copy of form in chart.  Patient left facility at 1013 via wheelchair accompanied by rehab staff and sister.  Have enjoyed working with this pleasant patient.

## 2023-07-17 NOTE — DISCHARGE SUMMARY
"Admission Date: 7/5/2023    Discharge Date: 7/17/2023    Attending Provider: Unique Black MD    Admission Diagnosis:   Active Hospital Problems    Diagnosis     *Ischemic stroke (HCC)     Hypertension     Seasonal allergies     Chronic cough     Tobacco abuse     Type 2 diabetes mellitus without complication, without long-term current use of insulin (HCC)        Discharge Diagnosis:  Active Hospital Problems    Diagnosis     *Ischemic stroke (HCC)     Hypertension     Seasonal allergies     Chronic cough     Tobacco abuse     Type 2 diabetes mellitus without complication, without long-term current use of insulin (HCC)        HPI per H&P:    Per Dr. Roman's consult, \"The patient is a 67 y.o. female with a past medical history of DM (non compliant on metformin) and tobacco use;  who presented on 7/2/2023  7:18 AM with slurred speech and R sided weakness. Per documentation, patient had noticed a change in her speech about 2 days prior to presentation to ED. Patient's sister noted that the patient's speech sounded different about two days ago, when patient had new onset right sided weakness, they presented to the ED. Upon eval in the ED, CT head showed chronic microvascular ischemic type changes. CTA NECK negative for acute findings. CTA head showed moderate stenosis of the junction of M1/M2 segment of the L MCA  Neuro consulted,  Patient was started on ASA, plavix, and statin. long-term BP goal is 110-130/60-80. ECHO showed EF of 70%. MRI brain is pending.\"     Since initial consult, MRI completed which showed left MCA stroke. HgbA1c 12.6, LDL 90, ECHO EF 75 %.     Patient currently reports right-sided weakness, mostly in her arm and hand.  She is right-hand dominant.  She denies any changes in her vision, no numbness or tingling, denies any pain.  She reports a chronic cough as well as postnasal drip and nasal congestion.  She is reporting gas which she thinks is associated with stool softeners/laxatives.  For some " mild urinary retention with a postvoid residual this afternoon of 140 cc. Patient was not on oxygen at home.      Patient was evaluated by Rehab Medicine physician and Physical Therapy, Occupational Therapy, and Speech Therapy and determined to be appropriate for acute inpatient rehab and was transferred to [facility] on 7/5/2023 12:28 PM.    Rehab Hospital Course by Problem List:    Left MCA stroke  Right hemiparesis  Completed aspirin and Plavix for 10 days  Continue Plavix and atorvastatin  Patient has cardiac monitor in place, to be returned 7/19, after discharge     Right shoulder pain, acute on chronic  Exam suggestive of rotator cuff tendinitis/bursitis  X-rays without acute abnormalities     Diabetes with hyperglycemia  Continue metformin  Continue sliding scale insulin only as an inpatient     Tobacco abuse  Continue nicotine patch and gum  Counseling provided 7/13     Hypertension  Continue lisinopril     Upper back pain, resolved  Discontinue Lidoderm patches     Oral thrush, resolved  Completed nystatin     Seasonal allergies  Continue Claritin     Respiratory failure with hypoxia  Likely due to undiagnosed COPD  Continue Dulera  Titrated off oxygen     Abdominal gas, resolved  Completed simethicone     Constipation, resolved  Loose stools, improved  Bowel medications discontinued  Last bowel movement 7/15     DVT prophylaxis  Discontinue Lovenox as patient is walking long distances    Functional Status at Discharge  Eating:  Supervision  Eating Description:  Set-up of equipment or meal/tube feeding (See note for self-feeding eval details.)  Grooming:  Supervision, Standing (wash hands at sink)  Grooming Description:  Adaptive equipment, Increased time, Seated in wheelchair at sink, Supervision for safety (Able to open toothpaste on own)  Bathing:  Modified Independent  Bathing Description:  Grab bar, Hand held shower, Tub bench, Set up for wound protection, Supervision for  safety  Upper Body Dressing:  Modified Independent  Upper Body Dressing Description:  Increased time, Supervision for safety (using heather techniques)  Lower Body Dressing:  Modified Independent  Lower Body Dressing Description:  Increased time, Supervision for safety  Discharge Location : Home  Patient Discharging with Assist of: Family  (Sister)  Level of Supervision Required: Intermittent Supervision  Recommended Equipment for Discharge: Quad Cane;Shower Chair;Grab Bars by Toilet;Grab Bars in Tub / Shower;Hand Held Shower Head  Recommended Services Upon Discharge: Home Health Occupational Therapy  Long Term Goals Met: 3 - pt met goals for ADL's, bathroom transfers, and home mgmt tasks at supervision to Mod I level  Long Term Goals Not Met: 0  Criteria for Termination of Services: Maximum Function Achieved for Inpatient Rehabilitation  Walk:  Modified Independent  Distance Walked:  250  Number of Times Distance Was Traveled:  1  Assistive Device:  Quad Cane  Gait Deviation:  Decreased Base Of Support, Step To  Wheelchair:  Modified Independent  Distance Propelled:  50   Wheelchair Description:  Extra time  Stairs Supervised  Stairs Description Hand rails, Extra time, Supervision for safety  Discharge Location: Home  Patient Discharging with Assist of: Family  Level of Supervision Required Upon Discharge: Intermittent Supervision  Recommended Equipment for Discharge: Quad Cane;Reacher  Recommeded Services Upon Discharge: Home Health Physical Therapy  Long Term Goals Met: 4  Long Term Goals Not Met: 0  Criteria for Termination of Services: Maximum Function Achieved for Inpatient Rehabilitation  Comprehension:  Modified Independent  Comprehension Description:  Glasses  Expression:  Independent  Expression Description:     Social Interaction:  Independent  Social Interaction Description:     Problem Solving:  Modified Independent  Problem Solving Description:  Increased time  Memory:  Supervision  Memory Description:   Verbal cueing, Therapy schedule       I, Unique Black M.D., personally performed a complete drug regimen review and no potential clinically significant medication issues were identified.     Discharge Medication:     Medication List        START taking these medications        Instructions   budesonide-formoterol 160-4.5 MCG/ACT Aero  Commonly known as: Symbicort   Doctor's comments: Generic preferred  Inhale 2 Puffs 2 times a day.  Dose: 2 Puff     lisinopril 10 MG Tabs  Commonly known as: Prinivil   Doctor's comments: Generic preferred  Take 1 Tablet by mouth every day.  Dose: 10 mg     metFORMIN  MG Tb24  Commonly known as: Glucophage XR   Doctor's comments: Generic preferred  Take 2 Tablets by mouth with dinner.  Dose: 1,000 mg     simvastatin 40 MG Tabs  Commonly known as: Zocor   Doctor's comments: Generic preferred  Take 1 Tablet by mouth every evening.  Dose: 40 mg            CONTINUE taking these medications        Instructions   clopidogrel 75 MG Tabs  Commonly known as: Plavix   Doctor's comments: Generic preferred  Take 1 Tablet by mouth every day.  Dose: 75 mg     fexofenadine 180 MG tablet  Commonly known as: Allegra   Take 180 mg by mouth every day.  Dose: 180 mg            STOP taking these medications      acetaminophen 325 MG Tabs  Commonly known as: Tylenol     aspirin 81 MG EC tablet     atorvastatin 40 MG Tabs  Commonly known as: Lipitor     insulin GLARGINE 100 UNIT/ML Soln  Commonly known as: Lantus,Semglee     insulin lispro 100 UNIT/ML Sopn injection PEN  Commonly known as: HumaLOG/AdmeLOG     nicotine 21 MG/24HR Pt24  Commonly known as: Nicoderm     nicotine polacrilex 2 MG Gum  Commonly known as: Nicorette              Discharge Diet:  Diabetic    Discharge Activity:  As tolerated      Disposition:  Patient to discharge home with family support and community resources.     Equipment:  Follow-up & Discharge Instructions:  HH: PT/OT/RN     Equip: shower bench, small based quad  cane     Follow-ups: PCP -patient does not have wounds and needs 1, stroke bridge clinic, Dr. Haines     Rx: [facility] as patient does not have prescription coverage     Condition on Discharge:  Good    More than 35 minutes was spent on discharging this patient, including face-to-face time, prescription management, and the dictation of this note.    Unique Black M.D.    Date of Service: 7/17/2023

## 2023-07-17 NOTE — CARE PLAN
"The patient is Stable - Low risk of patient condition declining or worsening      Problem: Fall Risk - Rehab  Goal: Patient will remain free from falls  7/17/2023 0315 by Suzan Valadez R.N.  Outcome: Progressing     Deanne Cheng Fall risk Assessment Score: 12      Moderate fall risk Interventions  - Bed and strip alarm   - Yellow sign by the door   - Yellow wrist band \"Fall risk\"  - Room near to the nurse station  - Do not leave patient unattended in the bathroom  - Fall risk education provided      Problem: Skin Integrity  Goal: Patient's skin integrity will be maintained or improve  7/17/2023 0315 by Suzan Valadez RMELBA.  Outcome: Progressing  Patient's skin remains intact and free from new or accidental injury this shift.  Will continue to monitor.                "

## 2023-07-21 ENCOUNTER — TELEPHONE (OUTPATIENT)
Dept: CARDIOLOGY | Facility: MEDICAL CENTER | Age: 68
End: 2023-07-21
Payer: MEDICARE

## 2023-07-21 DIAGNOSIS — I63.9 CEREBROVASCULAR ACCIDENT (CVA), UNSPECIFIED MECHANISM (HCC): ICD-10-CM

## 2023-07-21 PROCEDURE — 93248 EXT ECG>7D<15D REV&INTERPJ: CPT | Performed by: INTERNAL MEDICINE

## 2023-07-28 ENCOUNTER — OFFICE VISIT (OUTPATIENT)
Dept: MEDICAL GROUP | Facility: MEDICAL CENTER | Age: 68
End: 2023-07-28
Payer: MEDICARE

## 2023-07-28 VITALS
WEIGHT: 141.8 LBS | TEMPERATURE: 98.1 F | SYSTOLIC BLOOD PRESSURE: 134 MMHG | OXYGEN SATURATION: 97 % | DIASTOLIC BLOOD PRESSURE: 76 MMHG | HEIGHT: 60 IN | HEART RATE: 94 BPM | BODY MASS INDEX: 27.84 KG/M2

## 2023-07-28 DIAGNOSIS — I63.9 CEREBROVASCULAR ACCIDENT (CVA), UNSPECIFIED MECHANISM (HCC): ICD-10-CM

## 2023-07-28 DIAGNOSIS — Z72.0 TOBACCO ABUSE: ICD-10-CM

## 2023-07-28 DIAGNOSIS — J30.2 SEASONAL ALLERGIES: ICD-10-CM

## 2023-07-28 DIAGNOSIS — I63.9 ISCHEMIC STROKE (HCC): ICD-10-CM

## 2023-07-28 DIAGNOSIS — E11.9 TYPE 2 DIABETES MELLITUS WITHOUT COMPLICATION, WITHOUT LONG-TERM CURRENT USE OF INSULIN (HCC): ICD-10-CM

## 2023-07-28 PROCEDURE — 3078F DIAST BP <80 MM HG: CPT | Performed by: STUDENT IN AN ORGANIZED HEALTH CARE EDUCATION/TRAINING PROGRAM

## 2023-07-28 PROCEDURE — 3075F SYST BP GE 130 - 139MM HG: CPT | Performed by: STUDENT IN AN ORGANIZED HEALTH CARE EDUCATION/TRAINING PROGRAM

## 2023-07-28 PROCEDURE — 99204 OFFICE O/P NEW MOD 45 MIN: CPT | Performed by: STUDENT IN AN ORGANIZED HEALTH CARE EDUCATION/TRAINING PROGRAM

## 2023-07-28 RX ORDER — LISINOPRIL 20 MG/1
20 TABLET ORAL DAILY
Qty: 90 TABLET | Refills: 3 | Status: SHIPPED | OUTPATIENT
Start: 2023-07-28 | End: 2023-08-23 | Stop reason: SDUPTHER

## 2023-07-28 RX ORDER — GLUCOSAMINE HCL/CHONDROITIN SU 500-400 MG
CAPSULE ORAL
Qty: 100 EACH | Refills: 0 | Status: SHIPPED | OUTPATIENT
Start: 2023-07-28 | End: 2023-08-23

## 2023-07-28 RX ORDER — CLOPIDOGREL BISULFATE 75 MG/1
75 TABLET ORAL DAILY
Qty: 90 TABLET | Refills: 3 | Status: SHIPPED | OUTPATIENT
Start: 2023-07-28 | End: 2023-07-28

## 2023-07-28 RX ORDER — METFORMIN HYDROCHLORIDE 500 MG/1
1000 TABLET, EXTENDED RELEASE ORAL
Qty: 180 TABLET | Refills: 3 | Status: SHIPPED | OUTPATIENT
Start: 2023-07-28

## 2023-07-28 RX ORDER — METFORMIN HYDROCHLORIDE 500 MG/1
1000 TABLET, EXTENDED RELEASE ORAL
Qty: 180 TABLET | Refills: 3 | Status: SHIPPED | OUTPATIENT
Start: 2023-07-28 | End: 2023-07-28

## 2023-07-28 RX ORDER — LANCETS 30 GAUGE
EACH MISCELLANEOUS
Qty: 100 EACH | Refills: 0 | Status: SHIPPED | OUTPATIENT
Start: 2023-07-28 | End: 2023-07-28

## 2023-07-28 RX ORDER — SIMVASTATIN 40 MG
40 TABLET ORAL NIGHTLY
Qty: 90 TABLET | Refills: 3 | Status: SHIPPED | OUTPATIENT
Start: 2023-07-28

## 2023-07-28 RX ORDER — LANCETS 30 GAUGE
EACH MISCELLANEOUS
Qty: 100 EACH | Refills: 0 | Status: SHIPPED | OUTPATIENT
Start: 2023-07-28 | End: 2023-08-23

## 2023-07-28 RX ORDER — LISINOPRIL 20 MG/1
20 TABLET ORAL DAILY
Qty: 90 TABLET | Refills: 3 | Status: SHIPPED | OUTPATIENT
Start: 2023-07-28 | End: 2023-07-28

## 2023-07-28 RX ORDER — CLOPIDOGREL BISULFATE 75 MG/1
75 TABLET ORAL DAILY
Qty: 90 TABLET | Refills: 3 | Status: SHIPPED | OUTPATIENT
Start: 2023-07-28

## 2023-07-28 RX ORDER — FLUTICASONE PROPIONATE 50 MCG
1 SPRAY, SUSPENSION (ML) NASAL DAILY
Qty: 16 G | Refills: 0 | Status: SHIPPED | OUTPATIENT
Start: 2023-07-28 | End: 2023-08-23

## 2023-07-28 RX ORDER — SIMVASTATIN 40 MG
40 TABLET ORAL NIGHTLY
Qty: 90 TABLET | Refills: 3 | Status: SHIPPED | OUTPATIENT
Start: 2023-07-28 | End: 2023-07-28

## 2023-07-28 ASSESSMENT — ENCOUNTER SYMPTOMS
DIZZINESS: 0
PALPITATIONS: 0
WEIGHT LOSS: 0
HEADACHES: 0
FEVER: 0
WHEEZING: 0
NAUSEA: 0
SHORTNESS OF BREATH: 0
VOMITING: 0
CHILLS: 0

## 2023-07-28 ASSESSMENT — PATIENT HEALTH QUESTIONNAIRE - PHQ9: CLINICAL INTERPRETATION OF PHQ2 SCORE: 0

## 2023-07-28 ASSESSMENT — FIBROSIS 4 INDEX: FIB4 SCORE: 2

## 2023-07-28 NOTE — PROGRESS NOTES
Subjective:     CC:  Diagnoses of Type 2 diabetes mellitus without complication, without long-term current use of insulin (HCC), Cerebrovascular accident (CVA), unspecified mechanism (HCC), Ischemic stroke (HCC), and Seasonal allergies were pertinent to this visit.    HISTORY OF THE PRESENT ILLNESS: Patient is a 67 y.o. female. This pleasant patient is here today to establish care and discuss     Patient recently admitted to the hospital for left MCA stroke.  Was on aspirin and Plavix for 10 days transition to Plavix initially on atorvastatin was switched to simvastatin 40 mg daily.  8 A1c in the hospitalization was 16.6.  Patient was on metformin 1 g nightly and insulin sliding scale weight while in rehab and discharged with metformin 1 g.      She reports she is unable to tolerate higher doses of metformin and it wipes her out.  We will have patient monitor her blood sugar and follow-up in 1 week to decide appropriate medical therapy.  She reports she has previously has been on glipizide 2.5 mg twice daily.  She reports she has financial concerns and does not have Medicare part D    Medication reviewed with patient 1 year of supply of Plavix, lisinopril, metformin 1 g, simvastatin was sent to her pharmacy of choice    Patient does report history of allergic rhinitis currently taking fexofenadine would recommend fluticasone nasal spray but she is concerned that this may be a bacterial sinusitis however currently does not display signs and symptoms of bacterial sinusitis.    Chart review review 7/5/2023-7/17/2023  Dx: ischemic stroke, HTN,, T2DM   Left MCA stroke, started on Plavix and statin, goal blood pressure 110s to 130  Zio patch showed sinus rhythm without evidence of atrial fibrillation  Echo without right to left shunt    No problems updated.    Health Maintenance:     ROS:   Review of Systems   Constitutional:  Negative for chills, fever and weight loss.   HENT:  Negative for hearing loss.    Respiratory:   Negative for shortness of breath and wheezing.    Cardiovascular:  Negative for chest pain and palpitations.   Gastrointestinal:  Negative for nausea and vomiting.   Genitourinary:  Negative for frequency and urgency.   Skin:  Negative for rash.   Neurological:  Negative for dizziness and headaches.         Objective:       Exam: /76   Pulse 94   Temp 36.7 °C (98.1 °F) (Temporal)   Ht 1.524 m (5')   Wt 64.3 kg (141 lb 12.8 oz)   SpO2 97%  Body mass index is 27.69 kg/m².    Physical Exam  Constitutional:       Appearance: Normal appearance.   Cardiovascular:      Rate and Rhythm: Normal rate and regular rhythm.   Pulmonary:      Effort: Pulmonary effort is normal.      Breath sounds: Normal breath sounds.   Neurological:      Mental Status: She is alert.         Labs:     Assessment & Plan:   67 y.o. female with the following -    1. Ischemic stroke (HCC)  2. Cerebrovascular accident (CVA), unspecified mechanism (HCC)  Acute condition that is stable  Patient left MCA stroke on Plavix and simvastatin 40 mg daily without adverse effect  Lisinopril increased to 20 mg for better blood pressure control.  Currently on metformin 1 g daily started for A1c of 12 noted 3 weeks ago.  Patient has home health  Patient was called neurology is pending stroke Bridge clinic follow-up  Patient is followed cardiology for to review Zio  - lisinopril (PRINIVIL) 20 MG Tab; Take 1 Tablet by mouth every day.  Dispense: 90 Tablet; Refill: 3  - metFORMIN ER (GLUCOPHAGE XR) 500 MG TABLET SR 24 HR; Take 2 Tablets by mouth with dinner.  Dispense: 180 Tablet; Refill: 3  - simvastatin (ZOCOR) 40 MG Tab; Take 1 Tablet by mouth every evening.  Dispense: 90 Tablet; Refill: 3    3. Tobacco abuse  Chronic, stable  Patient not interested in quitting at this time  Patient declined prescription of nicotine replacement therapy    4. Type 2 diabetes mellitus without complication, without long-term current use of insulin (HCC)  Chronic,  "stable  Patient with A1c of 12.6 started on metformin 1 g nightly report unable to tolerate higher dose \"wiped me out\".  We will manage conservatively well asked patient to monitor her blood sugar for the next 1 week and adjust medication appropriately  Patient did mention she does not have Medicare part B and would like to only be placed on generic medications May consider glipizide 2.5 mg twice daily  May consider long-acting insulin to control fasting blood sugar  - Referral to Pharmacotherapy Service    5. Seasonal allergies  Chronic seasonal allergies takes Allegra report has chronic postnasal drip request for antibiotics discussed with patient to try fluticasone for 1 week we may consider trial of antibiotics if her symptom persists or worsens.  - fluticasone (FLONASE) 50 MCG/ACT nasal spray; Administer 1 Spray into affected nostril(S) every day.  Dispense: 16 g; Refill: 0        Return in about 1 week (around 8/4/2023) for Diabetes + log.    Please note that this dictation was created using voice recognition software. I have made every reasonable attempt to correct obvious errors, but I expect that there are errors of grammar and possibly content that I did not discover before finalizing the note.  "

## 2023-08-02 ENCOUNTER — TELEPHONE (OUTPATIENT)
Dept: VASCULAR LAB | Facility: MEDICAL CENTER | Age: 68
End: 2023-08-02
Payer: MEDICARE

## 2023-08-02 NOTE — TELEPHONE ENCOUNTER
[facility]    Received pharmacotherapy referral for DM from Dr. Frank on 7/28/23    Called pt to schedule appt to establish care - no answer. LVM.    Insurance: Medicare  PCP: [facility]  Locations to be seen: Any    [facility] Anticoagulation/Pharmacotherapy Clinic at [phone], fax [phone]    Farzad Aranda, DoryD, BCACP

## 2023-08-04 ENCOUNTER — OFFICE VISIT (OUTPATIENT)
Dept: MEDICAL GROUP | Facility: MEDICAL CENTER | Age: 68
End: 2023-08-04
Payer: MEDICARE

## 2023-08-04 VITALS
OXYGEN SATURATION: 95 % | DIASTOLIC BLOOD PRESSURE: 66 MMHG | TEMPERATURE: 98.4 F | WEIGHT: 141.6 LBS | BODY MASS INDEX: 27.8 KG/M2 | HEIGHT: 60 IN | HEART RATE: 104 BPM | RESPIRATION RATE: 16 BRPM | SYSTOLIC BLOOD PRESSURE: 138 MMHG

## 2023-08-04 DIAGNOSIS — E11.9 TYPE 2 DIABETES MELLITUS WITHOUT COMPLICATION, WITHOUT LONG-TERM CURRENT USE OF INSULIN (HCC): ICD-10-CM

## 2023-08-04 DIAGNOSIS — I10 PRIMARY HYPERTENSION: ICD-10-CM

## 2023-08-04 PROCEDURE — 99214 OFFICE O/P EST MOD 30 MIN: CPT | Performed by: STUDENT IN AN ORGANIZED HEALTH CARE EDUCATION/TRAINING PROGRAM

## 2023-08-04 PROCEDURE — 92250 FUNDUS PHOTOGRAPHY W/I&R: CPT | Mod: TC | Performed by: STUDENT IN AN ORGANIZED HEALTH CARE EDUCATION/TRAINING PROGRAM

## 2023-08-04 PROCEDURE — 3075F SYST BP GE 130 - 139MM HG: CPT | Performed by: STUDENT IN AN ORGANIZED HEALTH CARE EDUCATION/TRAINING PROGRAM

## 2023-08-04 PROCEDURE — 3078F DIAST BP <80 MM HG: CPT | Performed by: STUDENT IN AN ORGANIZED HEALTH CARE EDUCATION/TRAINING PROGRAM

## 2023-08-04 RX ORDER — BLOOD-GLUCOSE METER
KIT MISCELLANEOUS
COMMUNITY
Start: 2023-07-28 | End: 2023-08-23

## 2023-08-04 RX ORDER — GLIPIZIDE 2.5 MG/1
2.5 TABLET, EXTENDED RELEASE ORAL DAILY
Qty: 90 TABLET | Refills: 3 | Status: SHIPPED | OUTPATIENT
Start: 2023-08-04

## 2023-08-04 RX ORDER — BLOOD SUGAR DIAGNOSTIC
STRIP MISCELLANEOUS
COMMUNITY
Start: 2023-07-28 | End: 2023-08-23

## 2023-08-04 ASSESSMENT — ENCOUNTER SYMPTOMS
HEADACHES: 0
SHORTNESS OF BREATH: 0
NAUSEA: 0
WHEEZING: 0
DIZZINESS: 0
WEIGHT LOSS: 0
VOMITING: 0
CHILLS: 0
PALPITATIONS: 0
FEVER: 0

## 2023-08-04 ASSESSMENT — FIBROSIS 4 INDEX: FIB4 SCORE: 2

## 2023-08-04 NOTE — PROGRESS NOTES
Subjective:     CC: T2DM    HPI:   Nyasia presents today with    Patient presents for 1 week follow-up for diabetes and measurement of blood sugar  Overall patient has tolerated metformin 500 mg twice daily and lisinopril 20 mg without issues  Problem   Hypertension    Recently started lisinopril 20 mg daily blood pressure remains similar compared to last visit we will continue to monitor     Type 2 Diabetes Mellitus Without Complication, Without Long-Term Current Use of Insulin (Hcc)    Type 2 diabetes recently diagnosed during stroke A1c of 12.6.  Without neuropathy in bilateral lower extremity on monofilament exam.  She cannot tolerate metformin dose greater than 500 mg twice daily.  patient blood sugar range from 170-200 fasting blood sugar and Premeal blood sugar.    Reports sensitivity to medication we will start glipizide 2.5 mg patient to monitor her blood sugar and continue to eat a healthier diet low in sugar.  We will recheck A1c in her next visit    She would like to avoid needles and would prefer medication that is affordable         Health Maintenance:     ROS:  Review of Systems   Constitutional:  Negative for chills, fever and weight loss.   HENT:  Negative for hearing loss.    Respiratory:  Negative for shortness of breath and wheezing.    Cardiovascular:  Negative for chest pain and palpitations.   Gastrointestinal:  Negative for nausea and vomiting.   Genitourinary:  Negative for frequency and urgency.   Skin:  Negative for rash.   Neurological:  Negative for dizziness and headaches.       Objective:     Exam:  /66 (BP Location: Right arm, Patient Position: Sitting, BP Cuff Size: Adult)   Pulse (!) 104   Temp 36.9 °C (98.4 °F) (Temporal)   Resp 16   Ht 1.524 m (5')   Wt 64.2 kg (141 lb 9.6 oz)   SpO2 95%   BMI 27.65 kg/m²  Body mass index is 27.65 kg/m².    Physical Exam  Constitutional:       Appearance: Normal appearance.   Cardiovascular:      Rate and Rhythm: Normal rate and  regular rhythm.   Pulmonary:      Effort: Pulmonary effort is normal.      Breath sounds: Normal breath sounds.   Musculoskeletal:      Cervical back: Normal range of motion and neck supple.   Skin:     Comments: Diabetic foot exam: No lesions or calluses noted. 2+ pedal pulses. Sensation intact with 10 out of 10 on monofilament test.     Neurological:      Mental Status: She is alert.           Labs:     Assessment & Plan:     67 y.o. female with the following -     1. Type 2 diabetes mellitus without complication, without long-term current use of insulin ([facility])  Chronic, stable  Type 2 diabetes recently diagnosed A1c of 12.6  Patient has been controlling her diet home blood pressure on metformin 500 mg twice daily range from 1  correlates with A1c of around 9.  We will start glipizide 2.5 mg daily   Plan  - POCT Retinal Eye Exam  - glipiZIDE SR (GLUCOTROL) 2.5 MG TABLET SR 24 HR; Take 1 Tablet by mouth every day.  Dispense: 90 Tablet; Refill: 3  - Diabetic Monofilament LE Exam    2. Primary hypertension  Chronic, stable  Lisinopril was recently increased to 20 mg daily patient tolerating dose without adverse effect  May consider increasing dose if patient's blood pressure remains elevated next visit            Return in about 6 weeks (around 9/15/2023) for Lab review, Diabetes+A1c.    Please note that this dictation was created using voice recognition software. I have made every reasonable attempt to correct obvious errors, but I expect that there are errors of grammar and possibly content that I did not discover before finalizing the note.

## 2023-08-09 ENCOUNTER — TELEPHONE (OUTPATIENT)
Dept: VASCULAR LAB | Facility: MEDICAL CENTER | Age: 68
End: 2023-08-09
Payer: MEDICARE

## 2023-08-09 NOTE — TELEPHONE ENCOUNTER
[facility] Pharmac[facility]     Received pharmacotherapy referral for DM from Dr. Frank on 7/28/23     Called pt to schedule appt to establish care - no answer. LVM.    2nd call.     Insurance: Medicare  PCP: [facility]  Locations to be seen: Any     [facility] Anticoagulation/Pharmacotherapy Clinic at [phone], fax [phone]     Farzad Aranda, DoryD, BCACP

## 2023-08-14 ENCOUNTER — TELEPHONE (OUTPATIENT)
Dept: CARDIOLOGY | Facility: MEDICAL CENTER | Age: 68
End: 2023-08-14
Payer: MEDICARE

## 2023-08-16 ENCOUNTER — DOCUMENTATION (OUTPATIENT)
Dept: VASCULAR LAB | Facility: MEDICAL CENTER | Age: 68
End: 2023-08-16
Payer: MEDICARE

## 2023-08-16 NOTE — PROGRESS NOTES
[facility] Pharmac[facility]     Received pharmacotherapy referral for DM from Dr. Frank on 7/28/23     Called pt to schedule appt to establish care - no answer. LVM.     3rdd call.     Insurance: Medicare  PCP: [facility]  Locations to be seen: Any     [facility] Anticoagulation/Pharmacotherapy Clinic at [phone], fax [phone]    Cas Weir, DoryD, BCACP

## 2023-08-23 ENCOUNTER — OFFICE VISIT (OUTPATIENT)
Dept: CARDIOLOGY | Facility: MEDICAL CENTER | Age: 68
End: 2023-08-23
Attending: INTERNAL MEDICINE
Payer: MEDICARE

## 2023-08-23 ENCOUNTER — TELEPHONE (OUTPATIENT)
Dept: VASCULAR LAB | Facility: MEDICAL CENTER | Age: 68
End: 2023-08-23
Payer: MEDICARE

## 2023-08-23 VITALS
HEART RATE: 101 BPM | BODY MASS INDEX: 28.23 KG/M2 | RESPIRATION RATE: 18 BRPM | HEIGHT: 60 IN | DIASTOLIC BLOOD PRESSURE: 70 MMHG | SYSTOLIC BLOOD PRESSURE: 142 MMHG | WEIGHT: 143.8 LBS | OXYGEN SATURATION: 94 %

## 2023-08-23 DIAGNOSIS — Z72.0 TOBACCO ABUSE: ICD-10-CM

## 2023-08-23 DIAGNOSIS — I63.9 CEREBROVASCULAR ACCIDENT (CVA), UNSPECIFIED MECHANISM (HCC): ICD-10-CM

## 2023-08-23 DIAGNOSIS — I63.9 ISCHEMIC STROKE (HCC): ICD-10-CM

## 2023-08-23 DIAGNOSIS — I10 PRIMARY HYPERTENSION: ICD-10-CM

## 2023-08-23 PROCEDURE — 3077F SYST BP >= 140 MM HG: CPT | Performed by: INTERNAL MEDICINE

## 2023-08-23 PROCEDURE — 99204 OFFICE O/P NEW MOD 45 MIN: CPT | Performed by: INTERNAL MEDICINE

## 2023-08-23 PROCEDURE — 99212 OFFICE O/P EST SF 10 MIN: CPT | Performed by: INTERNAL MEDICINE

## 2023-08-23 PROCEDURE — 3078F DIAST BP <80 MM HG: CPT | Performed by: INTERNAL MEDICINE

## 2023-08-23 RX ORDER — LISINOPRIL 40 MG/1
40 TABLET ORAL DAILY
Qty: 90 TABLET | Refills: 3 | Status: SHIPPED | OUTPATIENT
Start: 2023-08-23 | End: 2023-11-10

## 2023-08-23 ASSESSMENT — FIBROSIS 4 INDEX: FIB4 SCORE: 2

## 2023-08-23 NOTE — ASSESSMENT & PLAN NOTE
At this time her blood pressure is poorly controlled given her recent CVA.  I have elected to increase her lisinopril to 20 mg daily and she will monitor her blood pressure on a daily basis.  She will work closely with her primary care provider as well as her cardiologist to maintain well-controlled blood pressure.  I suspect that this is contributing to her multiple small infarcts that were seen on her MRI and will also focus in on continuation of her statin therapy.

## 2023-08-23 NOTE — TELEPHONE ENCOUNTER
[facility] Pharmac[facility]     Received pharmacotherapy referral for DM from Dr. Frank on 7/28/23     Called pt to schedule appt to establish care - no answer. LVM.     4th call.    Sent letter.     Insurance: Medicare  PCP: [facility]  Locations to be seen: Any     [facility] Anticoagulation/Pharmacotherapy Clinic at [phone], fax [phone]     Farzad Aranda, DoryD, BCACP

## 2023-08-23 NOTE — ASSESSMENT & PLAN NOTE
Clinically, she continues to regain function after having a noted ischemic stroke back in early July 2023.  At this time she will continue to focus in on secondary prevention with ongoing Plavix 75 mg daily coupled to lisinopril which will be increased to 40 mg daily.  Her blood pressure is not currently well controlled and medication changes are made at this time.  She will work closely with her primary care provider regarding ongoing management of her type 2 diabetes mellitus which was poorly controlled at presentation of her stroke.  She is not actively checking her blood sugars on a daily basis.

## 2023-08-23 NOTE — ASSESSMENT & PLAN NOTE
Regarding her ongoing tobacco use.  Extensive discussion was had about the negative cardiovascular impacts of ongoing tobacco exposure.  Patient states that she has no plans to ever quit smoking.

## 2023-08-23 NOTE — LETTER
[address] [place] [phone]        Dear Nyasia Schuster ,    We have been unsuccessful in our attempts to contact you regarding your Diabetes Clinical Pharmacist referral.  Please contact us if you would like to schedule an appointment for help managing your blood sugar.    Please contact our clinic so we may assist you.  We are open Monday-Friday 8 am until 5 pm.  You may reach our Service at [phone].        Sincerely,    Stanley Chavez PharmD, [facility]  Clinic Supervisor  [facility]  Outpatient Anticoagulation Service

## 2023-08-23 NOTE — PROGRESS NOTES
[facility]    PatientName:Nyasia ThomasenzoniDate: 2023  :1955    67 y.o.PCP:Morales Frank M.D.  MRN:[id number]          Problems and Plans    Ischemic stroke (HCC)  Clinically, she continues to regain function after having a noted ischemic stroke back in early 2023.  At this time she will continue to focus in on secondary prevention with ongoing Plavix 75 mg daily coupled to lisinopril which will be increased to 40 mg daily.  Her blood pressure is not currently well controlled and medication changes are made at this time.  She will work closely with her primary care provider regarding ongoing management of her type 2 diabetes mellitus which was poorly controlled at presentation of her stroke.  She is not actively checking her blood sugars on a daily basis.    Tobacco abuse  Regarding her ongoing tobacco use.  Extensive discussion was had about the negative cardiovascular impacts of ongoing tobacco exposure.  Patient states that she has no plans to ever quit smoking.    Hypertension  At this time her blood pressure is poorly controlled given her recent CVA.  I have elected to increase her lisinopril to 20 mg daily and she will monitor her blood pressure on a daily basis.  She will work closely with her primary care provider as well as her cardiologist to maintain well-controlled blood pressure.  I suspect that this is contributing to her multiple small infarcts that were seen on her MRI and will also focus in on continuation of her statin therapy.    Return in about 3 months (around 2023).      Encounter    Reason for Visit / Chief Complaint: Ischemic CVA on 2023    HPI    67-year-old female with ongoing tobacco dependency, type 2 diabetes mellitus initially poorly controlled at hospital admission, ischemic stroke, hypertension presents in consultation for evaluation of hypertension and prior ischemic CVA    Currently, she notes that she is  feeling well and continues to recover from her recent CVA involving the left middle cerebral artery with noted stenosis.  She also has evidence of multiple infarcts in the posterior frontal centrum synovial side.  She notes that she is steadily regaining function and that her only deficit at this time is her right  strength.  She was not able to raise her arm above her shoulder and states he has since regained function.  She has started to to smoke and is upwards of a pack per day    Echocardiogram performed on 7/3/2023 demonstrated normal LV cavity size with preserved LV systolic function estimate ejection fraction of 65 to 70% with mild concentric left ventricular hypertrophy mildly elevated RV systolic pressure estimated at 30 mmHg no gross evidence of a left ventricular thrombus.    Zio patch AT performed on 7/5 to 7/19/2023 demonstrated predominantly sinus rhythm during examination with no evidence of ventricular tachycardia no evidence of atrial fibrillation or supraventricular tachycardia no significant pauses or high degree AV block rare PACs and rare PVCs.    Past Medical History  Past Medical History:   Diagnosis Date    Carpal tunnel syndrome on both sides     Diabetes (HCC)     Finger injury     3 yr old    Sinusitis     Tobacco abuse      Past Surgical History  Past Surgical History:   Procedure Laterality Date    CARPAL TUNNEL RELEASE Bilateral     OTHER SURGICAL PROCEDURE      nasal septum repair    SINUSCOPY       Social History  Social History     Socioeconomic History    Marital status: Single     Spouse name: Not on file    Number of children: Not on file    Years of education: Not on file    Highest education level: Not on file   Occupational History    Not on file   Tobacco Use    Smoking status: Every Day     Current packs/day: 2.00     Types: Cigarettes    Smokeless tobacco: Never   Vaping Use    Vaping Use: Never used   Substance and Sexual Activity    Alcohol use: Not Currently    Drug  use: Not Currently    Sexual activity: Not on file   Other Topics Concern    Not on file   Social History Narrative    Not on file     Social Determinants of Health     Financial Resource Strain: Not on file   Food Insecurity: Not on file   Transportation Needs: No Transportation Needs (7/7/2023)    PRAPARE - Transportation     Lack of Transportation (Medical): No     Lack of Transportation (Non-Medical): No   Physical Activity: Not on file   Stress: Not on file   Social Connections: Not on file   Intimate Partner Violence: Not on file   Housing Stability: Not on file     Past Family History  Family History   Problem Relation Age of Onset    Parkinson's Disease Mother     Dementia Mother     Heart Disease Father     Prostate cancer Brother      Medication(s)  [unfilled]  Allergies  Patient has no known allergies.    Review of Systems    A comprehensive 10 system review was conducted and is negative except as noted above in the HPI or here.      Vital Signs  BP (!) 142/70 (BP Location: Left arm, Patient Position: Sitting, BP Cuff Size: Adult)   Pulse (!) 101   Resp 18   Ht 1.524 m (5')   Wt 65.2 kg (143 lb 12.8 oz)   SpO2 94%   BMI 28.08 kg/m²     Physical Exam  Vitals and nursing note reviewed.   Constitutional:       Appearance: Normal appearance.   HENT:      Head: Normocephalic and atraumatic.      Nose: Nose normal.      Mouth/Throat:      Mouth: Mucous membranes are moist.      Pharynx: Oropharynx is clear.   Eyes:      Pupils: Pupils are equal, round, and reactive to light.   Cardiovascular:      Rate and Rhythm: Normal rate and regular rhythm.      Heart sounds: No murmur heard.     No friction rub. No gallop.   Pulmonary:      Effort: Pulmonary effort is normal.      Breath sounds: Normal breath sounds.   Abdominal:      General: Bowel sounds are normal.      Palpations: Abdomen is soft.   Musculoskeletal:         General: Normal range of motion.      Cervical back: Normal range of motion and  "neck supple.   Skin:     General: Skin is warm and dry.   Neurological:      General: No focal deficit present.      Mental Status: She is alert and oriented to person, place, and time. Mental status is at baseline.   Psychiatric:         Mood and Affect: Mood normal.         Behavior: Behavior normal.         Thought Content: Thought content normal.         Judgment: Judgment normal.         Lab Results   Component Value Date/Time    TSHULTRASEN 1.690 07/02/2023 0730      No results found for: \"FREET4\"     Lab Results   Component Value Date/Time    HBA1C 12.6 (H) 07/02/2023 07:30 AM       Lab Results   Component Value Date/Time    CHOLSTRLTOT 139 07/03/2023 02:22 AM    LDL 90 07/03/2023 02:22 AM    HDL 32 (A) 07/03/2023 02:22 AM    TRIGLYCERIDE 87 07/03/2023 02:22 AM         Lab Results   Component Value Date/Time    SODIUM 139 07/15/2023 06:16 AM    POTASSIUM 4.3 07/15/2023 06:16 AM    CHLORIDE 103 07/15/2023 06:16 AM    CO2 23 07/15/2023 06:16 AM    GLUCOSE 121 (H) 07/15/2023 06:16 AM    BUN 12 07/15/2023 06:16 AM    CREATININE 0.48 (L) 07/15/2023 06:16 AM       Lab Results   Component Value Date/Time    ALKPHOSPHAT 67 07/06/2023 05:35 AM    ASTSGOT 22 07/06/2023 05:35 AM    ALTSGPT 19 07/06/2023 05:35 AM    TBILIRUBIN 0.5 07/06/2023 05:35 AM         Total patient time was estimated to be 45 minutes consisting of chart review, direct patient interaction, medication renewal, plan development and overall communication with the cardiovascular team.        Electronically signed by:   Silas Davis DO, MPH  [facility]    Portions of this note were completed using voice recognition software (Dragon Naturally speaking software) . Occasional transcription errors may have escaped proof reading. I have made every reasonable attempt to correct obvious errors, but I expect that there are errors of grammar and possibly content that I did not discover before finalizing the note.      "

## 2023-08-30 ENCOUNTER — DOCUMENTATION (OUTPATIENT)
Dept: VASCULAR LAB | Facility: MEDICAL CENTER | Age: 68
End: 2023-08-30
Payer: MEDICARE

## 2023-08-30 NOTE — PROGRESS NOTES
[facility]     Received pharmacotherapy referral for DM from Dr. Frank on 7/28/23     Called pt to schedule appt to establish care - no answer. LVM.     5th call.    Will await further contact at this time.       [facility] at [phone], fax [phone]    Cas Weir, DoryD

## 2023-11-10 ENCOUNTER — HOSPITAL ENCOUNTER (OUTPATIENT)
Facility: MEDICAL CENTER | Age: 68
End: 2023-11-10
Attending: STUDENT IN AN ORGANIZED HEALTH CARE EDUCATION/TRAINING PROGRAM
Payer: MEDICARE

## 2023-11-10 ENCOUNTER — OFFICE VISIT (OUTPATIENT)
Dept: MEDICAL GROUP | Facility: MEDICAL CENTER | Age: 68
End: 2023-11-10
Payer: MEDICARE

## 2023-11-10 VITALS
OXYGEN SATURATION: 96 % | DIASTOLIC BLOOD PRESSURE: 70 MMHG | HEART RATE: 97 BPM | TEMPERATURE: 97.6 F | WEIGHT: 136 LBS | HEIGHT: 60 IN | SYSTOLIC BLOOD PRESSURE: 146 MMHG | BODY MASS INDEX: 26.7 KG/M2

## 2023-11-10 DIAGNOSIS — E11.9 TYPE 2 DIABETES MELLITUS WITHOUT COMPLICATION, WITHOUT LONG-TERM CURRENT USE OF INSULIN (HCC): ICD-10-CM

## 2023-11-10 DIAGNOSIS — Z12.31 ENCOUNTER FOR SCREENING MAMMOGRAM FOR BREAST CANCER: ICD-10-CM

## 2023-11-10 DIAGNOSIS — I63.9 ISCHEMIC STROKE (HCC): ICD-10-CM

## 2023-11-10 DIAGNOSIS — I10 PRIMARY HYPERTENSION: ICD-10-CM

## 2023-11-10 DIAGNOSIS — Z72.0 TOBACCO ABUSE: ICD-10-CM

## 2023-11-10 LAB
CREAT UR-MCNC: 37.29 MG/DL
HBA1C MFR BLD: 6.9 % (ref ?–5.8)
MICROALBUMIN UR-MCNC: <1.2 MG/DL
MICROALBUMIN/CREAT UR: NORMAL MG/G (ref 0–30)
POCT INT CON NEG: NEGATIVE
POCT INT CON POS: POSITIVE

## 2023-11-10 PROCEDURE — 3077F SYST BP >= 140 MM HG: CPT | Performed by: STUDENT IN AN ORGANIZED HEALTH CARE EDUCATION/TRAINING PROGRAM

## 2023-11-10 PROCEDURE — 82570 ASSAY OF URINE CREATININE: CPT

## 2023-11-10 PROCEDURE — 99214 OFFICE O/P EST MOD 30 MIN: CPT | Performed by: STUDENT IN AN ORGANIZED HEALTH CARE EDUCATION/TRAINING PROGRAM

## 2023-11-10 PROCEDURE — 3078F DIAST BP <80 MM HG: CPT | Performed by: STUDENT IN AN ORGANIZED HEALTH CARE EDUCATION/TRAINING PROGRAM

## 2023-11-10 PROCEDURE — 82043 UR ALBUMIN QUANTITATIVE: CPT

## 2023-11-10 PROCEDURE — 83036 HEMOGLOBIN GLYCOSYLATED A1C: CPT | Performed by: STUDENT IN AN ORGANIZED HEALTH CARE EDUCATION/TRAINING PROGRAM

## 2023-11-10 RX ORDER — LISINOPRIL 20 MG/1
20 TABLET ORAL 2 TIMES DAILY
Qty: 180 TABLET | Refills: 3 | Status: SHIPPED | OUTPATIENT
Start: 2023-11-10

## 2023-11-10 ASSESSMENT — FIBROSIS 4 INDEX: FIB4 SCORE: 2.02

## 2023-11-10 NOTE — PROGRESS NOTES
Subjective:     CC:     HPI:   Nyasia presents today with    Possible history of ischemic stroke, hypertension, type 2 diabetes, tobacco use    Last seen cardiology 8/23/2023  -Prior work-up included echocardiogram and Zio patch      #Type 2 diabetes diagnosed with A1c of 12.6  Started on metformin 500 mg twice daily (cannot tolerate higher dose) started on glipizide 2.5 mg  - through dieting, lost 10 lbs   - she does not want any change in her medication   A1C 6.9    #Hypertension lisinopril   - lisinopril increased to 40mg daily     #Stroke on Plavix and simvastatin    # smoking       No problems updated.    Health Maintenance:     ROS:  ROS at baseline     Objective:     Exam:  BP (!) 146/70 (BP Location: Left arm)   Pulse 97   Temp 36.4 °C (97.6 °F) (Temporal)   Ht 1.524 m (5')   Wt 61.7 kg (136 lb)   SpO2 96%   BMI 26.56 kg/m²  Body mass index is 26.56 kg/m².    Physical Exam  Constitutional:       Appearance: Normal appearance.   Cardiovascular:      Rate and Rhythm: Normal rate and regular rhythm.   Pulmonary:      Effort: Pulmonary effort is normal.      Breath sounds: Normal breath sounds.   Neurological:      Mental Status: She is alert.         Labs:     Assessment & Plan:     68 y.o. female with the following -       1. Type 2 diabetes mellitus without complication, without long-term current use of insulin (HCC)  Chronic stable  Well controlled on metformin 500mg bid and glipizide 2.5mg ER daily  Denies a/e hypoglycemia  - POCT Hemoglobin A1C  - Microalbumin Creat Ratio Urine - Clinic Collect; Future    2. Ischemic stroke (HCC)  Chronic stable      3. Primary hypertension  Chronic, poorly controlle  Did not take medication today   - lisinopril (PRINIVIL) 20 MG Tab; Take 1 Tablet by mouth 2 times a day.  Dispense: 180 Tablet; Refill: 3    4. Encounter for screening mammogram for breast cancer    - MA-SCREENING MAMMO BILAT W/TOMOSYNTHESIS W/CAD; Future    5. Tobacco abuse   Not interested in  quitting at this time       Return in about 3 months (around 2/10/2024) for Lab review, Diabetes+A1c, Hypertension + BP LOG.    Please note that this dictation was created using voice recognition software. I have made every reasonable attempt to correct obvious errors, but I expect that there are errors of grammar and possibly content that I did not discover before finalizing the note.

## 2023-11-22 ENCOUNTER — APPOINTMENT (OUTPATIENT)
Dept: NEUROLOGY | Facility: MEDICAL CENTER | Age: 68
End: 2023-11-22
Attending: PSYCHIATRY & NEUROLOGY
Payer: MEDICARE

## 2023-12-04 ENCOUNTER — APPOINTMENT (OUTPATIENT)
Dept: CARDIOLOGY | Facility: MEDICAL CENTER | Age: 68
End: 2023-12-04
Attending: INTERNAL MEDICINE
Payer: MEDICARE

## 2024-07-11 ENCOUNTER — HOSPITAL ENCOUNTER (OUTPATIENT)
Dept: LAB | Facility: MEDICAL CENTER | Age: 69
End: 2024-07-11
Attending: STUDENT IN AN ORGANIZED HEALTH CARE EDUCATION/TRAINING PROGRAM
Payer: MEDICARE

## 2024-07-11 ENCOUNTER — OFFICE VISIT (OUTPATIENT)
Dept: MEDICAL GROUP | Facility: MEDICAL CENTER | Age: 69
End: 2024-07-11
Payer: MEDICARE

## 2024-07-11 VITALS
WEIGHT: 151.01 LBS | OXYGEN SATURATION: 93 % | DIASTOLIC BLOOD PRESSURE: 78 MMHG | HEIGHT: 60 IN | BODY MASS INDEX: 29.65 KG/M2 | HEART RATE: 91 BPM | TEMPERATURE: 97 F | SYSTOLIC BLOOD PRESSURE: 150 MMHG

## 2024-07-11 DIAGNOSIS — E11.69 TYPE 2 DIABETES MELLITUS WITH OTHER SPECIFIED COMPLICATION, WITHOUT LONG-TERM CURRENT USE OF INSULIN (HCC): ICD-10-CM

## 2024-07-11 DIAGNOSIS — Z11.59 ENCOUNTER FOR HEPATITIS C SCREENING TEST FOR LOW RISK PATIENT: ICD-10-CM

## 2024-07-11 DIAGNOSIS — Z86.73 HISTORY OF STROKE: ICD-10-CM

## 2024-07-11 DIAGNOSIS — Z72.0 TOBACCO ABUSE: ICD-10-CM

## 2024-07-11 DIAGNOSIS — I10 PRIMARY HYPERTENSION: ICD-10-CM

## 2024-07-11 LAB
ALBUMIN SERPL BCP-MCNC: 4.6 G/DL (ref 3.2–4.9)
ALBUMIN/GLOB SERPL: 1.4 G/DL
ALP SERPL-CCNC: 69 U/L (ref 30–99)
ALT SERPL-CCNC: 14 U/L (ref 2–50)
ANION GAP SERPL CALC-SCNC: 12 MMOL/L (ref 7–16)
AST SERPL-CCNC: 18 U/L (ref 12–45)
BILIRUB SERPL-MCNC: 0.3 MG/DL (ref 0.1–1.5)
BUN SERPL-MCNC: 9 MG/DL (ref 8–22)
CALCIUM ALBUM COR SERPL-MCNC: 9.8 MG/DL (ref 8.5–10.5)
CALCIUM SERPL-MCNC: 10.3 MG/DL (ref 8.5–10.5)
CHLORIDE SERPL-SCNC: 98 MMOL/L (ref 96–112)
CHOLEST SERPL-MCNC: 128 MG/DL (ref 100–199)
CO2 SERPL-SCNC: 25 MMOL/L (ref 20–33)
CREAT SERPL-MCNC: 0.55 MG/DL (ref 0.5–1.4)
CREAT UR-MCNC: 17.94 MG/DL
ERYTHROCYTE [DISTWIDTH] IN BLOOD BY AUTOMATED COUNT: 43.8 FL (ref 35.9–50)
GFR SERPLBLD CREATININE-BSD FMLA CKD-EPI: 99 ML/MIN/1.73 M 2
GLOBULIN SER CALC-MCNC: 3.3 G/DL (ref 1.9–3.5)
GLUCOSE SERPL-MCNC: 141 MG/DL (ref 65–99)
HBA1C MFR BLD: 6.9 % (ref ?–5.8)
HCT VFR BLD AUTO: 45.6 % (ref 37–47)
HCV AB SER QL: NORMAL
HDLC SERPL-MCNC: 43 MG/DL
HGB BLD-MCNC: 15.5 G/DL (ref 12–16)
LDLC SERPL CALC-MCNC: 65 MG/DL
MCH RBC QN AUTO: 31.1 PG (ref 27–33)
MCHC RBC AUTO-ENTMCNC: 34 G/DL (ref 32.2–35.5)
MCV RBC AUTO: 91.6 FL (ref 81.4–97.8)
MICROALBUMIN UR-MCNC: <1.2 MG/DL
MICROALBUMIN/CREAT UR: NORMAL MG/G (ref 0–30)
PLATELET # BLD AUTO: 235 K/UL (ref 164–446)
PMV BLD AUTO: 11.2 FL (ref 9–12.9)
POCT INT CON NEG: NEGATIVE
POCT INT CON POS: POSITIVE
POTASSIUM SERPL-SCNC: 5.1 MMOL/L (ref 3.6–5.5)
PROT SERPL-MCNC: 7.9 G/DL (ref 6–8.2)
RBC # BLD AUTO: 4.98 M/UL (ref 4.2–5.4)
SODIUM SERPL-SCNC: 135 MMOL/L (ref 135–145)
TRIGL SERPL-MCNC: 99 MG/DL (ref 0–149)
TSH SERPL DL<=0.005 MIU/L-ACNC: 0.79 UIU/ML (ref 0.38–5.33)
WBC # BLD AUTO: 8.8 K/UL (ref 4.8–10.8)

## 2024-07-11 PROCEDURE — 3077F SYST BP >= 140 MM HG: CPT | Performed by: STUDENT IN AN ORGANIZED HEALTH CARE EDUCATION/TRAINING PROGRAM

## 2024-07-11 PROCEDURE — 3078F DIAST BP <80 MM HG: CPT | Performed by: STUDENT IN AN ORGANIZED HEALTH CARE EDUCATION/TRAINING PROGRAM

## 2024-07-11 PROCEDURE — 80053 COMPREHEN METABOLIC PANEL: CPT

## 2024-07-11 PROCEDURE — 80061 LIPID PANEL: CPT

## 2024-07-11 PROCEDURE — 86803 HEPATITIS C AB TEST: CPT

## 2024-07-11 PROCEDURE — 36415 COLL VENOUS BLD VENIPUNCTURE: CPT

## 2024-07-11 PROCEDURE — 85027 COMPLETE CBC AUTOMATED: CPT

## 2024-07-11 PROCEDURE — 82043 UR ALBUMIN QUANTITATIVE: CPT

## 2024-07-11 PROCEDURE — 99214 OFFICE O/P EST MOD 30 MIN: CPT | Performed by: STUDENT IN AN ORGANIZED HEALTH CARE EDUCATION/TRAINING PROGRAM

## 2024-07-11 PROCEDURE — 84443 ASSAY THYROID STIM HORMONE: CPT

## 2024-07-11 PROCEDURE — 83036 HEMOGLOBIN GLYCOSYLATED A1C: CPT | Performed by: STUDENT IN AN ORGANIZED HEALTH CARE EDUCATION/TRAINING PROGRAM

## 2024-07-11 PROCEDURE — 82570 ASSAY OF URINE CREATININE: CPT

## 2024-07-11 RX ORDER — AMLODIPINE BESYLATE 5 MG/1
5 TABLET ORAL DAILY
Qty: 90 TABLET | Refills: 3 | Status: SHIPPED | OUTPATIENT
Start: 2024-07-11

## 2024-07-11 RX ORDER — METFORMIN HYDROCHLORIDE 500 MG/1
1000 TABLET, EXTENDED RELEASE ORAL
Qty: 180 TABLET | Refills: 3 | Status: SHIPPED | OUTPATIENT
Start: 2024-07-11

## 2024-07-11 RX ORDER — GLIPIZIDE 2.5 MG/1
2.5 TABLET, EXTENDED RELEASE ORAL DAILY
Qty: 90 TABLET | Refills: 3 | Status: SHIPPED | OUTPATIENT
Start: 2024-07-11

## 2024-07-11 RX ORDER — SIMVASTATIN 40 MG
40 TABLET ORAL NIGHTLY
Qty: 90 TABLET | Refills: 3 | Status: SHIPPED | OUTPATIENT
Start: 2024-07-11

## 2024-07-11 RX ORDER — HYDROCHLOROTHIAZIDE 12.5 MG/1
12.5 TABLET ORAL DAILY
Qty: 90 TABLET | Refills: 3 | Status: SHIPPED | OUTPATIENT
Start: 2024-07-11

## 2024-07-11 ASSESSMENT — ENCOUNTER SYMPTOMS
SHORTNESS OF BREATH: 0
CHILLS: 0
NAUSEA: 0
WEIGHT LOSS: 0
HEADACHES: 0
VOMITING: 0
DIZZINESS: 0
WHEEZING: 0
FEVER: 0
PALPITATIONS: 0

## 2024-07-11 ASSESSMENT — FIBROSIS 4 INDEX: FIB4 SCORE: 2.02

## 2024-07-11 ASSESSMENT — PATIENT HEALTH QUESTIONNAIRE - PHQ9: CLINICAL INTERPRETATION OF PHQ2 SCORE: 0

## 2024-08-04 DIAGNOSIS — I63.9 CEREBROVASCULAR ACCIDENT (CVA), UNSPECIFIED MECHANISM (HCC): ICD-10-CM

## 2024-08-05 RX ORDER — CLOPIDOGREL BISULFATE 75 MG/1
75 TABLET ORAL DAILY
Qty: 90 TABLET | Refills: 3 | Status: SHIPPED | OUTPATIENT
Start: 2024-08-05

## 2025-01-13 DIAGNOSIS — I10 PRIMARY HYPERTENSION: ICD-10-CM

## 2025-01-13 RX ORDER — LISINOPRIL 20 MG/1
20 TABLET ORAL 2 TIMES DAILY
Qty: 180 TABLET | Refills: 0 | Status: SHIPPED | OUTPATIENT
Start: 2025-01-13

## 2025-01-13 NOTE — TELEPHONE ENCOUNTER
Received request via: Pharmacy    Was the patient seen in the last year in this department? Yes    Does the patient have an active prescription (recently filled or refills available) for medication(s) requested? No    Pharmacy Name:   [id number]  [place], [place] - [address]  [facility] [address]  Phone: [phone] Fax: [phone]       Does the patient have alf Plus and need 100-day supply? (This applies to ALL medications) Patient does not have SCP

## 2025-03-05 NOTE — TELEPHONE ENCOUNTER
The patient (or guardian, if applicable) and other individuals in attendance with the patient were advised that Artificial Intelligence will be utilized during this visit to record, process the conversation to generate a clinical note, and support improvement of the AI technology. The patient (or guardian, if applicable) and other individuals in attendance at the appointment consented to the use of AI, including the recording.        Laura Lauren is a 50 y.o. female , id x 2(name and ). Reviewed record, history, and  medications.    Chief Complaint   Patient presents with    Medication Refill    Referral - General     Dermatology    Menopause     She has not had period in a while. Its been on and off but she is having hot flashes and night sweats.         Health Maintenance Due   Topic    HIV screen     Hepatitis B vaccine (1 of 3 - 19+ 3-dose series)    DTaP/Tdap/Td vaccine (1 - Tdap)    Cervical cancer screen     Shingles vaccine (1 of 2)    Pneumococcal 50+ years Vaccine (1 of 1 - PCV)    Breast cancer screen     Flu vaccine (1)    COVID-19 Vaccine ( season)    Annual Wellness Visit (Medicare Advantage)        Wt Readings from Last 1 Encounters:   25 78 kg (172 lb)     BP Readings from Last 3 Encounters:   25 119/84   24 116/77   24 112/80     Pulse Readings from Last 1 Encounters:   25 82         Patient is currently accompanied by self & I have received verbal consent from Laura Lauren to discuss any/all medical information while he/she is present in the room.    Home BP cuff present today:  no    Home medication list or bottles present today: no  - All medications were reviewed and updated with the patient today in office.    Forms for completion: no    Chest pain/SOB no    Surgery within the next month:  no      Depression Screening:  :     Depression: Not at risk (3/5/2025)    PHQ-2     PHQ-2 Score: 0          Coordination of Care Questionnaire:  :     \"Have 
Called patient in regards to records for NP appointment with Dr. JEFFRIES. To review most recent lab results, ekg, any cardiac testing or ,if she has been treated by a cardiologist. No answer, left voicemail to call back  
chart.    Review of Systems - negative except as listed above in the HPI    Objective:     Vitals:    03/05/25 1053   BP: 119/84   Site: Left Upper Arm   Position: Sitting   Cuff Size: Medium Adult   Pulse: 82   Resp: 18   SpO2: 93%   Weight: 78 kg (172 lb)   Height: 1.715 m (5' 7.5\")         Physical Exam  Vitals and nursing note reviewed.   Constitutional:       General: She is not in acute distress.     Appearance: Normal appearance. She is not ill-appearing or toxic-appearing.   HENT:      Head: Normocephalic and atraumatic.   Eyes:      General:         Right eye: No discharge.         Left eye: No discharge.      Conjunctiva/sclera: Conjunctivae normal.   Cardiovascular:      Rate and Rhythm: Normal rate and regular rhythm.      Pulses: Normal pulses.      Heart sounds: Normal heart sounds.   Pulmonary:      Effort: Pulmonary effort is normal. No respiratory distress.      Breath sounds: Normal breath sounds.   Musculoskeletal:         General: No tenderness.      Cervical back: No rigidity.      Right lower leg: No edema.      Left lower leg: No edema.   Skin:     General: Skin is warm and dry.      Findings: No rash (over visualized areas).   Neurological:      General: No focal deficit present.      Mental Status: She is alert.   Psychiatric:         Mood and Affect: Mood normal.         Behavior: Behavior normal.         Thought Content: Thought content normal.         Judgment: Judgment normal.              Apoorva Chávez MD    The patient (or guardian, if applicable) and other individuals in attendance with the patient were advised that Artificial Intelligence will be utilized during this visit to record and process the conversation to generate a clinical note. The patient (or guardian, if applicable) and other individuals in attendance at the appointment consented to the use of AI, including the recording.

## 2025-03-28 DIAGNOSIS — I10 PRIMARY HYPERTENSION: ICD-10-CM

## 2025-03-31 RX ORDER — LISINOPRIL 20 MG/1
20 TABLET ORAL 2 TIMES DAILY
Qty: 180 TABLET | Refills: 0 | Status: SHIPPED | OUTPATIENT
Start: 2025-03-31

## 2025-07-03 DIAGNOSIS — I10 PRIMARY HYPERTENSION: ICD-10-CM

## 2025-07-03 RX ORDER — HYDROCHLOROTHIAZIDE 12.5 MG/1
12.5 TABLET ORAL DAILY
Qty: 90 TABLET | Refills: 0 | Status: SHIPPED | OUTPATIENT
Start: 2025-07-03

## 2025-07-03 RX ORDER — AMLODIPINE BESYLATE 5 MG/1
5 TABLET ORAL DAILY
Qty: 90 TABLET | Refills: 0 | Status: SHIPPED | OUTPATIENT
Start: 2025-07-03

## 2025-08-13 ENCOUNTER — OFFICE VISIT (OUTPATIENT)
Dept: MEDICAL GROUP | Facility: MEDICAL CENTER | Age: 70
End: 2025-08-13
Payer: MEDICARE

## 2025-08-13 VITALS
DIASTOLIC BLOOD PRESSURE: 78 MMHG | HEIGHT: 60 IN | TEMPERATURE: 98.4 F | RESPIRATION RATE: 18 BRPM | WEIGHT: 158.51 LBS | OXYGEN SATURATION: 93 % | BODY MASS INDEX: 31.12 KG/M2 | SYSTOLIC BLOOD PRESSURE: 140 MMHG | HEART RATE: 92 BPM

## 2025-08-13 DIAGNOSIS — Z86.73 HISTORY OF STROKE: ICD-10-CM

## 2025-08-13 DIAGNOSIS — I63.9 CEREBROVASCULAR ACCIDENT (CVA), UNSPECIFIED MECHANISM (HCC): ICD-10-CM

## 2025-08-13 DIAGNOSIS — E11.69 TYPE 2 DIABETES MELLITUS WITH OTHER SPECIFIED COMPLICATION, WITHOUT LONG-TERM CURRENT USE OF INSULIN (HCC): Primary | ICD-10-CM

## 2025-08-13 DIAGNOSIS — I10 PRIMARY HYPERTENSION: ICD-10-CM

## 2025-08-13 LAB
HBA1C MFR BLD: 6.8 % (ref ?–5.8)
POCT INT CON NEG: NEGATIVE
POCT INT CON POS: POSITIVE

## 2025-08-13 PROCEDURE — 83036 HEMOGLOBIN GLYCOSYLATED A1C: CPT | Performed by: STUDENT IN AN ORGANIZED HEALTH CARE EDUCATION/TRAINING PROGRAM

## 2025-08-13 PROCEDURE — 3077F SYST BP >= 140 MM HG: CPT | Performed by: STUDENT IN AN ORGANIZED HEALTH CARE EDUCATION/TRAINING PROGRAM

## 2025-08-13 PROCEDURE — 3078F DIAST BP <80 MM HG: CPT | Performed by: STUDENT IN AN ORGANIZED HEALTH CARE EDUCATION/TRAINING PROGRAM

## 2025-08-13 PROCEDURE — 99214 OFFICE O/P EST MOD 30 MIN: CPT | Performed by: STUDENT IN AN ORGANIZED HEALTH CARE EDUCATION/TRAINING PROGRAM

## 2025-08-13 RX ORDER — LISINOPRIL 20 MG/1
20 TABLET ORAL 2 TIMES DAILY
Qty: 180 TABLET | Refills: 3 | Status: SHIPPED | OUTPATIENT
Start: 2025-08-13

## 2025-08-13 RX ORDER — CLOPIDOGREL BISULFATE 75 MG/1
75 TABLET ORAL DAILY
Qty: 90 TABLET | Refills: 3 | Status: SHIPPED | OUTPATIENT
Start: 2025-08-13

## 2025-08-13 RX ORDER — LISINOPRIL 20 MG/1
20 TABLET ORAL 2 TIMES DAILY
Qty: 180 TABLET | Refills: 0 | Status: SHIPPED | OUTPATIENT
Start: 2025-08-13 | End: 2025-08-13

## 2025-08-13 RX ORDER — SIMVASTATIN 40 MG
40 TABLET ORAL NIGHTLY
Qty: 90 TABLET | Refills: 3 | Status: SHIPPED | OUTPATIENT
Start: 2025-08-13

## 2025-08-13 RX ORDER — AMLODIPINE BESYLATE 5 MG/1
5 TABLET ORAL DAILY
Qty: 90 TABLET | Refills: 3 | Status: SHIPPED | OUTPATIENT
Start: 2025-08-13

## 2025-08-13 RX ORDER — GLIPIZIDE 2.5 MG/1
2.5 TABLET, EXTENDED RELEASE ORAL DAILY
Qty: 90 TABLET | Refills: 3 | Status: SHIPPED | OUTPATIENT
Start: 2025-08-13

## 2025-08-13 RX ORDER — HYDROCHLOROTHIAZIDE 12.5 MG/1
12.5 TABLET ORAL DAILY
Qty: 90 TABLET | Refills: 3 | Status: SHIPPED | OUTPATIENT
Start: 2025-08-13

## 2025-08-13 RX ORDER — HYDROCHLOROTHIAZIDE 12.5 MG/1
12.5 TABLET ORAL DAILY
Qty: 90 TABLET | Refills: 0 | Status: SHIPPED | OUTPATIENT
Start: 2025-08-13 | End: 2025-08-13

## 2025-08-13 RX ORDER — METFORMIN HYDROCHLORIDE 500 MG/1
1000 TABLET, EXTENDED RELEASE ORAL
Qty: 180 TABLET | Refills: 3 | Status: SHIPPED | OUTPATIENT
Start: 2025-08-13

## 2025-08-13 RX ORDER — AMLODIPINE BESYLATE 5 MG/1
5 TABLET ORAL DAILY
Qty: 90 TABLET | Refills: 0 | Status: SHIPPED | OUTPATIENT
Start: 2025-08-13 | End: 2025-08-13

## 2025-08-13 ASSESSMENT — ENCOUNTER SYMPTOMS
FEVER: 0
WHEEZING: 0
PALPITATIONS: 0
WEIGHT LOSS: 0
CHILLS: 0
DIZZINESS: 0
NAUSEA: 0
SHORTNESS OF BREATH: 0
VOMITING: 0
HEADACHES: 0

## 2025-08-13 ASSESSMENT — PATIENT HEALTH QUESTIONNAIRE - PHQ9: CLINICAL INTERPRETATION OF PHQ2 SCORE: 0

## 2025-08-13 ASSESSMENT — FIBROSIS 4 INDEX: FIB4 SCORE: 1.43
